# Patient Record
Sex: FEMALE | Race: WHITE | NOT HISPANIC OR LATINO
[De-identification: names, ages, dates, MRNs, and addresses within clinical notes are randomized per-mention and may not be internally consistent; named-entity substitution may affect disease eponyms.]

---

## 2017-07-26 ENCOUNTER — APPOINTMENT (OUTPATIENT)
Dept: CT IMAGING | Facility: HOSPITAL | Age: 62
End: 2017-07-26

## 2017-07-26 ENCOUNTER — OUTPATIENT (OUTPATIENT)
Dept: OUTPATIENT SERVICES | Facility: HOSPITAL | Age: 62
LOS: 1 days | End: 2017-07-26
Payer: COMMERCIAL

## 2017-07-26 PROCEDURE — 74177 CT ABD & PELVIS W/CONTRAST: CPT

## 2017-08-03 ENCOUNTER — APPOINTMENT (OUTPATIENT)
Dept: MRI IMAGING | Facility: HOSPITAL | Age: 62
End: 2017-08-03

## 2017-08-03 ENCOUNTER — OUTPATIENT (OUTPATIENT)
Dept: OUTPATIENT SERVICES | Facility: HOSPITAL | Age: 62
LOS: 1 days | End: 2017-08-03
Payer: COMMERCIAL

## 2017-08-03 ENCOUNTER — APPOINTMENT (OUTPATIENT)
Dept: MRI IMAGING | Facility: HOSPITAL | Age: 62
End: 2017-08-03
Payer: COMMERCIAL

## 2017-08-03 PROCEDURE — 76376 3D RENDER W/INTRP POSTPROCES: CPT

## 2017-08-03 PROCEDURE — 74183 MRI ABD W/O CNTR FLWD CNTR: CPT

## 2017-08-03 PROCEDURE — A9579: CPT

## 2017-08-03 PROCEDURE — 74183 MRI ABD W/O CNTR FLWD CNTR: CPT | Mod: 26

## 2017-08-03 PROCEDURE — 76376 3D RENDER W/INTRP POSTPROCES: CPT | Mod: 26

## 2017-08-16 ENCOUNTER — LABORATORY RESULT (OUTPATIENT)
Age: 62
End: 2017-08-16

## 2017-08-16 ENCOUNTER — APPOINTMENT (OUTPATIENT)
Age: 62
End: 2017-08-16
Payer: COMMERCIAL

## 2017-08-16 VITALS
DIASTOLIC BLOOD PRESSURE: 75 MMHG | WEIGHT: 168 LBS | SYSTOLIC BLOOD PRESSURE: 112 MMHG | OXYGEN SATURATION: 100 % | HEIGHT: 69 IN | HEART RATE: 67 BPM | BODY MASS INDEX: 24.88 KG/M2

## 2017-08-16 DIAGNOSIS — Z87.891 PERSONAL HISTORY OF NICOTINE DEPENDENCE: ICD-10-CM

## 2017-08-16 DIAGNOSIS — Z78.9 OTHER SPECIFIED HEALTH STATUS: ICD-10-CM

## 2017-08-16 PROCEDURE — 99204 OFFICE O/P NEW MOD 45 MIN: CPT

## 2017-08-17 LAB
ALBUMIN SERPL ELPH-MCNC: 2.7 G/DL
ALP BLD-CCNC: 754 U/L
ALT SERPL-CCNC: 436 U/L
ANION GAP SERPL CALC-SCNC: 14 MMOL/L
AST SERPL-CCNC: 1101 U/L
B BURGDOR IGG+IGM SER QL IB: NORMAL
BASOPHILS # BLD AUTO: 0.05 K/UL
BASOPHILS NFR BLD AUTO: 0.9 %
BILIRUB DIRECT SERPL-MCNC: 6.5 MG/DL
BILIRUB SERPL-MCNC: 8.9 MG/DL
BUN SERPL-MCNC: 14 MG/DL
CALCIUM SERPL-MCNC: 8.5 MG/DL
CHLORIDE SERPL-SCNC: 102 MMOL/L
CO2 SERPL-SCNC: 20 MMOL/L
CREAT SERPL-MCNC: 0.58 MG/DL
EOSINOPHIL # BLD AUTO: 0.09 K/UL
EOSINOPHIL NFR BLD AUTO: 1.7 %
FERRITIN SERPL-MCNC: 57 NG/ML
GGT SERPL-CCNC: 466 U/L
GLUCOSE SERPL-MCNC: 155 MG/DL
HCT VFR BLD CALC: 32.4 %
HGB BLD-MCNC: 10.1 G/DL
INR PPP: 0.98 RATIO
IRON SATN MFR SERPL: 11 %
IRON SERPL-MCNC: 43 UG/DL
LYMPHOCYTES # BLD AUTO: 1.44 K/UL
LYMPHOCYTES NFR BLD AUTO: 27 %
MAN DIFF?: NORMAL
MCHC RBC-ENTMCNC: 25.2 PG
MCHC RBC-ENTMCNC: 31.2 GM/DL
MCV RBC AUTO: 80.8 FL
MONOCYTES # BLD AUTO: 0.42 K/UL
MONOCYTES NFR BLD AUTO: 7.8 %
NEUTROPHILS # BLD AUTO: 3.29 K/UL
NEUTROPHILS NFR BLD AUTO: 60.8 %
PLATELET # BLD AUTO: 313 K/UL
POTASSIUM SERPL-SCNC: 4.1 MMOL/L
PROT SERPL-MCNC: 7.1 G/DL
PT BLD: 11.1 SEC
RBC # BLD: 3.93 M/UL
RBC # BLD: 4.01 M/UL
RBC # FLD: 23.4 %
RETICS # AUTO: 2.1 %
RETICS AGGREG/RBC NFR: 82.1 K/UL
SODIUM SERPL-SCNC: 136 MMOL/L
TIBC SERPL-MCNC: 395 UG/DL
UIBC SERPL-MCNC: 352 UG/DL
WBC # FLD AUTO: 5.34 K/UL

## 2017-08-18 LAB
ANA PAT FLD IF-IMP: ABNORMAL
ANA SER IF-ACNC: ABNORMAL
B DIV+MO-1 18S RRNA BLD QL NAA+NON-PROBE: NEGATIVE
B DUNCANI 18S RRNA BLD QL NAA+NON-PROBE: NEGATIVE
B MICROTI 18S RRNA BLD QL NAA+NON-PROBE: NEGATIVE
DEPRECATED KAPPA LC FREE/LAMBDA SER: 1.47 RATIO
IGA SER QL IEP: 228 MG/DL
IGG SER QL IEP: 1800 MG/DL
IGM SER QL IEP: 256 MG/DL
KAPPA LC CSF-MCNC: 2.63 MG/DL
KAPPA LC SERPL-MCNC: 3.86 MG/DL

## 2017-08-21 ENCOUNTER — LABORATORY RESULT (OUTPATIENT)
Age: 62
End: 2017-08-21

## 2017-08-21 LAB — SMOOTH MUSCLE AB SER QL IF: ABNORMAL

## 2017-08-22 LAB
ALBUMIN SERPL ELPH-MCNC: 2.8 G/DL
ALP BLD-CCNC: 757 U/L
ALT SERPL-CCNC: 431 U/L
ANION GAP SERPL CALC-SCNC: 13 MMOL/L
AST SERPL-CCNC: 1098 U/L
BASOPHILS # BLD AUTO: 0.02 K/UL
BASOPHILS NFR BLD AUTO: 0.4 %
BILIRUB SERPL-MCNC: 6.7 MG/DL
BUN SERPL-MCNC: 20 MG/DL
CALCIUM SERPL-MCNC: 8.9 MG/DL
CHLORIDE SERPL-SCNC: 107 MMOL/L
CO2 SERPL-SCNC: 20 MMOL/L
CREAT SERPL-MCNC: 0.72 MG/DL
EOSINOPHIL # BLD AUTO: 0.17 K/UL
EOSINOPHIL NFR BLD AUTO: 3.5 %
GLUCOSE SERPL-MCNC: 77 MG/DL
HCT VFR BLD CALC: 30.8 %
HGB BLD-MCNC: 9.6 G/DL
IMM GRANULOCYTES NFR BLD AUTO: 0.2 %
INR PPP: 0.97 RATIO
LYMPHOCYTES # BLD AUTO: 1.49 K/UL
LYMPHOCYTES NFR BLD AUTO: 30.8 %
MAN DIFF?: NORMAL
MCHC RBC-ENTMCNC: 25.9 PG
MCHC RBC-ENTMCNC: 31.2 GM/DL
MCV RBC AUTO: 83 FL
MONOCYTES # BLD AUTO: 0.81 K/UL
MONOCYTES NFR BLD AUTO: 16.8 %
NEUTROPHILS # BLD AUTO: 2.33 K/UL
NEUTROPHILS NFR BLD AUTO: 48.3 %
PLATELET # BLD AUTO: 274 K/UL
POTASSIUM SERPL-SCNC: 4.9 MMOL/L
PROT SERPL-MCNC: 6.6 G/DL
PT BLD: 10.9 SEC
RBC # BLD: 3.71 M/UL
RBC # FLD: 20.7 %
SODIUM SERPL-SCNC: 140 MMOL/L
WBC # FLD AUTO: 4.83 K/UL

## 2017-08-23 LAB
DEPRECATED KAPPA LC FREE/LAMBDA SER: 1.77 RATIO
IGA SER QL IEP: 235 MG/DL
IGG SER QL IEP: 1820 MG/DL
IGM SER QL IEP: 240 MG/DL
KAPPA LC CSF-MCNC: 2.36 MG/DL
KAPPA LC SERPL-MCNC: 4.18 MG/DL
MITOCHONDRIA AB SER IF-ACNC: NORMAL

## 2017-08-24 LAB
ANA PAT FLD IF-IMP: ABNORMAL
ANA SER IF-ACNC: ABNORMAL
BABESIA ANTIBODIES, IGG: NORMAL TITER
BABESIA ANTIBODIES, IGM: NORMAL TITER
SMOOTH MUSCLE AB SER QL IF: ABNORMAL

## 2017-08-28 ENCOUNTER — OUTPATIENT (OUTPATIENT)
Dept: OUTPATIENT SERVICES | Facility: HOSPITAL | Age: 62
LOS: 1 days | End: 2017-08-28
Payer: COMMERCIAL

## 2017-08-28 ENCOUNTER — APPOINTMENT (OUTPATIENT)
Dept: ULTRASOUND IMAGING | Facility: IMAGING CENTER | Age: 62
End: 2017-08-28
Payer: COMMERCIAL

## 2017-08-28 ENCOUNTER — RESULT REVIEW (OUTPATIENT)
Age: 62
End: 2017-08-28

## 2017-08-28 DIAGNOSIS — B17.9 ACUTE VIRAL HEPATITIS, UNSPECIFIED: ICD-10-CM

## 2017-08-28 PROCEDURE — 47000 NEEDLE BIOPSY OF LIVER PERQ: CPT

## 2017-08-28 PROCEDURE — 76942 ECHO GUIDE FOR BIOPSY: CPT

## 2017-08-28 PROCEDURE — 88307 TISSUE EXAM BY PATHOLOGIST: CPT | Mod: 26

## 2017-08-28 PROCEDURE — 76942 ECHO GUIDE FOR BIOPSY: CPT | Mod: 26

## 2017-08-28 PROCEDURE — 88313 SPECIAL STAINS GROUP 2: CPT | Mod: 26

## 2017-08-28 PROCEDURE — 88307 TISSUE EXAM BY PATHOLOGIST: CPT

## 2017-08-28 PROCEDURE — 88313 SPECIAL STAINS GROUP 2: CPT

## 2017-08-29 LAB
INR PPP: 0.99 RATIO
PT BLD: 11.2 SEC

## 2017-08-30 LAB
ALBUMIN SERPL ELPH-MCNC: 2.9 G/DL
ALP BLD-CCNC: 544 U/L
ALT SERPL-CCNC: 345 U/L
ANION GAP SERPL CALC-SCNC: 15 MMOL/L
AST SERPL-CCNC: 761 U/L
BASOPHILS # BLD AUTO: 0.04 K/UL
BASOPHILS NFR BLD AUTO: 0.6 %
BILIRUB SERPL-MCNC: 4.4 MG/DL
BUN SERPL-MCNC: 20 MG/DL
CALCIUM SERPL-MCNC: 9.3 MG/DL
CHLORIDE SERPL-SCNC: 106 MMOL/L
CO2 SERPL-SCNC: 21 MMOL/L
CREAT SERPL-MCNC: 0.56 MG/DL
EOSINOPHIL # BLD AUTO: 0.28 K/UL
EOSINOPHIL NFR BLD AUTO: 4.5 %
GLUCOSE SERPL-MCNC: 72 MG/DL
HCT VFR BLD CALC: 33.4 %
HGB BLD-MCNC: 10.1 G/DL
IMM GRANULOCYTES NFR BLD AUTO: 0.2 %
LYMPHOCYTES # BLD AUTO: 1.33 K/UL
LYMPHOCYTES NFR BLD AUTO: 21.2 %
MAN DIFF?: NORMAL
MCHC RBC-ENTMCNC: 25.5 PG
MCHC RBC-ENTMCNC: 30.2 GM/DL
MCV RBC AUTO: 84.3 FL
MONOCYTES # BLD AUTO: 0.71 K/UL
MONOCYTES NFR BLD AUTO: 11.3 %
NEUTROPHILS # BLD AUTO: 3.89 K/UL
NEUTROPHILS NFR BLD AUTO: 62.2 %
PLATELET # BLD AUTO: 287 K/UL
POTASSIUM SERPL-SCNC: 4.2 MMOL/L
PROT SERPL-MCNC: 7.1 G/DL
RBC # BLD: 3.96 M/UL
RBC # FLD: 19.2 %
SODIUM SERPL-SCNC: 142 MMOL/L
SURGICAL PATHOLOGY STUDY: SIGNIFICANT CHANGE UP
WBC # FLD AUTO: 6.26 K/UL

## 2017-08-31 ENCOUNTER — APPOINTMENT (OUTPATIENT)
Age: 62
End: 2017-08-31
Payer: COMMERCIAL

## 2017-08-31 VITALS
BODY MASS INDEX: 25.62 KG/M2 | HEART RATE: 77 BPM | TEMPERATURE: 97.8 F | RESPIRATION RATE: 14 BRPM | HEIGHT: 69 IN | WEIGHT: 173 LBS | DIASTOLIC BLOOD PRESSURE: 73 MMHG | SYSTOLIC BLOOD PRESSURE: 111 MMHG

## 2017-08-31 LAB
A PHAGOCYTOPH IGG TITR SER IF: NORMAL
ANAPLASMA PHAGOCYTOPHILIA IGG ANTIBODIES: NORMAL
ANAPLASMA PHAGOCYTOPHILIA IGM ANTIBODIES: NORMAL
EHRLICHIA AB SER QL IA: NORMAL
EHRLICIA CHAFFEENIS IGG ANTIBODIES: NORMAL
EHRLICIA CHAFFEENIS IGM ANTIBODIES: NORMAL

## 2017-08-31 PROCEDURE — 99214 OFFICE O/P EST MOD 30 MIN: CPT

## 2017-09-01 LAB
25(OH)D3 SERPL-MCNC: 21.5 NG/ML
ALBUMIN SERPL ELPH-MCNC: 3.2 G/DL
ALP BLD-CCNC: 533 U/L
ALT SERPL-CCNC: 362 U/L
ANION GAP SERPL CALC-SCNC: 16 MMOL/L
AST SERPL-CCNC: 749 U/L
BASOPHILS # BLD AUTO: 0.03 K/UL
BASOPHILS NFR BLD AUTO: 0.6 %
BILIRUB SERPL-MCNC: 4.7 MG/DL
BUN SERPL-MCNC: 18 MG/DL
CALCIUM SERPL-MCNC: 9.9 MG/DL
CHLORIDE SERPL-SCNC: 102 MMOL/L
CHOLEST SERPL-MCNC: 302 MG/DL
CHOLEST/HDLC SERPL: 9.7 RATIO
CO2 SERPL-SCNC: 20 MMOL/L
CREAT SERPL-MCNC: 0.65 MG/DL
DEPRECATED KAPPA LC FREE/LAMBDA SER: 1.66 RATIO
EOSINOPHIL # BLD AUTO: 0.26 K/UL
EOSINOPHIL NFR BLD AUTO: 5.3 %
GLUCOSE SERPL-MCNC: 89 MG/DL
HAV IGG+IGM SER QL: NONREACTIVE
HBA1C MFR BLD HPLC: 4.4 %
HBV CORE IGG+IGM SER QL: NONREACTIVE
HBV SURFACE AB SER QL: NONREACTIVE
HBV SURFACE AG SER QL: NONREACTIVE
HCT VFR BLD CALC: 36.7 %
HDLC SERPL-MCNC: 31 MG/DL
HGB BLD-MCNC: 10.9 G/DL
IGA SER QL IEP: 269 MG/DL
IGG SER QL IEP: 2130 MG/DL
IGM SER QL IEP: 244 MG/DL
IMM GRANULOCYTES NFR BLD AUTO: 0 %
KAPPA LC CSF-MCNC: 2.3 MG/DL
KAPPA LC SERPL-MCNC: 3.81 MG/DL
LDLC SERPL CALC-MCNC: 255 MG/DL
LYMPHOCYTES # BLD AUTO: 1.39 K/UL
LYMPHOCYTES NFR BLD AUTO: 28.1 %
MAN DIFF?: NORMAL
MCHC RBC-ENTMCNC: 25.5 PG
MCHC RBC-ENTMCNC: 29.7 GM/DL
MCV RBC AUTO: 85.9 FL
MONOCYTES # BLD AUTO: 0.66 K/UL
MONOCYTES NFR BLD AUTO: 13.4 %
NEUTROPHILS # BLD AUTO: 2.6 K/UL
NEUTROPHILS NFR BLD AUTO: 52.6 %
PLATELET # BLD AUTO: 308 K/UL
POTASSIUM SERPL-SCNC: 4.2 MMOL/L
PROT SERPL-MCNC: 7.7 G/DL
RBC # BLD: 4.27 M/UL
RBC # FLD: 19 %
SODIUM SERPL-SCNC: 138 MMOL/L
TRIGL SERPL-MCNC: 82 MG/DL
WBC # FLD AUTO: 4.94 K/UL

## 2017-09-02 ENCOUNTER — APPOINTMENT (OUTPATIENT)
Dept: RADIOLOGY | Facility: HOSPITAL | Age: 62
End: 2017-09-02
Payer: COMMERCIAL

## 2017-09-02 ENCOUNTER — OUTPATIENT (OUTPATIENT)
Dept: OUTPATIENT SERVICES | Facility: HOSPITAL | Age: 62
LOS: 1 days | End: 2017-09-02
Payer: COMMERCIAL

## 2017-09-02 PROCEDURE — 77080 DXA BONE DENSITY AXIAL: CPT

## 2017-09-02 PROCEDURE — 77080 DXA BONE DENSITY AXIAL: CPT | Mod: 26

## 2017-09-07 LAB
ALBUMIN SERPL ELPH-MCNC: 3 G/DL
ALP BLD-CCNC: 360 U/L
ALT SERPL-CCNC: 128 U/L
AMYLASE/CREAT SERPL: 33 U/L
ANION GAP SERPL CALC-SCNC: 16 MMOL/L
AST SERPL-CCNC: 146 U/L
BILIRUB SERPL-MCNC: 3.4 MG/DL
BUN SERPL-MCNC: 13 MG/DL
CALCIUM SERPL-MCNC: 9.5 MG/DL
CHLORIDE SERPL-SCNC: 104 MMOL/L
CO2 SERPL-SCNC: 21 MMOL/L
CREAT SERPL-MCNC: 0.68 MG/DL
GLUCOSE SERPL-MCNC: 133 MG/DL
LPL SERPL-CCNC: 25 U/L
POTASSIUM SERPL-SCNC: 4.5 MMOL/L
PROT SERPL-MCNC: 6.6 G/DL
SODIUM SERPL-SCNC: 141 MMOL/L

## 2017-09-08 LAB
DEPRECATED KAPPA LC FREE/LAMBDA SER: 1.07 RATIO
IGA SER QL IEP: 225 MG/DL
IGG SER QL IEP: 1820 MG/DL
IGM SER QL IEP: 240 MG/DL
KAPPA LC CSF-MCNC: 1.83 MG/DL
KAPPA LC SERPL-MCNC: 1.96 MG/DL

## 2017-09-14 ENCOUNTER — LABORATORY RESULT (OUTPATIENT)
Age: 62
End: 2017-09-14

## 2017-09-29 ENCOUNTER — LABORATORY RESULT (OUTPATIENT)
Age: 62
End: 2017-09-29

## 2017-10-01 LAB
ALBUMIN SERPL ELPH-MCNC: 3.1 G/DL
ALP BLD-CCNC: 158 U/L
ALT SERPL-CCNC: 29 U/L
ANION GAP SERPL CALC-SCNC: 14 MMOL/L
AST SERPL-CCNC: 49 U/L
BASOPHILS # BLD AUTO: 0 K/UL
BASOPHILS NFR BLD AUTO: 0 %
BILIRUB SERPL-MCNC: 1.3 MG/DL
BUN SERPL-MCNC: 17 MG/DL
CALCIUM SERPL-MCNC: 8.5 MG/DL
CHLORIDE SERPL-SCNC: 104 MMOL/L
CO2 SERPL-SCNC: 24 MMOL/L
CREAT SERPL-MCNC: 0.65 MG/DL
DEPRECATED KAPPA LC FREE/LAMBDA SER: 1.34 RATIO
EOSINOPHIL # BLD AUTO: 0 K/UL
EOSINOPHIL NFR BLD AUTO: 0 %
GLUCOSE SERPL-MCNC: 104 MG/DL
HCT VFR BLD CALC: 29.4 %
HGB BLD-MCNC: 9.3 G/DL
IGA SER QL IEP: 188 MG/DL
IGG SER QL IEP: 1300 MG/DL
IGM SER QL IEP: 190 MG/DL
INR PPP: 0.96 RATIO
KAPPA LC CSF-MCNC: 1.91 MG/DL
KAPPA LC SERPL-MCNC: 2.55 MG/DL
LYMPHOCYTES # BLD AUTO: 0.24 K/UL
LYMPHOCYTES NFR BLD AUTO: 4.5 %
MAN DIFF?: NORMAL
MCHC RBC-ENTMCNC: 26.3 PG
MCHC RBC-ENTMCNC: 31.6 GM/DL
MCV RBC AUTO: 83.1 FL
MONOCYTES # BLD AUTO: 0.24 K/UL
MONOCYTES NFR BLD AUTO: 4.5 %
NEUTROPHILS # BLD AUTO: 4.76 K/UL
NEUTROPHILS NFR BLD AUTO: 91 %
PLATELET # BLD AUTO: 242 K/UL
POTASSIUM SERPL-SCNC: 4.5 MMOL/L
PROT SERPL-MCNC: 6.4 G/DL
PT BLD: 10.8 SEC
RBC # BLD: 3.54 M/UL
RBC # FLD: 15.8 %
SODIUM SERPL-SCNC: 142 MMOL/L
WBC # FLD AUTO: 5.23 K/UL

## 2017-10-03 LAB
ANA PAT FLD IF-IMP: ABNORMAL
ANA SER IF-ACNC: ABNORMAL

## 2017-10-18 ENCOUNTER — APPOINTMENT (OUTPATIENT)
Age: 62
End: 2017-10-18
Payer: COMMERCIAL

## 2017-10-18 VITALS
SYSTOLIC BLOOD PRESSURE: 110 MMHG | HEIGHT: 69 IN | DIASTOLIC BLOOD PRESSURE: 70 MMHG | HEART RATE: 86 BPM | OXYGEN SATURATION: 99 %

## 2017-10-18 DIAGNOSIS — B17.9 ACUTE VIRAL HEPATITIS, UNSPECIFIED: ICD-10-CM

## 2017-10-18 PROCEDURE — 99214 OFFICE O/P EST MOD 30 MIN: CPT

## 2017-10-18 RX ORDER — PREDNISONE 10 MG/1
10 TABLET ORAL DAILY
Qty: 28 | Refills: 6 | Status: DISCONTINUED | COMMUNITY
Start: 2017-08-31 | End: 2017-10-18

## 2017-12-14 ENCOUNTER — LABORATORY RESULT (OUTPATIENT)
Age: 62
End: 2017-12-14

## 2018-01-10 ENCOUNTER — APPOINTMENT (OUTPATIENT)
Age: 63
End: 2018-01-10
Payer: COMMERCIAL

## 2018-01-10 VITALS
HEART RATE: 66 BPM | TEMPERATURE: 98 F | SYSTOLIC BLOOD PRESSURE: 90 MMHG | HEIGHT: 69 IN | DIASTOLIC BLOOD PRESSURE: 70 MMHG | BODY MASS INDEX: 25.92 KG/M2 | WEIGHT: 175 LBS | OXYGEN SATURATION: 99 %

## 2018-01-10 PROCEDURE — 99214 OFFICE O/P EST MOD 30 MIN: CPT

## 2018-03-08 ENCOUNTER — RX RENEWAL (OUTPATIENT)
Age: 63
End: 2018-03-08

## 2018-03-30 LAB
ALBUMIN SERPL ELPH-MCNC: 3.9 G/DL
ALP BLD-CCNC: 65 U/L
ALT SERPL-CCNC: 16 U/L
ANION GAP SERPL CALC-SCNC: 15 MMOL/L
AST SERPL-CCNC: 28 U/L
BASOPHILS # BLD AUTO: 0.01 K/UL
BASOPHILS NFR BLD AUTO: 0.2 %
BILIRUB SERPL-MCNC: 0.4 MG/DL
BUN SERPL-MCNC: 16 MG/DL
CALCIUM SERPL-MCNC: 9.9 MG/DL
CHLORIDE SERPL-SCNC: 103 MMOL/L
CO2 SERPL-SCNC: 23 MMOL/L
CREAT SERPL-MCNC: 0.7 MG/DL
EOSINOPHIL # BLD AUTO: 0.02 K/UL
EOSINOPHIL NFR BLD AUTO: 0.4 %
GGT SERPL-CCNC: 17 U/L
GLUCOSE SERPL-MCNC: 95 MG/DL
HCT VFR BLD CALC: 37.5 %
HGB BLD-MCNC: 12.1 G/DL
IMM GRANULOCYTES NFR BLD AUTO: 0 %
INR PPP: 0.91 RATIO
LYMPHOCYTES # BLD AUTO: 0.68 K/UL
LYMPHOCYTES NFR BLD AUTO: 14.6 %
MAN DIFF?: NORMAL
MCHC RBC-ENTMCNC: 27.4 PG
MCHC RBC-ENTMCNC: 32.3 GM/DL
MCV RBC AUTO: 84.8 FL
MONOCYTES # BLD AUTO: 0.3 K/UL
MONOCYTES NFR BLD AUTO: 6.4 %
NEUTROPHILS # BLD AUTO: 3.66 K/UL
NEUTROPHILS NFR BLD AUTO: 78.4 %
PLATELET # BLD AUTO: 200 K/UL
POTASSIUM SERPL-SCNC: 4.5 MMOL/L
PROT SERPL-MCNC: 6.8 G/DL
PT BLD: 10.3 SEC
RBC # BLD: 4.42 M/UL
RBC # FLD: 17 %
SODIUM SERPL-SCNC: 141 MMOL/L
WBC # FLD AUTO: 4.67 K/UL

## 2018-04-02 LAB
ANA PAT FLD IF-IMP: ABNORMAL
ANA SER IF-ACNC: ABNORMAL
DEPRECATED KAPPA LC FREE/LAMBDA SER: 1.06 RATIO
IGA SER QL IEP: 118 MG/DL
IGG SER QL IEP: 863 MG/DL
IGM SER QL IEP: 146 MG/DL
KAPPA LC CSF-MCNC: 1.13 MG/DL
KAPPA LC SERPL-MCNC: 1.2 MG/DL
MITOCHONDRIA AB SER IF-ACNC: NORMAL
SMOOTH MUSCLE AB SER QL IF: ABNORMAL

## 2018-04-27 ENCOUNTER — APPOINTMENT (OUTPATIENT)
Age: 63
End: 2018-04-27
Payer: COMMERCIAL

## 2018-04-27 VITALS
HEART RATE: 71 BPM | DIASTOLIC BLOOD PRESSURE: 73 MMHG | WEIGHT: 172 LBS | TEMPERATURE: 97.9 F | HEIGHT: 69 IN | BODY MASS INDEX: 25.48 KG/M2 | RESPIRATION RATE: 14 BRPM | SYSTOLIC BLOOD PRESSURE: 110 MMHG | OXYGEN SATURATION: 96 %

## 2018-04-27 PROCEDURE — 99214 OFFICE O/P EST MOD 30 MIN: CPT

## 2018-04-27 RX ORDER — AZATHIOPRINE 50 1/1
50 TABLET ORAL DAILY
Qty: 28 | Refills: 0 | Status: DISCONTINUED | COMMUNITY
Start: 2018-01-10 | End: 2018-04-27

## 2018-04-27 RX ORDER — PREDNISONE 10 MG/1
10 TABLET ORAL DAILY
Qty: 28 | Refills: 0 | Status: DISCONTINUED | COMMUNITY
Start: 2018-01-10 | End: 2018-04-27

## 2018-04-27 RX ORDER — DENOSUMAB 60 MG/ML
60 INJECTION SUBCUTANEOUS
Qty: 1 | Refills: 0 | Status: DISCONTINUED | COMMUNITY
Start: 2017-10-11 | End: 2018-04-27

## 2018-04-27 RX ORDER — URSODIOL 500 MG/1
500 TABLET ORAL DAILY
Qty: 90 | Refills: 3 | Status: DISCONTINUED | COMMUNITY
Start: 2018-01-10 | End: 2018-04-27

## 2018-04-27 RX ORDER — URSODIOL 500 MG/1
500 TABLET ORAL TWICE DAILY
Qty: 60 | Refills: 5 | Status: DISCONTINUED | COMMUNITY
Start: 2017-08-21 | End: 2018-04-27

## 2018-05-01 LAB
25(OH)D3 SERPL-MCNC: 24.1 NG/ML
IRON SATN MFR SERPL: 19 %
IRON SERPL-MCNC: 73 UG/DL
THYROGLOB AB SERPL-ACNC: <20 IU/ML
THYROPEROXIDASE AB SERPL IA-ACNC: <10 IU/ML
TIBC SERPL-MCNC: 376 UG/DL
TSH SERPL-ACNC: 2.11 UIU/ML
UIBC SERPL-MCNC: 303 UG/DL

## 2018-08-08 ENCOUNTER — APPOINTMENT (OUTPATIENT)
Dept: HEPATOLOGY | Facility: CLINIC | Age: 63
End: 2018-08-08
Payer: COMMERCIAL

## 2018-08-08 VITALS
DIASTOLIC BLOOD PRESSURE: 57 MMHG | HEART RATE: 65 BPM | RESPIRATION RATE: 14 BRPM | SYSTOLIC BLOOD PRESSURE: 111 MMHG | WEIGHT: 167 LBS | BODY MASS INDEX: 24.73 KG/M2 | HEIGHT: 69 IN | OXYGEN SATURATION: 97 %

## 2018-08-08 PROCEDURE — 99214 OFFICE O/P EST MOD 30 MIN: CPT

## 2018-08-09 LAB
25(OH)D3 SERPL-MCNC: 27.1 NG/ML
ALBUMIN SERPL ELPH-MCNC: 3.9 G/DL
ALP BLD-CCNC: 78 U/L
ALT SERPL-CCNC: 14 U/L
ANION GAP SERPL CALC-SCNC: 13 MMOL/L
AST SERPL-CCNC: 26 U/L
BASOPHILS # BLD AUTO: 0.01 K/UL
BASOPHILS NFR BLD AUTO: 0.2 %
BILIRUB SERPL-MCNC: 0.3 MG/DL
BUN SERPL-MCNC: 17 MG/DL
CALCIUM SERPL-MCNC: 9.2 MG/DL
CHLORIDE SERPL-SCNC: 105 MMOL/L
CO2 SERPL-SCNC: 26 MMOL/L
CREAT SERPL-MCNC: 0.71 MG/DL
EOSINOPHIL # BLD AUTO: 0.18 K/UL
EOSINOPHIL NFR BLD AUTO: 3.5 %
GLUCOSE SERPL-MCNC: 96 MG/DL
HCT VFR BLD CALC: 41 %
HGB BLD-MCNC: 13.5 G/DL
IMM GRANULOCYTES NFR BLD AUTO: 0.2 %
INR PPP: 0.91 RATIO
LYMPHOCYTES # BLD AUTO: 1.46 K/UL
LYMPHOCYTES NFR BLD AUTO: 28.5 %
MAN DIFF?: NORMAL
MCHC RBC-ENTMCNC: 29.4 PG
MCHC RBC-ENTMCNC: 32.9 GM/DL
MCV RBC AUTO: 89.3 FL
MONOCYTES # BLD AUTO: 0.49 K/UL
MONOCYTES NFR BLD AUTO: 9.6 %
NEUTROPHILS # BLD AUTO: 2.97 K/UL
NEUTROPHILS NFR BLD AUTO: 58 %
PLATELET # BLD AUTO: 168 K/UL
POTASSIUM SERPL-SCNC: 4.1 MMOL/L
PROT SERPL-MCNC: 6.5 G/DL
PT BLD: 10.3 SEC
RBC # BLD: 4.59 M/UL
RBC # FLD: 13.6 %
SODIUM SERPL-SCNC: 144 MMOL/L
WBC # FLD AUTO: 5.12 K/UL

## 2018-09-17 ENCOUNTER — LABORATORY RESULT (OUTPATIENT)
Age: 63
End: 2018-09-17

## 2018-09-17 ENCOUNTER — OUTPATIENT (OUTPATIENT)
Dept: OUTPATIENT SERVICES | Facility: HOSPITAL | Age: 63
LOS: 1 days | End: 2018-09-17
Payer: COMMERCIAL

## 2018-09-17 ENCOUNTER — APPOINTMENT (OUTPATIENT)
Dept: ULTRASOUND IMAGING | Facility: HOSPITAL | Age: 63
End: 2018-09-17
Payer: COMMERCIAL

## 2018-09-17 DIAGNOSIS — K75.4 AUTOIMMUNE HEPATITIS: ICD-10-CM

## 2018-09-17 PROCEDURE — 76700 US EXAM ABDOM COMPLETE: CPT | Mod: 26

## 2018-09-17 PROCEDURE — 76700 US EXAM ABDOM COMPLETE: CPT

## 2018-09-20 ENCOUNTER — APPOINTMENT (OUTPATIENT)
Dept: HEPATOLOGY | Facility: CLINIC | Age: 63
End: 2018-09-20
Payer: COMMERCIAL

## 2018-09-20 VITALS
RESPIRATION RATE: 14 BRPM | SYSTOLIC BLOOD PRESSURE: 120 MMHG | HEART RATE: 68 BPM | DIASTOLIC BLOOD PRESSURE: 80 MMHG | TEMPERATURE: 97.8 F | HEIGHT: 69 IN

## 2018-09-20 PROCEDURE — ZZZZZ: CPT

## 2018-09-20 PROCEDURE — 91200 LIVER ELASTOGRAPHY: CPT

## 2018-09-20 PROCEDURE — 99214 OFFICE O/P EST MOD 30 MIN: CPT | Mod: 25

## 2018-09-20 RX ORDER — PREDNISONE 10 MG/1
10 TABLET ORAL DAILY
Qty: 28 | Refills: 11 | Status: DISCONTINUED | COMMUNITY
Start: 2017-10-18 | End: 2018-09-20

## 2018-10-12 ENCOUNTER — APPOINTMENT (OUTPATIENT)
Dept: HEPATOLOGY | Facility: CLINIC | Age: 63
End: 2018-10-12

## 2018-10-22 LAB
ALBUMIN SERPL ELPH-MCNC: 3.9 G/DL
ALP BLD-CCNC: 105 U/L
ALT SERPL-CCNC: 14 U/L
ANION GAP SERPL CALC-SCNC: 10 MMOL/L
AST SERPL-CCNC: 25 U/L
BILIRUB SERPL-MCNC: 0.3 MG/DL
BUN SERPL-MCNC: 15 MG/DL
CALCIUM SERPL-MCNC: 9.2 MG/DL
CHLORIDE SERPL-SCNC: 106 MMOL/L
CO2 SERPL-SCNC: 25 MMOL/L
CREAT SERPL-MCNC: 0.68 MG/DL
DEPRECATED KAPPA LC FREE/LAMBDA SER: 0.86 RATIO
GLUCOSE SERPL-MCNC: 69 MG/DL
IGA SER QL IEP: 109 MG/DL
IGG SER QL IEP: 716 MG/DL
IGM SER QL IEP: 106 MG/DL
KAPPA LC CSF-MCNC: 1.37 MG/DL
KAPPA LC SERPL-MCNC: 1.18 MG/DL
POTASSIUM SERPL-SCNC: 4.4 MMOL/L
PROT SERPL-MCNC: 5.9 G/DL
SODIUM SERPL-SCNC: 141 MMOL/L

## 2018-10-23 ENCOUNTER — OTHER (OUTPATIENT)
Age: 63
End: 2018-10-23

## 2018-11-20 ENCOUNTER — APPOINTMENT (OUTPATIENT)
Dept: HEPATOLOGY | Facility: CLINIC | Age: 63
End: 2018-11-20
Payer: COMMERCIAL

## 2018-11-20 VITALS
DIASTOLIC BLOOD PRESSURE: 66 MMHG | HEART RATE: 65 BPM | TEMPERATURE: 97.8 F | SYSTOLIC BLOOD PRESSURE: 103 MMHG | RESPIRATION RATE: 17 BRPM | BODY MASS INDEX: 28.14 KG/M2 | WEIGHT: 190 LBS | HEIGHT: 69 IN

## 2018-11-20 LAB
ALBUMIN SERPL ELPH-MCNC: 4 G/DL
ALP BLD-CCNC: 117 U/L
ALT SERPL-CCNC: 13 U/L
ANION GAP SERPL CALC-SCNC: 11 MMOL/L
AST SERPL-CCNC: 30 U/L
BASOPHILS # BLD AUTO: 0.01 K/UL
BASOPHILS NFR BLD AUTO: 0.3 %
BILIRUB SERPL-MCNC: 0.4 MG/DL
BUN SERPL-MCNC: 10 MG/DL
CALCIUM SERPL-MCNC: 9.4 MG/DL
CHLORIDE SERPL-SCNC: 106 MMOL/L
CO2 SERPL-SCNC: 25 MMOL/L
CREAT SERPL-MCNC: 0.73 MG/DL
EOSINOPHIL # BLD AUTO: 0.1 K/UL
EOSINOPHIL NFR BLD AUTO: 2.8 %
GLUCOSE SERPL-MCNC: 41 MG/DL
HCT VFR BLD CALC: 40.8 %
HGB BLD-MCNC: 13.4 G/DL
IMM GRANULOCYTES NFR BLD AUTO: 0 %
INR PPP: 0.97 RATIO
IRON SATN MFR SERPL: 30 %
IRON SERPL-MCNC: 105 UG/DL
LYMPHOCYTES # BLD AUTO: 1.07 K/UL
LYMPHOCYTES NFR BLD AUTO: 29.6 %
MAN DIFF?: NORMAL
MCHC RBC-ENTMCNC: 29.2 PG
MCHC RBC-ENTMCNC: 32.8 GM/DL
MCV RBC AUTO: 88.9 FL
MONOCYTES # BLD AUTO: 0.49 K/UL
MONOCYTES NFR BLD AUTO: 13.5 %
NEUTROPHILS # BLD AUTO: 1.95 K/UL
NEUTROPHILS NFR BLD AUTO: 53.8 %
PLATELET # BLD AUTO: 188 K/UL
POTASSIUM SERPL-SCNC: 4.1 MMOL/L
PROT SERPL-MCNC: 6.7 G/DL
PT BLD: 10.8 SEC
RBC # BLD: 4.59 M/UL
RBC # FLD: 13.1 %
SODIUM SERPL-SCNC: 142 MMOL/L
TIBC SERPL-MCNC: 347 UG/DL
UIBC SERPL-MCNC: 242 UG/DL
WBC # FLD AUTO: 3.62 K/UL

## 2018-11-20 PROCEDURE — 99214 OFFICE O/P EST MOD 30 MIN: CPT

## 2018-11-20 RX ORDER — DENOSUMAB 60 MG/ML
60 INJECTION SUBCUTANEOUS
Refills: 0 | Status: DISCONTINUED | COMMUNITY
Start: 2018-04-27 | End: 2018-11-20

## 2018-11-20 RX ORDER — PREDNISONE 5 MG/1
5 TABLET ORAL
Refills: 0 | Status: DISCONTINUED | COMMUNITY
End: 2018-11-20

## 2018-11-20 RX ORDER — SODIUM SULFATE, POTASSIUM SULFATE, MAGNESIUM SULFATE 17.5; 3.13; 1.6 G/ML; G/ML; G/ML
17.5-3.13-1.6 SOLUTION, CONCENTRATE ORAL
Qty: 354 | Refills: 0 | Status: DISCONTINUED | COMMUNITY
Start: 2018-04-30 | End: 2018-11-20

## 2018-11-20 RX ORDER — CLINDAMYCIN PHOSPHATE 10 MG/ML
1 SOLUTION TOPICAL
Qty: 60 | Refills: 0 | Status: DISCONTINUED | COMMUNITY
Start: 2017-09-14 | End: 2018-11-20

## 2018-11-20 RX ORDER — IRON/IRON ASP GLY/FA/MV-MIN 38 125-25-1MG
TABLET ORAL
Refills: 0 | Status: DISCONTINUED | COMMUNITY
End: 2018-11-20

## 2018-12-20 LAB
BASOPHILS # BLD AUTO: 0.01 K/UL
BASOPHILS NFR BLD AUTO: 0.2 %
EOSINOPHIL # BLD AUTO: 0.09 K/UL
EOSINOPHIL NFR BLD AUTO: 1.9 %
HCT VFR BLD CALC: 40 %
HGB BLD-MCNC: 13 G/DL
IMM GRANULOCYTES NFR BLD AUTO: 0.2 %
LYMPHOCYTES # BLD AUTO: 1.12 K/UL
LYMPHOCYTES NFR BLD AUTO: 23.8 %
MAN DIFF?: NORMAL
MCHC RBC-ENTMCNC: 28.3 PG
MCHC RBC-ENTMCNC: 32.5 GM/DL
MCV RBC AUTO: 87.1 FL
MONOCYTES # BLD AUTO: 0.52 K/UL
MONOCYTES NFR BLD AUTO: 11 %
NEUTROPHILS # BLD AUTO: 2.96 K/UL
NEUTROPHILS NFR BLD AUTO: 62.9 %
PLATELET # BLD AUTO: 152 K/UL
RBC # BLD: 4.59 M/UL
RBC # FLD: 13.3 %
WBC # FLD AUTO: 4.71 K/UL

## 2018-12-21 LAB
ALBUMIN SERPL ELPH-MCNC: 4.2 G/DL
ALP BLD-CCNC: 131 U/L
ALT SERPL-CCNC: 16 U/L
ANION GAP SERPL CALC-SCNC: 12 MMOL/L
AST SERPL-CCNC: 22 U/L
BILIRUB SERPL-MCNC: 0.3 MG/DL
BUN SERPL-MCNC: 16 MG/DL
CALCIUM SERPL-MCNC: 9.6 MG/DL
CHLORIDE SERPL-SCNC: 105 MMOL/L
CO2 SERPL-SCNC: 26 MMOL/L
CREAT SERPL-MCNC: 0.59 MG/DL
GLUCOSE SERPL-MCNC: 91 MG/DL
POTASSIUM SERPL-SCNC: 4.5 MMOL/L
PROT SERPL-MCNC: 6.3 G/DL
SODIUM SERPL-SCNC: 142 MMOL/L

## 2019-01-22 LAB
ALBUMIN SERPL ELPH-MCNC: 3.6 G/DL
ALP BLD-CCNC: 111 U/L
ALT SERPL-CCNC: 16 U/L
AST SERPL-CCNC: 12 U/L
BASOPHILS # BLD AUTO: 0.01 K/UL
BASOPHILS NFR BLD AUTO: 0.2 %
BILIRUB DIRECT SERPL-MCNC: 0.1 MG/DL
BILIRUB INDIRECT SERPL-MCNC: 0.2 MG/DL
BILIRUB SERPL-MCNC: 0.3 MG/DL
EOSINOPHIL # BLD AUTO: 0.12 K/UL
EOSINOPHIL NFR BLD AUTO: 2.9 %
HCT VFR BLD CALC: 40 %
HGB BLD-MCNC: 12.6 G/DL
IGG SER QL IEP: 808 MG/DL
IMM GRANULOCYTES NFR BLD AUTO: 0.2 %
LYMPHOCYTES # BLD AUTO: 1.17 K/UL
LYMPHOCYTES NFR BLD AUTO: 28.7 %
MAN DIFF?: NORMAL
MCHC RBC-ENTMCNC: 27.9 PG
MCHC RBC-ENTMCNC: 31.5 GM/DL
MCV RBC AUTO: 88.5 FL
MONOCYTES # BLD AUTO: 0.44 K/UL
MONOCYTES NFR BLD AUTO: 10.8 %
NEUTROPHILS # BLD AUTO: 2.33 K/UL
NEUTROPHILS NFR BLD AUTO: 57.2 %
PLATELET # BLD AUTO: 160 K/UL
PROT SERPL-MCNC: 6.5 G/DL
RBC # BLD: 4.52 M/UL
RBC # FLD: 13.1 %
WBC # FLD AUTO: 4.08 K/UL

## 2019-02-27 RX ORDER — AZATHIOPRINE 50 1/1
50 TABLET ORAL DAILY
Qty: 28 | Refills: 6 | Status: DISCONTINUED | COMMUNITY
Start: 2017-08-31 | End: 2019-02-27

## 2019-04-09 ENCOUNTER — APPOINTMENT (OUTPATIENT)
Dept: HEPATOLOGY | Facility: CLINIC | Age: 64
End: 2019-04-09
Payer: COMMERCIAL

## 2019-04-09 VITALS
SYSTOLIC BLOOD PRESSURE: 118 MMHG | TEMPERATURE: 97.9 F | HEART RATE: 61 BPM | RESPIRATION RATE: 16 BRPM | DIASTOLIC BLOOD PRESSURE: 78 MMHG

## 2019-04-09 LAB
ALBUMIN SERPL ELPH-MCNC: 4.1 G/DL
ALP BLD-CCNC: 131 U/L
ALT SERPL-CCNC: 19 U/L
ANION GAP SERPL CALC-SCNC: 11 MMOL/L
AST SERPL-CCNC: 27 U/L
BASOPHILS # BLD AUTO: 0.02 K/UL
BASOPHILS NFR BLD AUTO: 0.6 %
BILIRUB SERPL-MCNC: 0.3 MG/DL
BUN SERPL-MCNC: 13 MG/DL
CALCIUM SERPL-MCNC: 9.5 MG/DL
CHLORIDE SERPL-SCNC: 105 MMOL/L
CHOLEST SERPL-MCNC: 189 MG/DL
CHOLEST/HDLC SERPL: 2.1 RATIO
CO2 SERPL-SCNC: 25 MMOL/L
CREAT SERPL-MCNC: 0.56 MG/DL
DEPRECATED KAPPA LC FREE/LAMBDA SER: 0.98 RATIO
EOSINOPHIL # BLD AUTO: 0.08 K/UL
EOSINOPHIL NFR BLD AUTO: 2.4 %
ESTIMATED AVERAGE GLUCOSE: 108 MG/DL
GLUCOSE SERPL-MCNC: 96 MG/DL
HBA1C MFR BLD HPLC: 5.4 %
HCT VFR BLD CALC: 39.4 %
HDLC SERPL-MCNC: 92 MG/DL
HGB BLD-MCNC: 12.6 G/DL
IGA SER QL IEP: 101 MG/DL
IGG SER QL IEP: 707 MG/DL
IGM SER QL IEP: 100 MG/DL
IMM GRANULOCYTES NFR BLD AUTO: 0.3 %
KAPPA LC CSF-MCNC: 1.29 MG/DL
KAPPA LC SERPL-MCNC: 1.27 MG/DL
LDLC SERPL CALC-MCNC: 77 MG/DL
LYMPHOCYTES # BLD AUTO: 1.17 K/UL
LYMPHOCYTES NFR BLD AUTO: 34.9 %
MAN DIFF?: NORMAL
MCHC RBC-ENTMCNC: 27.9 PG
MCHC RBC-ENTMCNC: 32 GM/DL
MCV RBC AUTO: 87.4 FL
MONOCYTES # BLD AUTO: 0.42 K/UL
MONOCYTES NFR BLD AUTO: 12.5 %
NEUTROPHILS # BLD AUTO: 1.65 K/UL
NEUTROPHILS NFR BLD AUTO: 49.3 %
PLATELET # BLD AUTO: 161 K/UL
POTASSIUM SERPL-SCNC: 4.3 MMOL/L
PROT SERPL-MCNC: 6.1 G/DL
RBC # BLD: 4.51 M/UL
RBC # FLD: 13.1 %
SMOOTH MUSCLE AB SER QL IF: NORMAL
SODIUM SERPL-SCNC: 141 MMOL/L
TRIGL SERPL-MCNC: 102 MG/DL
WBC # FLD AUTO: 3.35 K/UL

## 2019-04-09 PROCEDURE — 99214 OFFICE O/P EST MOD 30 MIN: CPT

## 2019-04-09 NOTE — CONSULT LETTER
[Dear  ___] : Dear  [unfilled], [Courtesy Letter:] : I had the pleasure of seeing your patient, [unfilled], in my office today. [Please see my note below.] : Please see my note below. [Sincerely,] : Sincerely, [FreeTextEntry3] : Bill Lion Jr., M.D.\par [facility]\par [address]\[place] [address]\par [phone]\par

## 2019-04-09 NOTE — PHYSICAL EXAM
[Scleral Icterus] : No Scleral Icterus [Hepatojugular Reflux] : patient did not have a sustained hepatojugular reflux [Abdominal  Ascites] : no ascites [Spider Angioma] : No spider angioma(s) were observed [Ascites Fluid Wave] : no ascites fluid wave [Abdominal Bruit] : no abdominal bruit [Ascites Tense] : ascites is not tense [Ascites Shifting Dullness] : no shifting dullness of ascites [Liver Size (___ Cm)] : Liver size [unfilled] cm [Splenomegaly] : no splenomegaly [Caput Medusae] : no caput medusae observed [Non-Tender] : non-tender [Liver Palpable ___ Finger Breadths Below Costal Margin] : Liver edge was palpable [unfilled] finger breadths below costal margin [Asterixis] : no asterixis observed [Smooth] : smooth [Jaundice] : No jaundice [Palmar Erythema] : no Palmar Erythema [General Appearance - Alert] : alert [General Appearance - Well Nourished] : well nourished [General Appearance - In No Acute Distress] : in no acute distress [General Appearance - Well Developed] : well developed [Sclera] : the sclera and conjunctiva were normal [Outer Ear] : the ears and nose were normal in appearance [General Appearance - Well-Appearing] : healthy appearing [Examination Of The Oral Cavity] : the lips and gums were normal [Neck Appearance] : the appearance of the neck was normal [Jugular Venous Distention Increased] : there was no jugular-venous distention [Neck Cervical Mass (___cm)] : no neck mass was observed [Thyroid Diffuse Enlargement] : the thyroid was not enlarged [Exaggerated Use Of Accessory Muscles For Inspiration] : no accessory muscle use [Respiration, Rhythm And Depth] : normal respiratory rhythm and effort [Auscultation Breath Sounds / Voice Sounds] : lungs were clear to auscultation bilaterally [Heart Rate And Rhythm] : heart rate was normal and rhythm regular [Heart Sounds] : normal S1 and S2 [Bowel Sounds] : normal bowel sounds [Edema] : there was no peripheral edema [Murmurs] : no murmurs [Abdomen Soft] : soft [Abdomen Tenderness] : non-tender [Supraclavicular Lymph Nodes Enlarged Bilaterally] : supraclavicular [Abdomen Mass (___ Cm)] : no abdominal mass palpated [No Spinal Tenderness] : no spinal tenderness [No CVA Tenderness] : no ~M costovertebral angle tenderness [Nail Clubbing] : no clubbing  or cyanosis of the fingernails [Skin Color & Pigmentation] : normal skin color and pigmentation [Involuntary Movements] : no involuntary movements were seen [Skin Turgor] : normal skin turgor [] : no rash [Skin Lesions] : no skin lesions [FreeTextEntry1] : prior surgery left arm with no evidence of melanoma; tan [No Focal Deficits] : no focal deficits [Oriented To Time, Place, And Person] : oriented to person, place, and time

## 2019-04-09 NOTE — HISTORY OF PRESENT ILLNESS
[Infected Sexual Partner] : no infected sexual partner [Needlestick Exposure] : no needlestick exposure [Tattoo] : no tattoos [IV Drug Use] : no IV drug use [Body Piercing] : no body piercing [Hemodialysis] : no hemodialysis [Transfusion before 1992] : no transfusion before 1992 [Transplant before 1992] : no transplant before 1992 [Incarceration] : no incarceration [Autoimmune Disorder] : autoimmune disorder [Alcohol Abuse] : no alcohol abuse [Household Contact to HBV] : no household contact to HBV [Travel to Endemic Area] : no travel to an endemic area [Occupational Exposure] : no occupational exposure [Fatigue] : denies fatigue [Moderate] : moderate in severity [Cocaine Use] : no cocaine use [Malaise] : denies malaise [Fever] : denies fever [Diffuse Joint Pain (Arthralgias)] : denies arthralgias [Muscle Aches, Generalized (Myalgias)] : denies myalgias [Yellow Skin Or Eyes (Jaundice)] : denies jaundice [Abdominal Pain] : denies abdominal pain [Urine Tests Nonspecific Abnormal Findings Biliuria] : denies dark urine [Light Colored Bowel Movement (Acholic Stools)] : denies pale stools [Insomnia] : denies insomnia [Itching (Pruritus)] : denies pruritus [Skin: Rash] : denies rash [Shortness Of Breath] : denies shortness of breath [Wt Loss ___ Lbs] : no recent weight loss [Wt Gain ___ Lbs] : no recent weight gain [de-identified] : This patient has history of liver biopsy in September of 2017 showing stage III-4 fibrosis and autoimmune hepatitis.  The patient was successfully treated with prednisone and Imuran with resolution of jaundice.  The patient is now off prednisone as of October 2018 and in November of 2018 discontinued azathioprine.  Oral aminotransferase values have remained normal.  The patient has had minimal elevation of alkaline phosphatase, but immunoglobulin levels have remained normal.  The patient has had evidence of osteopenia and continues to jog with good energy level.  She has had melanoma and goes for routine skin surveillance.  The patient has been abstinent from alcohol and may begin minimal alcohol intake as desired.\par \par The patient has been using multiple vitamins, vitamin D, vitamin C, and fish oil.  Blood lipids are normal.

## 2019-04-09 NOTE — ASSESSMENT
[FreeTextEntry1] : This patient with autoimmune hepatitis is now post treatment in remission.  The patient, however, did have fibrosis.  On initial liver biopsy.  I will follow the patient with the assessment of liver fibrosis in September of 2019 the patient will continue to be followed to assure absence of relapse of her autoimmune hepatitis.  I will also assess the degree and consequences of her hepatic fibrosis over time.

## 2019-04-11 LAB — ANA SER IF-ACNC: NEGATIVE

## 2019-09-23 ENCOUNTER — APPOINTMENT (OUTPATIENT)
Dept: HEPATOLOGY | Facility: CLINIC | Age: 64
End: 2019-09-23
Payer: COMMERCIAL

## 2019-09-23 PROCEDURE — 91200 LIVER ELASTOGRAPHY: CPT

## 2019-10-02 LAB
ALBUMIN SERPL ELPH-MCNC: 4.3 G/DL
ALP BLD-CCNC: 114 U/L
ALT SERPL-CCNC: 13 U/L
ANION GAP SERPL CALC-SCNC: 11 MMOL/L
AST SERPL-CCNC: 23 U/L
BASOPHILS # BLD AUTO: 0.02 K/UL
BASOPHILS NFR BLD AUTO: 0.4 %
BILIRUB SERPL-MCNC: 0.2 MG/DL
BUN SERPL-MCNC: 17 MG/DL
CALCIUM SERPL-MCNC: 9.3 MG/DL
CHLORIDE SERPL-SCNC: 103 MMOL/L
CO2 SERPL-SCNC: 25 MMOL/L
CREAT SERPL-MCNC: 0.6 MG/DL
DEPRECATED KAPPA LC FREE/LAMBDA SER: 0.9 RATIO
EOSINOPHIL # BLD AUTO: 0.2 K/UL
EOSINOPHIL NFR BLD AUTO: 4.1 %
GGT SERPL-CCNC: 15 U/L
GLUCOSE SERPL-MCNC: 114 MG/DL
HCT VFR BLD CALC: 40.5 %
HGB BLD-MCNC: 12.9 G/DL
IGA SER QL IEP: 118 MG/DL
IGG SER QL IEP: 752 MG/DL
IGM SER QL IEP: 127 MG/DL
IMM GRANULOCYTES NFR BLD AUTO: 0.2 %
INR PPP: 0.91 RATIO
KAPPA LC CSF-MCNC: 1.36 MG/DL
KAPPA LC SERPL-MCNC: 1.22 MG/DL
LYMPHOCYTES # BLD AUTO: 0.96 K/UL
LYMPHOCYTES NFR BLD AUTO: 19.6 %
MAN DIFF?: NORMAL
MCHC RBC-ENTMCNC: 28 PG
MCHC RBC-ENTMCNC: 31.9 GM/DL
MCV RBC AUTO: 88 FL
MONOCYTES # BLD AUTO: 0.49 K/UL
MONOCYTES NFR BLD AUTO: 10 %
NEUTROPHILS # BLD AUTO: 3.22 K/UL
NEUTROPHILS NFR BLD AUTO: 65.7 %
PLATELET # BLD AUTO: 161 K/UL
POTASSIUM SERPL-SCNC: 4.8 MMOL/L
PROT SERPL-MCNC: 6.3 G/DL
PT BLD: 10.4 SEC
RBC # BLD: 4.6 M/UL
RBC # FLD: 13 %
SODIUM SERPL-SCNC: 139 MMOL/L
WBC # FLD AUTO: 4.9 K/UL

## 2019-10-04 ENCOUNTER — LABORATORY RESULT (OUTPATIENT)
Age: 64
End: 2019-10-04

## 2019-10-04 ENCOUNTER — APPOINTMENT (OUTPATIENT)
Dept: HEPATOLOGY | Facility: CLINIC | Age: 64
End: 2019-10-04
Payer: COMMERCIAL

## 2019-10-04 VITALS
OXYGEN SATURATION: 99 % | HEIGHT: 69 IN | SYSTOLIC BLOOD PRESSURE: 112 MMHG | WEIGHT: 175 LBS | HEART RATE: 60 BPM | BODY MASS INDEX: 25.92 KG/M2 | DIASTOLIC BLOOD PRESSURE: 72 MMHG

## 2019-10-04 LAB — ANA SER IF-ACNC: NEGATIVE

## 2019-10-04 PROCEDURE — 99214 OFFICE O/P EST MOD 30 MIN: CPT

## 2019-10-04 RX ORDER — UBIDECARENONE/VIT E ACET 100MG-5
CAPSULE ORAL
Refills: 0 | Status: DISCONTINUED | COMMUNITY
End: 2019-10-04

## 2019-10-04 NOTE — REVIEW OF SYSTEMS
[Fever] : no fever [Chills] : no chills [Feeling Tired] : feeling tired [Feeling Poorly] : not feeling poorly [Recent Weight Gain (___ Lbs)] : no recent weight gain [Recent Weight Loss (___ Lbs)] : no recent weight loss [Negative] : Heme/Lymph

## 2019-10-04 NOTE — HISTORY OF PRESENT ILLNESS
[Needlestick Exposure] : no needlestick exposure [Infected Sexual Partner] : no infected sexual partner [IV Drug Use] : no IV drug use [Tattoo] : no tattoos [Body Piercing] : no body piercing [Hemodialysis] : no hemodialysis [Transfusion before 1992] : no transfusion before 1992 [Transplant before 1992] : no transplant before 1992 [Incarceration] : no incarceration [Alcohol Abuse] : no alcohol abuse [Autoimmune Disorder] : autoimmune disorder [Household Contact to HBV] : no household contact to HBV [Travel to Endemic Area] : no travel to an endemic area [Occupational Exposure] : no occupational exposure [Cocaine Use] : no cocaine use [Moderate] : moderate in severity [Improving] : improving [Fatigue] : fatigue worsened [Malaise] : denies malaise [Fever] : denies fever [Diffuse Joint Pain (Arthralgias)] : denies arthralgias [Muscle Aches, Generalized (Myalgias)] : denies myalgias [Yellow Skin Or Eyes (Jaundice)] : denies jaundice [Abdominal Pain] : denies abdominal pain [Urine Tests Nonspecific Abnormal Findings Biliuria] : denies dark urine [Light Colored Bowel Movement (Acholic Stools)] : denies pale stools [Insomnia] : denies insomnia [Skin: Rash] : denies rash [Itching (Pruritus)] : denies pruritus [Shortness Of Breath] : denies shortness of breath [Wt Gain ___ Lbs] : no recent weight gain [Wt Loss ___ Lbs] : no recent weight loss [de-identified] : This patient underwent liver biopsy in September of 2017 her markedly elevated aminotransferase values and had confirmed autoimmune hepatitis.  The patient responded to prednisone and azathioprine with resolution of jaundice and improvement in aminotransferase values.  Liver biopsy at that time showed stage III-4 fibrosis.  The patient has had an upper endoscopy, which did not reveal esophageal varices.  In aminotransferase values normalized and the patient discontinued prednisone in October of 2018 and discontinued azathioprine in November of 2018.  The patient has been diagnosed with osteoporosis and had low vitamin D levels now in the normal level supplemented with 4000 units per day.  This patient does not have bowel symptoms.  Son has celiac disease.\par \par Laboratory tests in October of 2019 show normal aminotransferase values platelet count of 161,000 and normal.  Prothrombin time.  Fibroscan shows improvement in liver, stiffness, with minimal fibrosis.  The patient does have complaints of fatigue, and a family history of thyroid disease, although she herself has not been found.  Thyroid deficient.

## 2019-10-04 NOTE — CONSULT LETTER
[Dear  ___] : Dear  [unfilled], [Courtesy Letter:] : I had the pleasure of seeing your patient, [unfilled], in my office today. [Please see my note below.] : Please see my note below. [Sincerely,] : Sincerely, [FreeTextEntry3] : .sig

## 2019-10-04 NOTE — ASSESSMENT
[FreeTextEntry1] : This patient has history of autoimmune hepatitis, now in remission off immunosuppression.  The patient does have low bone density.  I will check for celiac disease and recheck vitamin D levels.  The patient has been on vitamin A supplements and vitamin A levels will be checked.  I have her to discontinue vitamin K supplementation until results are available.  The patient, her endoscopy report to me for my records.  I will see the patient in followup in 6 months.

## 2019-10-04 NOTE — PHYSICAL EXAM
[Scleral Icterus] : No Scleral Icterus [Hepatojugular Reflux] : patient did not have a sustained hepatojugular reflux [Spider Angioma] : No spider angioma(s) were observed [Abdominal Bruit] : no abdominal bruit [Ascites Fluid Wave] : no ascites fluid wave [Abdominal  Ascites] : no ascites [Ascites Tense] : ascites is not tense [Ascites Shifting Dullness] : no shifting dullness of ascites [Splenomegaly] : no splenomegaly [Caput Medusae] : no caput medusae observed [Liver Size (___ Cm)] : Liver size [unfilled] cm [Liver Palpable ___ Finger Breadths Below Costal Margin] : Liver edge was palpable [unfilled] finger breadths below costal margin [Non-Tender] : non-tender [Smooth] : smooth [Jaundice] : No jaundice [Asterixis] : no asterixis observed [Palmar Erythema] : no Palmar Erythema [General Appearance - Alert] : alert [General Appearance - In No Acute Distress] : in no acute distress [General Appearance - Well-Appearing] : healthy appearing [General Appearance - Well Developed] : well developed [General Appearance - Well Nourished] : well nourished [Sclera] : the sclera and conjunctiva were normal [Outer Ear] : the ears and nose were normal in appearance [Examination Of The Oral Cavity] : the lips and gums were normal [Neck Appearance] : the appearance of the neck was normal [Neck Cervical Mass (___cm)] : no neck mass was observed [Jugular Venous Distention Increased] : there was no jugular-venous distention [Thyroid Diffuse Enlargement] : the thyroid was not enlarged [Respiration, Rhythm And Depth] : normal respiratory rhythm and effort [Exaggerated Use Of Accessory Muscles For Inspiration] : no accessory muscle use [Auscultation Breath Sounds / Voice Sounds] : lungs were clear to auscultation bilaterally [Heart Rate And Rhythm] : heart rate was normal and rhythm regular [Heart Sounds] : normal S1 and S2 [Murmurs] : no murmurs [Edema] : there was no peripheral edema [Bowel Sounds] : normal bowel sounds [Abdomen Soft] : soft [Abdomen Tenderness] : non-tender [Abdomen Mass (___ Cm)] : no abdominal mass palpated [Supraclavicular Lymph Nodes Enlarged Bilaterally] : supraclavicular [No CVA Tenderness] : no ~M costovertebral angle tenderness [No Spinal Tenderness] : no spinal tenderness [Nail Clubbing] : no clubbing  or cyanosis of the fingernails [Involuntary Movements] : no involuntary movements were seen [Skin Color & Pigmentation] : normal skin color and pigmentation [Skin Turgor] : normal skin turgor [] : no rash [Skin Lesions] : no skin lesions [FreeTextEntry1] : prior surgery left arm with no evidence of melanoma; tan [No Focal Deficits] : no focal deficits [Oriented To Time, Place, And Person] : oriented to person, place, and time

## 2019-10-07 LAB
25(OH)D3 SERPL-MCNC: 36.8 NG/ML
ALBUMIN SERPL ELPH-MCNC: 4.2 G/DL
ALP BLD-CCNC: 116 U/L
ALT SERPL-CCNC: 18 U/L
ANION GAP SERPL CALC-SCNC: 12 MMOL/L
AST SERPL-CCNC: 27 U/L
BASOPHILS # BLD AUTO: 0.02 K/UL
BASOPHILS NFR BLD AUTO: 0.4 %
BILIRUB SERPL-MCNC: 0.3 MG/DL
BUN SERPL-MCNC: 13 MG/DL
CALCIUM SERPL-MCNC: 9.5 MG/DL
CHLORIDE SERPL-SCNC: 105 MMOL/L
CO2 SERPL-SCNC: 24 MMOL/L
CREAT SERPL-MCNC: 0.48 MG/DL
DEPRECATED KAPPA LC FREE/LAMBDA SER: 0.89 RATIO
EOSINOPHIL # BLD AUTO: 0.14 K/UL
EOSINOPHIL NFR BLD AUTO: 3.1 %
GLUCOSE SERPL-MCNC: 97 MG/DL
HCT VFR BLD CALC: 41.1 %
HGB BLD-MCNC: 13.4 G/DL
IGA SER QL IEP: 118 MG/DL
IGG SER QL IEP: 774 MG/DL
IGM SER QL IEP: 126 MG/DL
IMM GRANULOCYTES NFR BLD AUTO: 0.2 %
IRON SATN MFR SERPL: 34 %
IRON SERPL-MCNC: 120 UG/DL
KAPPA LC CSF-MCNC: 1.3 MG/DL
KAPPA LC SERPL-MCNC: 1.16 MG/DL
LYMPHOCYTES # BLD AUTO: 1.06 K/UL
LYMPHOCYTES NFR BLD AUTO: 23.7 %
MAN DIFF?: NORMAL
MCHC RBC-ENTMCNC: 28.6 PG
MCHC RBC-ENTMCNC: 32.6 GM/DL
MCV RBC AUTO: 87.8 FL
MITOCHONDRIA AB SER IF-ACNC: NORMAL
MONOCYTES # BLD AUTO: 0.5 K/UL
MONOCYTES NFR BLD AUTO: 11.2 %
NEUTROPHILS # BLD AUTO: 2.75 K/UL
NEUTROPHILS NFR BLD AUTO: 61.4 %
PLATELET # BLD AUTO: 167 K/UL
POTASSIUM SERPL-SCNC: 4.9 MMOL/L
PROT SERPL-MCNC: 6.7 G/DL
RBC # BLD: 4.68 M/UL
RBC # FLD: 13.2 %
SMOOTH MUSCLE AB SER QL IF: ABNORMAL
SODIUM SERPL-SCNC: 141 MMOL/L
THYROGLOB AB SERPL-ACNC: <20 IU/ML
THYROPEROXIDASE AB SERPL IA-ACNC: 11.1 IU/ML
TIBC SERPL-MCNC: 358 UG/DL
TSH SERPL-ACNC: 2.07 UIU/ML
UIBC SERPL-MCNC: 238 UG/DL
WBC # FLD AUTO: 4.48 K/UL

## 2019-10-08 LAB — VIT A SERPL-MCNC: 34.4 UG/DL

## 2019-10-11 ENCOUNTER — APPOINTMENT (OUTPATIENT)
Dept: HEPATOLOGY | Facility: CLINIC | Age: 64
End: 2019-10-11

## 2019-10-11 LAB
CELIAC DISEASE INTERPRETATION: NORMAL
CELIAC GENE PAIRS PRESENT: NO
DQ ALPHA 1: NORMAL
DQ BETA 1: NORMAL
IMMUNOGLOBULIN A (IGA): 118 MG/DL

## 2020-08-21 LAB
AFP-TM SERPL-MCNC: 6.3 NG/ML
BASOPHILS # BLD AUTO: 0.03 K/UL
BASOPHILS NFR BLD AUTO: 0.7 %
EOSINOPHIL # BLD AUTO: 0.11 K/UL
EOSINOPHIL NFR BLD AUTO: 2.6 %
HCT VFR BLD CALC: 40.9 %
HGB BLD-MCNC: 13.4 G/DL
IMM GRANULOCYTES NFR BLD AUTO: 0 %
LYMPHOCYTES # BLD AUTO: 1.43 K/UL
LYMPHOCYTES NFR BLD AUTO: 33.2 %
MAN DIFF?: NORMAL
MCHC RBC-ENTMCNC: 28.8 PG
MCHC RBC-ENTMCNC: 32.8 GM/DL
MCV RBC AUTO: 87.8 FL
MONOCYTES # BLD AUTO: 0.49 K/UL
MONOCYTES NFR BLD AUTO: 11.4 %
NEUTROPHILS # BLD AUTO: 2.25 K/UL
NEUTROPHILS NFR BLD AUTO: 52.1 %
PLATELET # BLD AUTO: 181 K/UL
RBC # BLD: 4.66 M/UL
RBC # FLD: 15.7 %
WBC # FLD AUTO: 4.31 K/UL

## 2020-08-24 LAB
ALBUMIN SERPL ELPH-MCNC: 3.9 G/DL
ALP BLD-CCNC: 424 U/L
ALT SERPL-CCNC: 999 U/L
ANION GAP SERPL CALC-SCNC: 11 MMOL/L
AST SERPL-CCNC: 1537 U/L
BILIRUB SERPL-MCNC: 3.8 MG/DL
BUN SERPL-MCNC: 14 MG/DL
CALCIUM SERPL-MCNC: 9.2 MG/DL
CHLORIDE SERPL-SCNC: 105 MMOL/L
CO2 SERPL-SCNC: 23 MMOL/L
CREAT SERPL-MCNC: 0.52 MG/DL
DEPRECATED KAPPA LC FREE/LAMBDA SER: 1.84 RATIO
GLUCOSE SERPL-MCNC: 96 MG/DL
IGA SER QL IEP: 188 MG/DL
IGG SER QL IEP: 2108 MG/DL
IGM SER QL IEP: 202 MG/DL
KAPPA LC CSF-MCNC: 2.08 MG/DL
KAPPA LC SERPL-MCNC: 3.83 MG/DL
POTASSIUM SERPL-SCNC: 4.1 MMOL/L
PROT SERPL-MCNC: 7.3 G/DL
SARS-COV-2 IGG SERPL IA-ACNC: <0.1 INDEX
SARS-COV-2 IGG SERPL QL IA: NEGATIVE
SODIUM SERPL-SCNC: 139 MMOL/L

## 2020-08-25 LAB
25(OH)D3 SERPL-MCNC: 45.7 NG/ML
INR PPP: 1.02 RATIO
PT BLD: 12 SEC

## 2020-08-26 ENCOUNTER — APPOINTMENT (OUTPATIENT)
Dept: HEPATOLOGY | Facility: CLINIC | Age: 65
End: 2020-08-26
Payer: MEDICARE

## 2020-08-26 VITALS
HEART RATE: 73 BPM | DIASTOLIC BLOOD PRESSURE: 82 MMHG | OXYGEN SATURATION: 99 % | HEIGHT: 69 IN | SYSTOLIC BLOOD PRESSURE: 129 MMHG

## 2020-08-26 LAB
ALBUMIN SERPL ELPH-MCNC: 3.7 G/DL
ALP BLD-CCNC: 458 U/L
ALT SERPL-CCNC: 1024 U/L
ANA PAT FLD IF-IMP: ABNORMAL
ANA SER IF-ACNC: ABNORMAL
ANION GAP SERPL CALC-SCNC: 14 MMOL/L
AST SERPL-CCNC: 1697 U/L
BILIRUB DIRECT SERPL-MCNC: 3.2 MG/DL
BILIRUB SERPL-MCNC: 4.3 MG/DL
BUN SERPL-MCNC: 13 MG/DL
CALCIUM SERPL-MCNC: 9.4 MG/DL
CHLORIDE SERPL-SCNC: 104 MMOL/L
CK SERPL-CCNC: 67 U/L
CMV IGM SERPL QL: 13.9 AU/ML
CMV IGM SERPL QL: NEGATIVE
CO2 SERPL-SCNC: 20 MMOL/L
CREAT SERPL-MCNC: 0.51 MG/DL
EBV EA AB SER IA-ACNC: 53.3 U/ML
EBV EA AB TITR SER IF: POSITIVE
EBV EA IGG SER QL IA: >600 U/ML
EBV EA IGG SER-ACNC: POSITIVE
EBV EA IGM SER IA-ACNC: NEGATIVE
EBV PATRN SPEC IB-IMP: NORMAL
EBV VCA IGG SER IA-ACNC: 630 U/ML
EBV VCA IGM SER QL IA: <10 U/ML
EPSTEIN-BARR VIRUS CAPSID ANTIGEN IGG: POSITIVE
GLUCOSE SERPL-MCNC: 101 MG/DL
HAV IGM SER QL: NONREACTIVE
HBV SURFACE AB SER QL: NONREACTIVE
HBV SURFACE AG SER QL: NONREACTIVE
HCV AB SER QL: NONREACTIVE
HCV S/CO RATIO: 0.14 S/CO
HEPATITIS A IGG ANTIBODY: NONREACTIVE
LKM AB SER QL IF: <20.1 UNITS
POTASSIUM SERPL-SCNC: 4 MMOL/L
PROT SERPL-MCNC: 7.4 G/DL
SMOOTH MUSCLE AB SER QL IF: ABNORMAL
SODIUM SERPL-SCNC: 138 MMOL/L

## 2020-08-26 PROCEDURE — 99214 OFFICE O/P EST MOD 30 MIN: CPT

## 2020-08-26 NOTE — HISTORY OF PRESENT ILLNESS
[Infected Sexual Partner] : no infected sexual partner [Needlestick Exposure] : no needlestick exposure [Tattoo] : no tattoos [IV Drug Use] : no IV drug use [Body Piercing] : no body piercing [Transfusion before 1992] : no transfusion before 1992 [Transplant before 1992] : no transplant before 1992 [Hemodialysis] : no hemodialysis [Autoimmune Disorder] : autoimmune disorder [Alcohol Abuse] : no alcohol abuse [Incarceration] : no incarceration [Occupational Exposure] : no occupational exposure [Travel to Endemic Area] : no travel to an endemic area [Household Contact to HBV] : no household contact to HBV [Worsening] : worsening [Severe] : severe [Cocaine Use] : no cocaine use [Fatigue] : fatigue worsened [Malaise] : denies malaise [Muscle Aches, Generalized (Myalgias)] : denies myalgias [Fever] : denies fever [Diffuse Joint Pain (Arthralgias)] : denies arthralgias [Abdominal Pain] : denies abdominal pain [Yellow Skin Or Eyes (Jaundice)] : jaundice worsened [Insomnia] : denies insomnia [Urine Tests Nonspecific Abnormal Findings Biliuria] : dark urine worsened [Light Colored Bowel Movement (Acholic Stools)] : pale stools worsened [Itching (Pruritus)] : denies pruritus [Skin: Rash] : denies rash [Shortness Of Breath] : denies shortness of breath [Wt Gain ___ Lbs] : no recent weight gain [de-identified] : This patient was evaluated for elevated liver tests in September of 2017 with a liver biopsy confirming autoimmune hepatitis.  The patient was treated with prednisone and azathioprine with response and resolution of jaundice Liver biopsy at that time showed stage III-4 fibrosis however prior liver imaging with MRI revealed a normal appearing liver without evidence of portal hypertension or cirrhosis. The patient had an upper endoscopy which did not reveal any evidence of esophageal varices.  Amino transferases values normalized and the patient discontinued prednisone in October of 2018 and discontinued azathioprine in November of 2018.  The patient has had low bone density and low vitamin D levels which have now been supplemented and vitamin D levels are normal.  The patient was diagnosed with melanoma of her left arm as a consequence of skin cancer screening at the time of initiation of immunosuppressive medication.  The patient has had fiber scansshowing minimal liver fibrosis.  She has felt well exercises regularly.  The patient does have a prior history of gastric bypass surgery about 15 years ago.\par \par On routine followup laboratory tests showed aminotransferase values had suddenly jumped from 20 to about 1000.  The patient did not have any new medication travel or other known precipitant for this liver test elevation.  Hepatitis testing for viral hepatitis was negative.  Immunoglobulin levels were markedly elevated smooth muscle antibody was also found to be positive.  Confirmatory testing revealed consistent elevation of aminotransferase values and bilirubin.  On questioning the patient reports being tired.  She has no change in mental status or fluid retention. [Wt Loss ___ Lbs] : no recent weight loss

## 2020-08-26 NOTE — ASSESSMENT
[FreeTextEntry1] : This patient has evidence of reactivation of her autoimmune hepatitis.  With her low bone density I will attempt to control her autoimmune disease with budesonide 3 mg 3 times daily.  I will not initially used azathioprine the patient will have skin cancer screening and azathioprine may be added in the future.  The patient will have a bone density assessment I exam skin examand also had MRI with MRCP.  The patient will have repeat laboratory testing in one week to assess efficacy of her current regimen.

## 2020-08-26 NOTE — PHYSICAL EXAM
[Scleral Icterus] : No Scleral Icterus [Hepatojugular Reflux] : patient did not have a sustained hepatojugular reflux [Spider Angioma] : No spider angioma(s) were observed [Abdominal  Ascites] : no ascites [Abdominal Bruit] : no abdominal bruit [Ascites Fluid Wave] : no ascites fluid wave [Ascites Tense] : ascites is not tense [Ascites Shifting Dullness] : no shifting dullness of ascites [Splenomegaly] : no splenomegaly [Caput Medusae] : no caput medusae observed [Liver Palpable ___ Finger Breadths Below Costal Margin] : Liver edge was palpable [unfilled] finger breadths below costal margin [Liver Size (___ Cm)] : Liver size [unfilled] cm [Non-Tender] : non-tender [Asterixis] : no asterixis observed [Smooth] : smooth [Palmar Erythema] : no Palmar Erythema [Jaundice] : Jaundice [General Appearance - Alert] : alert [General Appearance - In No Acute Distress] : in no acute distress [Examination Of The Oral Cavity] : the lips and gums were normal [Outer Ear] : the ears and nose were normal in appearance [Neck Appearance] : the appearance of the neck was normal [Neck Cervical Mass (___cm)] : no neck mass was observed [] : no respiratory distress [Jugular Venous Distention Increased] : there was no jugular-venous distention [Exaggerated Use Of Accessory Muscles For Inspiration] : no accessory muscle use [Respiration, Rhythm And Depth] : normal respiratory rhythm and effort [Edema] : there was no peripheral edema [Heart Rate And Rhythm] : heart rate was normal and rhythm regular [Abdomen Soft] : soft [Bowel Sounds] : normal bowel sounds [Abdomen Tenderness] : non-tender [Abdomen Mass (___ Cm)] : no abdominal mass palpated [Supraclavicular Lymph Nodes Enlarged Bilaterally] : supraclavicular [Nail Clubbing] : no clubbing  or cyanosis of the fingernails [Involuntary Movements] : no involuntary movements were seen [No Focal Deficits] : no focal deficits [FreeTextEntry1] : jaundice [Oriented To Time, Place, And Person] : oriented to person, place, and time

## 2020-08-26 NOTE — CONSULT LETTER
[Dear  ___] : Dear  [unfilled], [Please see my note below.] : Please see my note below. [Courtesy Letter:] : I had the pleasure of seeing your patient, [unfilled], in my office today. [Sincerely,] : Sincerely, [FreeTextEntry3] : Bill Lion Jr., M.D.\par [facility]\par [address]\[place] [address]\par [phone]\par

## 2020-08-27 LAB
IGG4 SER-MCNC: 9.1 MG/DL
MITOCHONDRIA AB SER IF-ACNC: NORMAL
SMOOTH MUSCLE AB SER QL IF: ABNORMAL

## 2020-08-31 LAB
ALBUMIN SERPL ELPH-MCNC: 3.5 G/DL
ALP BLD-CCNC: 420 U/L
ALT SERPL-CCNC: 692 U/L
ANION GAP SERPL CALC-SCNC: 12 MMOL/L
AST SERPL-CCNC: 720 U/L
BASOPHILS # BLD AUTO: 0.02 K/UL
BASOPHILS NFR BLD AUTO: 0.4 %
BILIRUB SERPL-MCNC: 1.8 MG/DL
BUN SERPL-MCNC: 13 MG/DL
CALCIUM SERPL-MCNC: 9.1 MG/DL
CHLORIDE SERPL-SCNC: 104 MMOL/L
CO2 SERPL-SCNC: 22 MMOL/L
CREAT SERPL-MCNC: 0.48 MG/DL
EOSINOPHIL # BLD AUTO: 0.11 K/UL
EOSINOPHIL NFR BLD AUTO: 2 %
GLUCOSE SERPL-MCNC: 101 MG/DL
HCT VFR BLD CALC: 39.5 %
HGB BLD-MCNC: 12.9 G/DL
IMM GRANULOCYTES NFR BLD AUTO: 0.2 %
INR PPP: 1 RATIO
LYMPHOCYTES # BLD AUTO: 1.57 K/UL
LYMPHOCYTES NFR BLD AUTO: 28.4 %
MAN DIFF?: NORMAL
MCHC RBC-ENTMCNC: 28.7 PG
MCHC RBC-ENTMCNC: 32.7 GM/DL
MCV RBC AUTO: 88 FL
MONOCYTES # BLD AUTO: 0.6 K/UL
MONOCYTES NFR BLD AUTO: 10.9 %
NEUTROPHILS # BLD AUTO: 3.21 K/UL
NEUTROPHILS NFR BLD AUTO: 58.1 %
PLATELET # BLD AUTO: 208 K/UL
POTASSIUM SERPL-SCNC: 4.3 MMOL/L
PROT SERPL-MCNC: 7.1 G/DL
PT BLD: 11.8 SEC
RBC # BLD: 4.49 M/UL
RBC # FLD: 16.8 %
SODIUM SERPL-SCNC: 138 MMOL/L
WBC # FLD AUTO: 5.52 K/UL

## 2020-09-11 ENCOUNTER — APPOINTMENT (OUTPATIENT)
Dept: MRI IMAGING | Facility: CLINIC | Age: 65
End: 2020-09-11
Payer: MEDICARE

## 2020-09-11 ENCOUNTER — OUTPATIENT (OUTPATIENT)
Dept: OUTPATIENT SERVICES | Facility: HOSPITAL | Age: 65
LOS: 1 days | End: 2020-09-11
Payer: MEDICARE

## 2020-09-11 DIAGNOSIS — K75.4 AUTOIMMUNE HEPATITIS: ICD-10-CM

## 2020-09-11 PROCEDURE — 76391 MR ELASTOGRAPHY: CPT | Mod: 26

## 2020-09-11 PROCEDURE — A9585: CPT

## 2020-09-11 PROCEDURE — 76391 MR ELASTOGRAPHY: CPT

## 2020-09-11 PROCEDURE — 74183 MRI ABD W/O CNTR FLWD CNTR: CPT | Mod: 26

## 2020-09-11 PROCEDURE — 74183 MRI ABD W/O CNTR FLWD CNTR: CPT

## 2020-09-23 ENCOUNTER — APPOINTMENT (OUTPATIENT)
Dept: RADIOLOGY | Facility: HOSPITAL | Age: 65
End: 2020-09-23

## 2020-09-28 LAB
ALBUMIN SERPL ELPH-MCNC: 3.8 G/DL
ALP BLD-CCNC: 233 U/L
ALT SERPL-CCNC: 171 U/L
ANION GAP SERPL CALC-SCNC: 13 MMOL/L
AST SERPL-CCNC: 152 U/L
BASOPHILS # BLD AUTO: 0.02 K/UL
BASOPHILS NFR BLD AUTO: 0.4 %
BILIRUB SERPL-MCNC: 0.6 MG/DL
BUN SERPL-MCNC: 15 MG/DL
CALCIUM SERPL-MCNC: 9.1 MG/DL
CHLORIDE SERPL-SCNC: 105 MMOL/L
CO2 SERPL-SCNC: 22 MMOL/L
CREAT SERPL-MCNC: 0.5 MG/DL
EOSINOPHIL # BLD AUTO: 0.14 K/UL
EOSINOPHIL NFR BLD AUTO: 2.7 %
GLUCOSE SERPL-MCNC: 86 MG/DL
HCT VFR BLD CALC: 39.6 %
HGB BLD-MCNC: 13 G/DL
IMM GRANULOCYTES NFR BLD AUTO: 0.2 %
INR PPP: 0.95 RATIO
LYMPHOCYTES # BLD AUTO: 1.99 K/UL
LYMPHOCYTES NFR BLD AUTO: 38 %
MAN DIFF?: NORMAL
MCHC RBC-ENTMCNC: 29 PG
MCHC RBC-ENTMCNC: 32.8 GM/DL
MCV RBC AUTO: 88.4 FL
MONOCYTES # BLD AUTO: 0.5 K/UL
MONOCYTES NFR BLD AUTO: 9.6 %
NEUTROPHILS # BLD AUTO: 2.57 K/UL
NEUTROPHILS NFR BLD AUTO: 49.1 %
PLATELET # BLD AUTO: 197 K/UL
POTASSIUM SERPL-SCNC: 3.9 MMOL/L
PROT SERPL-MCNC: 6.6 G/DL
PT BLD: 11.3 SEC
RBC # BLD: 4.48 M/UL
RBC # FLD: 15.1 %
SODIUM SERPL-SCNC: 140 MMOL/L
WBC # FLD AUTO: 5.23 K/UL

## 2020-09-30 ENCOUNTER — APPOINTMENT (OUTPATIENT)
Dept: HEPATOLOGY | Facility: CLINIC | Age: 65
End: 2020-09-30
Payer: MEDICARE

## 2020-09-30 VITALS — HEART RATE: 73 BPM | SYSTOLIC BLOOD PRESSURE: 123 MMHG | DIASTOLIC BLOOD PRESSURE: 73 MMHG | OXYGEN SATURATION: 97 %

## 2020-09-30 DIAGNOSIS — M85.80 OTHER SPECIFIED DISORDERS OF BONE DENSITY AND STRUCTURE, UNSPECIFIED SITE: ICD-10-CM

## 2020-09-30 PROCEDURE — 99214 OFFICE O/P EST MOD 30 MIN: CPT

## 2020-09-30 NOTE — PHYSICAL EXAM
[Scleral Icterus] : No Scleral Icterus [Hepatojugular Reflux] : patient did not have a sustained hepatojugular reflux [Spider Angioma] : No spider angioma(s) were observed [Abdominal Bruit] : no abdominal bruit [Abdominal  Ascites] : no ascites [Ascites Fluid Wave] : no ascites fluid wave [Ascites Tense] : ascites is not tense [Ascites Shifting Dullness] : no shifting dullness of ascites [Splenomegaly] : no splenomegaly [Caput Medusae] : no caput medusae observed [Liver Size (___ Cm)] : Liver size [unfilled] cm [Liver Palpable ___ Finger Breadths Below Costal Margin] : Liver edge was palpable [unfilled] finger breadths below costal margin [Non-Tender] : non-tender [Asterixis] : no asterixis observed [Jaundice] : No jaundice [Palmar Erythema] : no Palmar Erythema [General Appearance - Alert] : alert [General Appearance - In No Acute Distress] : in no acute distress [General Appearance - Well Nourished] : well nourished [General Appearance - Well Developed] : well developed [General Appearance - Well-Appearing] : healthy appearing [Sclera] : the sclera and conjunctiva were normal [Outer Ear] : the ears and nose were normal in appearance [Examination Of The Oral Cavity] : the lips and gums were normal [Neck Appearance] : the appearance of the neck was normal [Neck Cervical Mass (___cm)] : no neck mass was observed [Jugular Venous Distention Increased] : there was no jugular-venous distention [Respiration, Rhythm And Depth] : normal respiratory rhythm and effort [Exaggerated Use Of Accessory Muscles For Inspiration] : no accessory muscle use [Heart Rate And Rhythm] : heart rate was normal and rhythm regular [Edema] : there was no peripheral edema [Bowel Sounds] : normal bowel sounds [Abdomen Tenderness] : non-tender [] : no hepato-splenomegaly [Abdomen Mass (___ Cm)] : no abdominal mass palpated [Supraclavicular Lymph Nodes Enlarged Bilaterally] : supraclavicular [Nail Clubbing] : no clubbing  or cyanosis of the fingernails [Involuntary Movements] : no involuntary movements were seen [Skin Color & Pigmentation] : normal skin color and pigmentation [Skin Turgor] : normal skin turgor [No Focal Deficits] : no focal deficits [Oriented To Time, Place, And Person] : oriented to person, place, and time

## 2020-09-30 NOTE — CONSULT LETTER
[Dear  ___] : Dear  [unfilled], [Courtesy Letter:] : I had the pleasure of seeing your patient, [unfilled], in my office today. [Please see my note below.] : Please see my note below. [Sincerely,] : Sincerely, [FreeTextEntry3] : Bill Lion Jr., M.D.\par [facility]\par [address]\[place] [address]\par [phone]\par \par

## 2020-09-30 NOTE — HISTORY OF PRESENT ILLNESS
[Severe] : severe [Improving] : improving [Fatigue] : denies fatigue [Malaise] : denies malaise [Fever] : denies fever [Diffuse Joint Pain (Arthralgias)] : improved arthralgias [Muscle Aches, Generalized (Myalgias)] : denies myalgias [Yellow Skin Or Eyes (Jaundice)] : denies jaundice [Abdominal Pain] : denies abdominal pain [Urine Tests Nonspecific Abnormal Findings Biliuria] : denies dark urine [Light Colored Bowel Movement (Acholic Stools)] : denies pale stools [Insomnia] : denies insomnia [Skin: Rash] : denies rash [Itching (Pruritus)] : denies pruritus [Shortness Of Breath] : denies shortness of breath [Wt Gain ___ Lbs] : no recent weight gain [Wt Loss ___ Lbs] : no recent weight loss [de-identified] : This patient has history of biopsy confirmed autoimmune hepatitis, presenting with liver test elevated in September of 2017.  The patient responded to prednisone and azathioprine treatment and liver biopsy at that time showed stage 3-4 fibrosis.  However, MRI imaging revealed a normal-appearing liver and fiber scan showed minimal fibrosis.  The patient had upper endoscopy, which did not reveal esophageal varices.  The patient does have a low bone density and prednisone was discontinued in October of 2018 and azathioprine discontinued in November of 2018.  The patient was diagnosed with melanoma, which was successfully resected.\par \par The patient had been stable with normal liver tests, however, followup revealed rise in aminotransferase values to values near 1000 in August of 2020.  The patient was treated with budesonide 3 mg 3 times daily with prompt improvement in aminotransferase values.  Currently, test results show aminotransferase values between 1-200.  Hepatic synthetic function.  Remains normal.  The patient did have MRI with Elastography, which suggested the presence of cirrhosis.\par \par The patient walks and runs for exercise and has noted pain in her right hip.

## 2020-09-30 NOTE — ASSESSMENT
[FreeTextEntry1] : This patient with autoimmune hepatitis.  Has had a flare of activity and is now responding to budesonide retreatment.  I will I will continue budesonide alone and follow her response.  The patient has complained of right hip pain, and we'll see her primary care provider to further evaluate.  The patient is on budesonide, which should have less systemic side effects, however, if pain persists, consideration and evaluation for femoral head, necrosis could be considered.  I will monitor her iron levels, vitamin D, and the patient will have repeat bone density.  Assessment to assess her osteoporosis documented in 2017.  I will see the patient back in the office and 2 months with repeat laboratory testing during the interval

## 2020-10-05 LAB
25(OH)D3 SERPL-MCNC: 39.7 NG/ML
ALBUMIN SERPL ELPH-MCNC: 3.7 G/DL
ALP BLD-CCNC: 234 U/L
ALT SERPL-CCNC: 146 U/L
ANION GAP SERPL CALC-SCNC: 11 MMOL/L
AST SERPL-CCNC: 133 U/L
BASOPHILS # BLD AUTO: 0.03 K/UL
BASOPHILS NFR BLD AUTO: 0.5 %
BILIRUB SERPL-MCNC: 0.6 MG/DL
BUN SERPL-MCNC: 15 MG/DL
CALCIUM SERPL-MCNC: 9.4 MG/DL
CHLORIDE SERPL-SCNC: 104 MMOL/L
CHOLEST SERPL-MCNC: 202 MG/DL
CHOLEST/HDLC SERPL: 2 RATIO
CO2 SERPL-SCNC: 24 MMOL/L
CREAT SERPL-MCNC: 0.52 MG/DL
EOSINOPHIL # BLD AUTO: 0.13 K/UL
EOSINOPHIL NFR BLD AUTO: 2.2 %
ESTIMATED AVERAGE GLUCOSE: 105 MG/DL
GLUCOSE SERPL-MCNC: 93 MG/DL
HBA1C MFR BLD HPLC: 5.3 %
HCT VFR BLD CALC: 39.6 %
HDLC SERPL-MCNC: 99 MG/DL
HGB BLD-MCNC: 12.8 G/DL
IMM GRANULOCYTES NFR BLD AUTO: 0.2 %
INR PPP: 0.96 RATIO
LDLC SERPL CALC-MCNC: 83 MG/DL
LYMPHOCYTES # BLD AUTO: 2.03 K/UL
LYMPHOCYTES NFR BLD AUTO: 33.9 %
MAN DIFF?: NORMAL
MCHC RBC-ENTMCNC: 28.4 PG
MCHC RBC-ENTMCNC: 32.3 GM/DL
MCV RBC AUTO: 87.8 FL
MONOCYTES # BLD AUTO: 0.61 K/UL
MONOCYTES NFR BLD AUTO: 10.2 %
NEUTROPHILS # BLD AUTO: 3.18 K/UL
NEUTROPHILS NFR BLD AUTO: 53 %
PLATELET # BLD AUTO: 225 K/UL
POTASSIUM SERPL-SCNC: 3.7 MMOL/L
PROT SERPL-MCNC: 6.3 G/DL
PT BLD: 11.4 SEC
RBC # BLD: 4.51 M/UL
RBC # FLD: 14.6 %
SODIUM SERPL-SCNC: 139 MMOL/L
TRIGL SERPL-MCNC: 96 MG/DL
WBC # FLD AUTO: 5.99 K/UL

## 2020-10-06 ENCOUNTER — APPOINTMENT (OUTPATIENT)
Dept: HEPATOLOGY | Facility: CLINIC | Age: 65
End: 2020-10-06

## 2020-10-06 LAB
DEPRECATED KAPPA LC FREE/LAMBDA SER: 1.4 RATIO
IGA SER QL IEP: 182 MG/DL
IGG SER QL IEP: 1265 MG/DL
IGM SER QL IEP: 197 MG/DL
KAPPA LC CSF-MCNC: 1.28 MG/DL
KAPPA LC SERPL-MCNC: 1.79 MG/DL

## 2020-10-28 ENCOUNTER — APPOINTMENT (OUTPATIENT)
Dept: HEPATOLOGY | Facility: CLINIC | Age: 65
End: 2020-10-28

## 2020-11-16 ENCOUNTER — NON-APPOINTMENT (OUTPATIENT)
Age: 65
End: 2020-11-16

## 2020-11-30 ENCOUNTER — NON-APPOINTMENT (OUTPATIENT)
Age: 65
End: 2020-11-30

## 2020-12-16 ENCOUNTER — APPOINTMENT (OUTPATIENT)
Dept: HEPATOLOGY | Facility: CLINIC | Age: 65
End: 2020-12-16

## 2020-12-22 ENCOUNTER — RX RENEWAL (OUTPATIENT)
Age: 65
End: 2020-12-22

## 2021-01-06 ENCOUNTER — APPOINTMENT (OUTPATIENT)
Dept: HEPATOLOGY | Facility: CLINIC | Age: 66
End: 2021-01-06

## 2021-01-12 LAB
ABO + RH PNL BLD: NORMAL
ALBUMIN SERPL ELPH-MCNC: 4.2 G/DL
ALP BLD-CCNC: 137 U/L
ALT SERPL-CCNC: 36 U/L
ANION GAP SERPL CALC-SCNC: 14 MMOL/L
AST SERPL-CCNC: 42 U/L
BASOPHILS # BLD AUTO: 0.04 K/UL
BASOPHILS NFR BLD AUTO: 0.6 %
BILIRUB SERPL-MCNC: 0.4 MG/DL
BUN SERPL-MCNC: 24 MG/DL
CALCIUM SERPL-MCNC: 9.5 MG/DL
CHLORIDE SERPL-SCNC: 102 MMOL/L
CO2 SERPL-SCNC: 23 MMOL/L
CREAT SERPL-MCNC: 0.83 MG/DL
EOSINOPHIL # BLD AUTO: 0.08 K/UL
EOSINOPHIL NFR BLD AUTO: 1.2 %
HCT VFR BLD CALC: 41.8 %
HGB BLD-MCNC: 13.3 G/DL
IMM GRANULOCYTES NFR BLD AUTO: 0.2 %
LYMPHOCYTES # BLD AUTO: 1.58 K/UL
LYMPHOCYTES NFR BLD AUTO: 24.5 %
MAN DIFF?: NORMAL
MCHC RBC-ENTMCNC: 28.2 PG
MCHC RBC-ENTMCNC: 31.8 GM/DL
MCV RBC AUTO: 88.7 FL
MONOCYTES # BLD AUTO: 0.57 K/UL
MONOCYTES NFR BLD AUTO: 8.9 %
NEUTROPHILS # BLD AUTO: 4.16 K/UL
NEUTROPHILS NFR BLD AUTO: 64.6 %
PLATELET # BLD AUTO: 199 K/UL
POTASSIUM SERPL-SCNC: 4.4 MMOL/L
PROT SERPL-MCNC: 7 G/DL
RBC # BLD: 4.71 M/UL
RBC # FLD: 13.4 %
SODIUM SERPL-SCNC: 139 MMOL/L
WBC # FLD AUTO: 6.44 K/UL

## 2021-02-04 ENCOUNTER — NON-APPOINTMENT (OUTPATIENT)
Age: 66
End: 2021-02-04

## 2021-03-31 ENCOUNTER — NON-APPOINTMENT (OUTPATIENT)
Age: 66
End: 2021-03-31

## 2021-04-21 ENCOUNTER — NON-APPOINTMENT (OUTPATIENT)
Age: 66
End: 2021-04-21

## 2021-05-06 LAB
ALBUMIN SERPL ELPH-MCNC: 3.8 G/DL
ALP BLD-CCNC: 387 U/L
ALT SERPL-CCNC: 760 U/L
ANION GAP SERPL CALC-SCNC: 12 MMOL/L
AST SERPL-CCNC: 823 U/L
BILIRUB SERPL-MCNC: 1.9 MG/DL
BUN SERPL-MCNC: 13 MG/DL
CALCIUM SERPL-MCNC: 9.4 MG/DL
CHLORIDE SERPL-SCNC: 109 MMOL/L
CO2 SERPL-SCNC: 20 MMOL/L
CREAT SERPL-MCNC: 0.49 MG/DL
POTASSIUM SERPL-SCNC: 4.1 MMOL/L
PROT SERPL-MCNC: 7.4 G/DL
SODIUM SERPL-SCNC: 140 MMOL/L

## 2021-05-07 ENCOUNTER — APPOINTMENT (OUTPATIENT)
Dept: HEPATOLOGY | Facility: CLINIC | Age: 66
End: 2021-05-07
Payer: MEDICARE

## 2021-05-07 VITALS
HEART RATE: 83 BPM | SYSTOLIC BLOOD PRESSURE: 135 MMHG | OXYGEN SATURATION: 99 % | DIASTOLIC BLOOD PRESSURE: 82 MMHG | HEIGHT: 69 IN

## 2021-05-07 DIAGNOSIS — K75.4 AUTOIMMUNE HEPATITIS: ICD-10-CM

## 2021-05-07 PROCEDURE — 36415 COLL VENOUS BLD VENIPUNCTURE: CPT

## 2021-05-07 PROCEDURE — 99214 OFFICE O/P EST MOD 30 MIN: CPT | Mod: 25

## 2021-05-07 NOTE — HISTORY OF PRESENT ILLNESS
[Severe] : severe [Worsening] : worsening [Fatigue] : fatigue stable [Malaise] : denies malaise [Fever] : denies fever [Diffuse Joint Pain (Arthralgias)] : denies arthralgias [Muscle Aches, Generalized (Myalgias)] : denies myalgias [Yellow Skin Or Eyes (Jaundice)] : denies jaundice [Abdominal Pain] : denies abdominal pain [Urine Tests Nonspecific Abnormal Findings Biliuria] : denies dark urine [Light Colored Bowel Movement (Acholic Stools)] : denies pale stools [Insomnia] : denies insomnia [Skin: Rash] : denies rash [Itching (Pruritus)] : denies pruritus [Shortness Of Breath] : denies shortness of breath [de-identified] : This patient has history of autoimmune hepatitis with liver biopsy in August of 2017 compatible with autoimmune hepatitis.  The patient has had flares of activity.  Initially controlling activity of her disease with a new exacerbation noted with test results rising in 2021.  A flare of activity in August of 2020 was controlled with budesonide however, values did not return to normal and subsequently baltazar with ALT value of 700, AST value of 800.  The patient has had MR Elastography in August of 2020 showing stage IV fibrosis.  The patient was attempted to be controlled with budesonide however, this therapy has been ineffective.\par \par The patient has mild fatigue, but no other symptoms.  Specifically, no arthritis or rash.  The patient has had prior melanoma of her left arm, with no evidence of recurrence and she is followed by dermatology.

## 2021-05-07 NOTE — ASSESSMENT
[FreeTextEntry1] : This patient has evidence of exacerbation of her autoimmune hepatitis, not controlled with budesonide.  The patient will be changed to prednisone 20 mg and azathioprine 50 mg daily.  I will have followup laboratory test in 2 weeks and again in 6 weeks with a visit in 2 months.  The patient will see dermatology in followup.  I will assess her vitamin D status and followup liver imaging will be pursued.  In the future with control of her inflammatory liver disease.

## 2021-05-07 NOTE — PHYSICAL EXAM
[Scleral Icterus] : No Scleral Icterus [Hepatojugular Reflux] : patient did not have a sustained hepatojugular reflux [Spider Angioma] : No spider angioma(s) were observed [Abdominal Bruit] : no abdominal bruit [Abdominal  Ascites] : no ascites [Ascites Fluid Wave] : no ascites fluid wave [Ascites Tense] : ascites is not tense [Ascites Shifting Dullness] : no shifting dullness of ascites [Splenomegaly] : no splenomegaly [Liver Size (___ Cm)] : Liver size [unfilled] cm [Caput Medusae] : no caput medusae observed [Liver Palpable ___ Finger Breadths Below Costal Margin] : Liver edge was palpable [unfilled] finger breadths below costal margin [Non-Tender] : non-tender [Irregular] : irregular [Asterixis] : no asterixis observed [Jaundice] : No jaundice [Palmar Erythema] : no Palmar Erythema [General Appearance - Alert] : alert [General Appearance - In No Acute Distress] : in no acute distress [General Appearance - Well Developed] : well developed [General Appearance - Well Nourished] : well nourished [General Appearance - Well-Appearing] : healthy appearing [Sclera] : the sclera and conjunctiva were normal [Outer Ear] : the ears and nose were normal in appearance [Examination Of The Oral Cavity] : the lips and gums were normal [Neck Appearance] : the appearance of the neck was normal [Neck Cervical Mass (___cm)] : no neck mass was observed [] : no respiratory distress [Respiration, Rhythm And Depth] : normal respiratory rhythm and effort [Exaggerated Use Of Accessory Muscles For Inspiration] : no accessory muscle use [Heart Rate And Rhythm] : heart rate was normal and rhythm regular [Edema] : there was no peripheral edema [Bowel Sounds] : normal bowel sounds [Abdomen Tenderness] : non-tender [Supraclavicular Lymph Nodes Enlarged Bilaterally] : supraclavicular [Nail Clubbing] : no clubbing  or cyanosis of the fingernails [Involuntary Movements] : no involuntary movements were seen [Skin Color & Pigmentation] : normal skin color and pigmentation [Skin Turgor] : normal skin turgor [FreeTextEntry1] : sun damage [No Focal Deficits] : no focal deficits [Oriented To Time, Place, And Person] : oriented to person, place, and time

## 2021-05-08 LAB
25(OH)D3 SERPL-MCNC: 36.4 NG/ML
CHOLEST SERPL-MCNC: 172 MG/DL
DEPRECATED KAPPA LC FREE/LAMBDA SER: 1.36 RATIO
ESTIMATED AVERAGE GLUCOSE: 108 MG/DL
HBA1C MFR BLD HPLC: 5.4 %
HDLC SERPL-MCNC: 71 MG/DL
IGA SER QL IEP: 186 MG/DL
IGG SER QL IEP: 1694 MG/DL
IGM SER QL IEP: 214 MG/DL
KAPPA LC CSF-MCNC: 1.72 MG/DL
KAPPA LC SERPL-MCNC: 2.34 MG/DL
LDLC SERPL CALC-MCNC: 82 MG/DL
NONHDLC SERPL-MCNC: 101 MG/DL
TRIGL SERPL-MCNC: 94 MG/DL

## 2021-05-10 LAB — LKM AB SER QL IF: <20.1 UNITS

## 2021-05-11 LAB — IGG4 SER-MCNC: 6 MG/DL

## 2021-05-12 LAB
SMOOTH MUSCLE AB SER QL IF: ABNORMAL
SOLUBLE LIVER IGG SER IA-ACNC: 0.8

## 2021-05-25 LAB
ALBUMIN SERPL ELPH-MCNC: 3.8 G/DL
ALP BLD-CCNC: 218 U/L
ALT SERPL-CCNC: 229 U/L
ANION GAP SERPL CALC-SCNC: 11 MMOL/L
AST SERPL-CCNC: 192 U/L
BILIRUB SERPL-MCNC: 0.9 MG/DL
BUN SERPL-MCNC: 15 MG/DL
CALCIUM SERPL-MCNC: 9.5 MG/DL
CHLORIDE SERPL-SCNC: 105 MMOL/L
CO2 SERPL-SCNC: 23 MMOL/L
CREAT SERPL-MCNC: 0.5 MG/DL
GLUCOSE SERPL-MCNC: 85 MG/DL
POTASSIUM SERPL-SCNC: 4.1 MMOL/L
PROT SERPL-MCNC: 6.8 G/DL
SODIUM SERPL-SCNC: 139 MMOL/L

## 2021-05-25 RX ORDER — BUDESONIDE 3 MG/1
3 CAPSULE, COATED PELLETS ORAL
Qty: 270 | Refills: 3 | Status: DISCONTINUED | COMMUNITY
Start: 2020-08-25 | End: 2021-05-25

## 2021-06-21 LAB
ALBUMIN SERPL ELPH-MCNC: 3.8 G/DL
ALP BLD-CCNC: 149 U/L
ALT SERPL-CCNC: 68 U/L
ANION GAP SERPL CALC-SCNC: 9 MMOL/L
AST SERPL-CCNC: 68 U/L
BASOPHILS # BLD AUTO: 0.04 K/UL
BASOPHILS NFR BLD AUTO: 0.6 %
BILIRUB SERPL-MCNC: 0.5 MG/DL
BUN SERPL-MCNC: 16 MG/DL
CALCIUM SERPL-MCNC: 9.6 MG/DL
CHLORIDE SERPL-SCNC: 105 MMOL/L
CO2 SERPL-SCNC: 26 MMOL/L
CREAT SERPL-MCNC: 0.6 MG/DL
EOSINOPHIL # BLD AUTO: 0.11 K/UL
EOSINOPHIL NFR BLD AUTO: 1.6 %
GLUCOSE SERPL-MCNC: 83 MG/DL
HCT VFR BLD CALC: 39.7 %
HGB BLD-MCNC: 12.8 G/DL
IMM GRANULOCYTES NFR BLD AUTO: 0.3 %
LYMPHOCYTES # BLD AUTO: 2.65 K/UL
LYMPHOCYTES NFR BLD AUTO: 39.6 %
MAN DIFF?: NORMAL
MCHC RBC-ENTMCNC: 28.5 PG
MCHC RBC-ENTMCNC: 32.2 GM/DL
MCV RBC AUTO: 88.4 FL
MONOCYTES # BLD AUTO: 0.6 K/UL
MONOCYTES NFR BLD AUTO: 9 %
NEUTROPHILS # BLD AUTO: 3.28 K/UL
NEUTROPHILS NFR BLD AUTO: 48.9 %
PLATELET # BLD AUTO: 222 K/UL
POTASSIUM SERPL-SCNC: 4 MMOL/L
PROT SERPL-MCNC: 6.5 G/DL
RBC # BLD: 4.49 M/UL
RBC # FLD: 15.3 %
SODIUM SERPL-SCNC: 140 MMOL/L
WBC # FLD AUTO: 6.7 K/UL

## 2021-06-22 ENCOUNTER — NON-APPOINTMENT (OUTPATIENT)
Age: 66
End: 2021-06-22

## 2021-06-22 LAB
DEPRECATED KAPPA LC FREE/LAMBDA SER: 1.2 RATIO
IGA SER QL IEP: 156 MG/DL
IGG SER QL IEP: 1087 MG/DL
IGM SER QL IEP: 222 MG/DL
KAPPA LC CSF-MCNC: 1.5 MG/DL
KAPPA LC SERPL-MCNC: 1.8 MG/DL

## 2021-07-23 LAB
ALBUMIN SERPL ELPH-MCNC: 4.3 G/DL
ALP BLD-CCNC: 116 U/L
ALT SERPL-CCNC: 37 U/L
ANION GAP SERPL CALC-SCNC: 11 MMOL/L
AST SERPL-CCNC: 45 U/L
BASOPHILS # BLD AUTO: 0.04 K/UL
BASOPHILS NFR BLD AUTO: 0.6 %
BILIRUB SERPL-MCNC: 0.3 MG/DL
BUN SERPL-MCNC: 12 MG/DL
CALCIUM SERPL-MCNC: 9.6 MG/DL
CHLORIDE SERPL-SCNC: 104 MMOL/L
CO2 SERPL-SCNC: 26 MMOL/L
CREAT SERPL-MCNC: 0.68 MG/DL
EOSINOPHIL # BLD AUTO: 0.16 K/UL
EOSINOPHIL NFR BLD AUTO: 2.6 %
GLUCOSE SERPL-MCNC: 83 MG/DL
HCT VFR BLD CALC: 40.6 %
HGB BLD-MCNC: 13.3 G/DL
IMM GRANULOCYTES NFR BLD AUTO: 0.3 %
LYMPHOCYTES # BLD AUTO: 2.4 K/UL
LYMPHOCYTES NFR BLD AUTO: 39 %
MAN DIFF?: NORMAL
MCHC RBC-ENTMCNC: 29.2 PG
MCHC RBC-ENTMCNC: 32.8 GM/DL
MCV RBC AUTO: 89.2 FL
MONOCYTES # BLD AUTO: 0.64 K/UL
MONOCYTES NFR BLD AUTO: 10.4 %
NEUTROPHILS # BLD AUTO: 2.9 K/UL
NEUTROPHILS NFR BLD AUTO: 47.1 %
PLATELET # BLD AUTO: 201 K/UL
POTASSIUM SERPL-SCNC: 4.2 MMOL/L
PROT SERPL-MCNC: 6.6 G/DL
RBC # BLD: 4.55 M/UL
RBC # FLD: 14.5 %
SODIUM SERPL-SCNC: 141 MMOL/L
WBC # FLD AUTO: 6.16 K/UL

## 2021-08-12 ENCOUNTER — APPOINTMENT (OUTPATIENT)
Dept: HEPATOLOGY | Facility: CLINIC | Age: 66
End: 2021-08-12
Payer: MEDICARE

## 2021-08-12 VITALS
DIASTOLIC BLOOD PRESSURE: 77 MMHG | BODY MASS INDEX: 25.92 KG/M2 | HEART RATE: 81 BPM | HEIGHT: 69 IN | SYSTOLIC BLOOD PRESSURE: 121 MMHG | OXYGEN SATURATION: 96 % | WEIGHT: 175 LBS

## 2021-08-12 DIAGNOSIS — Z85.820 PERSONAL HISTORY OF MALIGNANT MELANOMA OF SKIN: ICD-10-CM

## 2021-08-12 DIAGNOSIS — H26.9 UNSPECIFIED CATARACT: ICD-10-CM

## 2021-08-12 PROCEDURE — 99214 OFFICE O/P EST MOD 30 MIN: CPT

## 2021-08-12 NOTE — PHYSICAL EXAM
[Scleral Icterus] : No Scleral Icterus [Hepatojugular Reflux] : patient did not have a sustained hepatojugular reflux [Spider Angioma] : No spider angioma(s) were observed [Abdominal Bruit] : no abdominal bruit [Abdominal  Ascites] : no ascites [Ascites Fluid Wave] : no ascites fluid wave [Ascites Tense] : ascites is not tense [Ascites Shifting Dullness] : no shifting dullness of ascites [Splenomegaly] : no splenomegaly [Caput Medusae] : no caput medusae observed [Liver Size (___ Cm)] : Liver size [unfilled] cm [Liver Palpable ___ Finger Breadths Below Costal Margin] : Liver edge was palpable [unfilled] finger breadths below costal margin [Non-Tender] : non-tender [Irregular] : irregular [Asterixis] : no asterixis observed [Jaundice] : No jaundice [Palmar Erythema] : no Palmar Erythema [General Appearance - Alert] : alert [General Appearance - In No Acute Distress] : in no acute distress [General Appearance - Well Nourished] : well nourished [General Appearance - Well Developed] : well developed [General Appearance - Well-Appearing] : healthy appearing [Sclera] : the sclera and conjunctiva were normal [Outer Ear] : the ears and nose were normal in appearance [Examination Of The Oral Cavity] : the lips and gums were normal [Neck Appearance] : the appearance of the neck was normal [Neck Cervical Mass (___cm)] : no neck mass was observed [] : no respiratory distress [Respiration, Rhythm And Depth] : normal respiratory rhythm and effort [Exaggerated Use Of Accessory Muscles For Inspiration] : no accessory muscle use [Heart Rate And Rhythm] : heart rate was normal and rhythm regular [Edema] : there was no peripheral edema [Bowel Sounds] : normal bowel sounds [Abdomen Tenderness] : non-tender [Supraclavicular Lymph Nodes Enlarged Bilaterally] : supraclavicular [Nail Clubbing] : no clubbing  or cyanosis of the fingernails [Involuntary Movements] : no involuntary movements were seen [Skin Color & Pigmentation] : normal skin color and pigmentation [Skin Turgor] : normal skin turgor [FreeTextEntry1] : sun damage [No Focal Deficits] : no focal deficits [Oriented To Time, Place, And Person] : oriented to person, place, and time

## 2021-08-12 NOTE — HISTORY OF PRESENT ILLNESS
[de-identified] : This patient has history of autoimmune hepatitis confirmed with biopsy in 2017.  The patient was treated with prednisone and azathioprine with control liver disease.  After discontinuing therapy however, the patient has experienced an exacerbation of her disease with reinstitution of prednisone and azathioprine.  Laboratory testing has improved markedly on prednisone 20 mg daily, azathioprine 50 mg daily.  The patient has had MR Elastography in 2020 showing cirrhosis with no evidence of liver mass, lesion.  The patient reports that she had endoscopy in 2018 without evidence of known esophageal varices.  The patient will supply me with the report of her endoscopy.\par \par The patient has had an eye exam shows a faint age-appropriate cataract.  The patient has had bone density.  Assessment in the past, but not recently.  The patient has had vitamin D deficiency in the past has stopped oral supplements while being treated for her exacerbation of immune mediated hepatitis.\par \par The patient is a history of malignant melanoma of the left forearm was no evidence of recurrent disease.  She has not had a dermatology vision recently.\par \par The patient feels well.  He is enthusiastic about her near-normal sedation of biochemical liver tests.

## 2021-08-12 NOTE — ASSESSMENT
[FreeTextEntry1] : This patient is in hepatitis.  Has MR Elastography evidence of cirrhosis and is on prednisone 20 mg daily and azathioprine 50 mg daily.  With normalization of liver tests.  Prednisone will be reduced to 10 milligrams daily.  The patient will have repeat liver tests in one month in 3 months with a return visit in 3 months.\par \par The patient will have a vitamin D.  Assessment has been told and supplement as 2000 minutes vitamin D3 daily.  The patient will also have a bone density.  Assessment.  The patient will also have iron studies as the patient has a prior history of iron deficiency.  The patient has been advised to follow up with dermatology for routine cancer surveillance.\par \par As the patient surprised her endoscopy report.  Consideration will be given to repeat upper endoscopy to rule out development of esophageal varices.

## 2021-08-27 ENCOUNTER — APPOINTMENT (OUTPATIENT)
Dept: RADIOLOGY | Facility: HOSPITAL | Age: 66
End: 2021-08-27
Payer: MEDICARE

## 2021-08-27 ENCOUNTER — APPOINTMENT (OUTPATIENT)
Dept: ULTRASOUND IMAGING | Facility: HOSPITAL | Age: 66
End: 2021-08-27
Payer: MEDICARE

## 2021-08-27 ENCOUNTER — RESULT REVIEW (OUTPATIENT)
Age: 66
End: 2021-08-27

## 2021-08-27 ENCOUNTER — OUTPATIENT (OUTPATIENT)
Dept: OUTPATIENT SERVICES | Facility: HOSPITAL | Age: 66
LOS: 1 days | End: 2021-08-27
Payer: MEDICARE

## 2021-08-27 DIAGNOSIS — Z00.8 ENCOUNTER FOR OTHER GENERAL EXAMINATION: ICD-10-CM

## 2021-08-27 PROCEDURE — 76700 US EXAM ABDOM COMPLETE: CPT

## 2021-08-27 PROCEDURE — 77080 DXA BONE DENSITY AXIAL: CPT

## 2021-08-27 PROCEDURE — 76700 US EXAM ABDOM COMPLETE: CPT | Mod: 26

## 2021-08-27 PROCEDURE — 77080 DXA BONE DENSITY AXIAL: CPT | Mod: 26

## 2021-08-31 ENCOUNTER — NON-APPOINTMENT (OUTPATIENT)
Age: 66
End: 2021-08-31

## 2021-09-03 ENCOUNTER — NON-APPOINTMENT (OUTPATIENT)
Age: 66
End: 2021-09-03

## 2021-09-10 LAB
25(OH)D3 SERPL-MCNC: 48 NG/ML
IRON SATN MFR SERPL: 21 %
IRON SERPL-MCNC: 80 UG/DL
TIBC SERPL-MCNC: 373 UG/DL
UIBC SERPL-MCNC: 293 UG/DL

## 2021-09-14 ENCOUNTER — NON-APPOINTMENT (OUTPATIENT)
Age: 66
End: 2021-09-14

## 2021-09-15 LAB
ALBUMIN SERPL ELPH-MCNC: 4 G/DL
ALP BLD-CCNC: 112 U/L
ALT SERPL-CCNC: 29 U/L
ANION GAP SERPL CALC-SCNC: 18 MMOL/L
AST SERPL-CCNC: 46 U/L
BILIRUB SERPL-MCNC: 0.3 MG/DL
BUN SERPL-MCNC: 19 MG/DL
CALCIUM SERPL-MCNC: 9.4 MG/DL
CHLORIDE SERPL-SCNC: 108 MMOL/L
CO2 SERPL-SCNC: 19 MMOL/L
CREAT SERPL-MCNC: 0.62 MG/DL
GLUCOSE SERPL-MCNC: 85 MG/DL
POTASSIUM SERPL-SCNC: 4.3 MMOL/L
PROT SERPL-MCNC: 6.3 G/DL
SODIUM SERPL-SCNC: 146 MMOL/L

## 2021-09-22 LAB
DEPRECATED KAPPA LC FREE/LAMBDA SER: 0.92 RATIO
IGA SER QL IEP: 145 MG/DL
IGG SER QL IEP: 907 MG/DL
IGM SER QL IEP: 178 MG/DL
KAPPA LC CSF-MCNC: 1.32 MG/DL
KAPPA LC SERPL-MCNC: 1.21 MG/DL

## 2021-11-20 LAB
25(OH)D3 SERPL-MCNC: 34.5 NG/ML
ALBUMIN SERPL ELPH-MCNC: 4.3 G/DL
ALP BLD-CCNC: 117 U/L
ALT SERPL-CCNC: 19 U/L
ANION GAP SERPL CALC-SCNC: 12 MMOL/L
AST SERPL-CCNC: 29 U/L
BASOPHILS # BLD AUTO: 0.02 K/UL
BASOPHILS NFR BLD AUTO: 0.4 %
BILIRUB SERPL-MCNC: 0.4 MG/DL
BUN SERPL-MCNC: 12 MG/DL
CALCIUM SERPL-MCNC: 9.3 MG/DL
CHLORIDE SERPL-SCNC: 106 MMOL/L
CO2 SERPL-SCNC: 24 MMOL/L
COVID-19 SPIKE DOMAIN ANTIBODY INTERPRETATION: POSITIVE
CREAT SERPL-MCNC: 0.53 MG/DL
DEPRECATED KAPPA LC FREE/LAMBDA SER: 1.06 RATIO
EOSINOPHIL # BLD AUTO: 0.12 K/UL
EOSINOPHIL NFR BLD AUTO: 2.5 %
GLUCOSE SERPL-MCNC: 87 MG/DL
HBV SURFACE AB SER QL: NONREACTIVE
HCT VFR BLD CALC: 41.2 %
HEPATITIS A IGG ANTIBODY: NONREACTIVE
HGB BLD-MCNC: 13.4 G/DL
IGA SER QL IEP: 137 MG/DL
IGG SER QL IEP: 877 MG/DL
IGM SER QL IEP: 170 MG/DL
IMM GRANULOCYTES NFR BLD AUTO: 0.2 %
KAPPA LC CSF-MCNC: 1.42 MG/DL
KAPPA LC SERPL-MCNC: 1.51 MG/DL
LYMPHOCYTES # BLD AUTO: 2.34 K/UL
LYMPHOCYTES NFR BLD AUTO: 49.5 %
MAN DIFF?: NORMAL
MCHC RBC-ENTMCNC: 29.3 PG
MCHC RBC-ENTMCNC: 32.5 GM/DL
MCV RBC AUTO: 90.2 FL
MONOCYTES # BLD AUTO: 0.52 K/UL
MONOCYTES NFR BLD AUTO: 11 %
NEUTROPHILS # BLD AUTO: 1.72 K/UL
NEUTROPHILS NFR BLD AUTO: 36.4 %
PLATELET # BLD AUTO: 179 K/UL
POTASSIUM SERPL-SCNC: 3.8 MMOL/L
PROT SERPL-MCNC: 6.2 G/DL
RBC # BLD: 4.57 M/UL
RBC # FLD: 13.6 %
SARS-COV-2 AB SERPL IA-ACNC: >250 U/ML
SODIUM SERPL-SCNC: 142 MMOL/L
WBC # FLD AUTO: 4.73 K/UL

## 2021-11-22 ENCOUNTER — NON-APPOINTMENT (OUTPATIENT)
Age: 66
End: 2021-11-22

## 2021-12-02 ENCOUNTER — APPOINTMENT (OUTPATIENT)
Dept: HEPATOLOGY | Facility: CLINIC | Age: 66
End: 2021-12-02

## 2021-12-16 ENCOUNTER — APPOINTMENT (OUTPATIENT)
Dept: HEPATOLOGY | Facility: CLINIC | Age: 66
End: 2021-12-16
Payer: MEDICARE

## 2021-12-16 VITALS
DIASTOLIC BLOOD PRESSURE: 72 MMHG | BODY MASS INDEX: 25.92 KG/M2 | WEIGHT: 175 LBS | OXYGEN SATURATION: 99 % | HEIGHT: 69 IN | SYSTOLIC BLOOD PRESSURE: 115 MMHG | HEART RATE: 70 BPM

## 2021-12-16 DIAGNOSIS — D50.9 IRON DEFICIENCY ANEMIA, UNSPECIFIED: ICD-10-CM

## 2021-12-16 PROCEDURE — 99214 OFFICE O/P EST MOD 30 MIN: CPT

## 2021-12-16 NOTE — HISTORY OF PRESENT ILLNESS
[Severe] : severe [Unchanged] : unchanged [Fatigue] : denies fatigue [Malaise] : denies malaise [Fever] : denies fever [Diffuse Joint Pain (Arthralgias)] : denies arthralgias [Muscle Aches, Generalized (Myalgias)] : denies myalgias [Yellow Skin Or Eyes (Jaundice)] : denies jaundice [Abdominal Pain] : denies abdominal pain [Urine Tests Nonspecific Abnormal Findings Biliuria] : denies dark urine [Light Colored Bowel Movement (Acholic Stools)] : denies pale stools [Insomnia] : denies insomnia [Skin: Rash] : denies rash [Itching (Pruritus)] : denies pruritus [Shortness Of Breath] : denies shortness of breath [Wt Gain ___ Lbs] : no recent weight gain [Wt Loss ___ Lbs] : no recent weight loss [de-identified] : This patientHas history of autoimmune hepatitis, treated successfully with prednisone and azathioprine.  After discontinuation of therapy.  The patient had a relapse of hepatitis, which has now been read, controlled with prednisone 10 mg, azathioprine 50 mg daily. In November, 2021, biochemical liver tests have now reached normal with normal levels of immunoglobulins.  The patient is continuing on prednisone 10 azathioprine 54 consolidation of her immune hepatitis.\par \par The patient does have an age-appropriate cataract  She has also been diagnosed with osteopenia and has not reliably been taking her vitamin D supplementation.  Although vitamin D levels remained in the low normal range.  The patient reports she has had upper endoscopy by Dr. baum and will contact his office to obtain copies of results. \par \par Imaging studies of the liver Including MR Elastography done September 2020 document established cirrhosis.ultrasound in August of 2021, shows no evidence of ascites and mental status is clear

## 2021-12-16 NOTE — ASSESSMENT
[FreeTextEntry1] : This patient with autoimmune hepatitis with established cirrhosis.  She has had control of her immune mediated hepatitis with prednisone and azathioprine.  The patient has had a flare of activity after discontinuation of medication and will require long-term suppressive therapy and currently she is on prednisone 10 azathioprine 50 and will continue consolidation therapy and maintain normal.  Biochemical liver tests.  Her goal will be gradual reduction in corticosteroid use, likely with continued maintenance azathioprine.\par \par The patient has had melanoma and is advised to maintain updated appointments with dermatology.  Followup

## 2022-01-08 ENCOUNTER — RESULT REVIEW (OUTPATIENT)
Age: 67
End: 2022-01-08

## 2022-01-08 ENCOUNTER — APPOINTMENT (OUTPATIENT)
Dept: MRI IMAGING | Facility: HOSPITAL | Age: 67
End: 2022-01-08
Payer: MEDICARE

## 2022-01-08 ENCOUNTER — OUTPATIENT (OUTPATIENT)
Dept: OUTPATIENT SERVICES | Facility: HOSPITAL | Age: 67
LOS: 1 days | End: 2022-01-08
Payer: MEDICARE

## 2022-01-08 DIAGNOSIS — K75.4 AUTOIMMUNE HEPATITIS: ICD-10-CM

## 2022-01-08 PROCEDURE — G1004: CPT

## 2022-01-08 PROCEDURE — 74183 MRI ABD W/O CNTR FLWD CNTR: CPT | Mod: ME

## 2022-01-08 PROCEDURE — 72197 MRI PELVIS W/O & W/DYE: CPT | Mod: 26,ME

## 2022-01-08 PROCEDURE — A9579: CPT

## 2022-01-08 PROCEDURE — 72197 MRI PELVIS W/O & W/DYE: CPT | Mod: ME

## 2022-01-08 PROCEDURE — 74183 MRI ABD W/O CNTR FLWD CNTR: CPT | Mod: 26,ME

## 2022-01-21 ENCOUNTER — OUTPATIENT (OUTPATIENT)
Dept: OUTPATIENT SERVICES | Facility: HOSPITAL | Age: 67
LOS: 1 days | End: 2022-01-21
Payer: MEDICARE

## 2022-01-21 ENCOUNTER — APPOINTMENT (OUTPATIENT)
Dept: UROLOGY | Facility: CLINIC | Age: 67
End: 2022-01-21
Payer: MEDICARE

## 2022-01-21 VITALS
TEMPERATURE: 97.6 F | RESPIRATION RATE: 17 BRPM | HEART RATE: 78 BPM | HEIGHT: 69 IN | BODY MASS INDEX: 25.92 KG/M2 | WEIGHT: 175 LBS | SYSTOLIC BLOOD PRESSURE: 155 MMHG | DIASTOLIC BLOOD PRESSURE: 83 MMHG

## 2022-01-21 PROCEDURE — 88112 CYTOPATH CELL ENHANCE TECH: CPT | Mod: 26

## 2022-01-21 PROCEDURE — 52000 CYSTOURETHROSCOPY: CPT

## 2022-01-21 NOTE — HISTORY OF PRESENT ILLNESS
[None] : no symptoms [FreeTextEntry1] : Ct abd done yearly for autoimmune liver disease . This year a MRI of the pelvis revealed a suspicios lesion in the bladder . No history of microscopic hematuria or gross hematuria \par History of smoking . No family history of any caner in the  tract

## 2022-01-26 LAB — URINE CYTOLOGY: NORMAL

## 2022-02-03 ENCOUNTER — NON-APPOINTMENT (OUTPATIENT)
Age: 67
End: 2022-02-03

## 2022-02-04 ENCOUNTER — OUTPATIENT (OUTPATIENT)
Dept: OUTPATIENT SERVICES | Facility: HOSPITAL | Age: 67
LOS: 1 days | End: 2022-02-04

## 2022-02-04 VITALS
DIASTOLIC BLOOD PRESSURE: 80 MMHG | WEIGHT: 195.99 LBS | OXYGEN SATURATION: 99 % | HEIGHT: 68 IN | SYSTOLIC BLOOD PRESSURE: 120 MMHG | HEART RATE: 69 BPM | TEMPERATURE: 98 F | RESPIRATION RATE: 16 BRPM

## 2022-02-04 DIAGNOSIS — Z98.84 BARIATRIC SURGERY STATUS: Chronic | ICD-10-CM

## 2022-02-04 DIAGNOSIS — N32.89 OTHER SPECIFIED DISORDERS OF BLADDER: ICD-10-CM

## 2022-02-04 DIAGNOSIS — K75.4 AUTOIMMUNE HEPATITIS: ICD-10-CM

## 2022-02-04 LAB
ALBUMIN SERPL ELPH-MCNC: 3.8 G/DL — SIGNIFICANT CHANGE UP (ref 3.3–5)
ALP SERPL-CCNC: 119 U/L — SIGNIFICANT CHANGE UP (ref 40–120)
ALT FLD-CCNC: 18 U/L — SIGNIFICANT CHANGE UP (ref 4–33)
ANION GAP SERPL CALC-SCNC: 11 MMOL/L — SIGNIFICANT CHANGE UP (ref 7–14)
AST SERPL-CCNC: 26 U/L — SIGNIFICANT CHANGE UP (ref 4–32)
BILIRUB SERPL-MCNC: 0.3 MG/DL — SIGNIFICANT CHANGE UP (ref 0.2–1.2)
BUN SERPL-MCNC: 18 MG/DL — SIGNIFICANT CHANGE UP (ref 7–23)
CALCIUM SERPL-MCNC: 9.1 MG/DL — SIGNIFICANT CHANGE UP (ref 8.4–10.5)
CHLORIDE SERPL-SCNC: 105 MMOL/L — SIGNIFICANT CHANGE UP (ref 98–107)
CO2 SERPL-SCNC: 26 MMOL/L — SIGNIFICANT CHANGE UP (ref 22–31)
CREAT SERPL-MCNC: 0.58 MG/DL — SIGNIFICANT CHANGE UP (ref 0.5–1.3)
GLUCOSE SERPL-MCNC: 79 MG/DL — SIGNIFICANT CHANGE UP (ref 70–99)
HCT VFR BLD CALC: 38.8 % — SIGNIFICANT CHANGE UP (ref 34.5–45)
HGB BLD-MCNC: 12.2 G/DL — SIGNIFICANT CHANGE UP (ref 11.5–15.5)
MCHC RBC-ENTMCNC: 28.2 PG — SIGNIFICANT CHANGE UP (ref 27–34)
MCHC RBC-ENTMCNC: 31.4 GM/DL — LOW (ref 32–36)
MCV RBC AUTO: 89.6 FL — SIGNIFICANT CHANGE UP (ref 80–100)
NRBC # BLD: 0 /100 WBCS — SIGNIFICANT CHANGE UP
NRBC # FLD: 0 K/UL — SIGNIFICANT CHANGE UP
PLATELET # BLD AUTO: 148 K/UL — LOW (ref 150–400)
POTASSIUM SERPL-MCNC: 3.6 MMOL/L — SIGNIFICANT CHANGE UP (ref 3.5–5.3)
POTASSIUM SERPL-SCNC: 3.6 MMOL/L — SIGNIFICANT CHANGE UP (ref 3.5–5.3)
PROT SERPL-MCNC: 6.4 G/DL — SIGNIFICANT CHANGE UP (ref 6–8.3)
RBC # BLD: 4.33 M/UL — SIGNIFICANT CHANGE UP (ref 3.8–5.2)
RBC # FLD: 13.4 % — SIGNIFICANT CHANGE UP (ref 10.3–14.5)
SODIUM SERPL-SCNC: 142 MMOL/L — SIGNIFICANT CHANGE UP (ref 135–145)
WBC # BLD: 4.69 K/UL — SIGNIFICANT CHANGE UP (ref 3.8–10.5)
WBC # FLD AUTO: 4.69 K/UL — SIGNIFICANT CHANGE UP (ref 3.8–10.5)

## 2022-02-04 RX ORDER — SODIUM CHLORIDE 9 MG/ML
1000 INJECTION, SOLUTION INTRAVENOUS
Refills: 0 | Status: DISCONTINUED | OUTPATIENT
Start: 2022-02-21 | End: 2022-03-07

## 2022-02-04 NOTE — H&P PST ADULT - ACTIVITY
Walks 1 to 2 blocks, climbs 1 flight of stairs, ADLs Walks more than 1 to 2 blocks, climbs 1 flight of stairs, ADLs

## 2022-02-04 NOTE — H&P PST ADULT - GENITOURINARY COMMENTS
Pt reports she went to routine liver test and did MRI and found to have Pre op dx-  other specified disorders of bladder Pt reports she went for her routine liver test and did MRI and found to have suspicious lesion in the bladder.

## 2022-02-04 NOTE — H&P PST ADULT - NSICDXPASTMEDICALHX_GEN_ALL_CORE_FT
PAST MEDICAL HISTORY:  Autoimmune liver disease     Other specified disorders of bladder      PAST MEDICAL HISTORY:  Autoimmune liver disease     Melanoma left forearm    Other specified disorders of bladder

## 2022-02-04 NOTE — H&P PST ADULT - HISTORY OF PRESENT ILLNESS
66 year old female with pre op dx of other specified disorders of bladder is scheduled for cystoscopy, transurethral resection of bladder tumor

## 2022-02-04 NOTE — H&P PST ADULT - PROBLEM SELECTOR PLAN 1
Patient tentatively scheduled for   Pre-op instructions provided. Pt given verbal and written instructions with teach back on pepcid. Pt verbalized understanding with return demonstration.   Pt strongly advised to follow up with surgeon to discuss COVID testing requirements prior to procedure.     Pending medical evaluation with comparative ekg due to abnormal EKG at PST . ( Pt has appointment on 02/07/2022) Patient tentatively scheduled for  cystoscopy, transurethral resection of bladder tumor on 02/21/2022.  Pre-op instructions provided. Pt given verbal and written instructions with teach back on pepcid. Pt verbalized understanding with return demonstration.   Pt strongly advised to follow up with surgeon to discuss COVID testing requirements prior to procedure.     Pending medical evaluation with comparative ekg due to abnormal EKG at PST . ( Pt has appointment on 02/07/2022)

## 2022-02-06 LAB
CULTURE RESULTS: SIGNIFICANT CHANGE UP
SPECIMEN SOURCE: SIGNIFICANT CHANGE UP

## 2022-02-08 ENCOUNTER — APPOINTMENT (OUTPATIENT)
Dept: FAMILY MEDICINE | Facility: CLINIC | Age: 67
End: 2022-02-08
Payer: MEDICARE

## 2022-02-08 ENCOUNTER — NON-APPOINTMENT (OUTPATIENT)
Age: 67
End: 2022-02-08

## 2022-02-08 VITALS — SYSTOLIC BLOOD PRESSURE: 116 MMHG | DIASTOLIC BLOOD PRESSURE: 76 MMHG

## 2022-02-08 VITALS
SYSTOLIC BLOOD PRESSURE: 141 MMHG | HEIGHT: 69 IN | TEMPERATURE: 97.5 F | DIASTOLIC BLOOD PRESSURE: 76 MMHG | WEIGHT: 191 LBS | HEART RATE: 72 BPM | OXYGEN SATURATION: 98 % | RESPIRATION RATE: 16 BRPM | BODY MASS INDEX: 28.29 KG/M2

## 2022-02-08 LAB
ALBUMIN SERPL ELPH-MCNC: 4 G/DL
ALP BLD-CCNC: 127 U/L
ALT SERPL-CCNC: 19 U/L
ANION GAP SERPL CALC-SCNC: 13 MMOL/L
AST SERPL-CCNC: 31 U/L
BASOPHILS # BLD AUTO: 0.02 K/UL
BASOPHILS NFR BLD AUTO: 0.4 %
BILIRUB SERPL-MCNC: 0.2 MG/DL
BUN SERPL-MCNC: 25 MG/DL
CALCIUM SERPL-MCNC: 9.2 MG/DL
CHLORIDE SERPL-SCNC: 103 MMOL/L
CO2 SERPL-SCNC: 24 MMOL/L
CREAT SERPL-MCNC: 0.68 MG/DL
DEPRECATED KAPPA LC FREE/LAMBDA SER: 1.12 RATIO
EOSINOPHIL # BLD AUTO: 0.1 K/UL
EOSINOPHIL NFR BLD AUTO: 1.9 %
FERRITIN SERPL-MCNC: 16 NG/ML
GLUCOSE SERPL-MCNC: 100 MG/DL
HCT VFR BLD CALC: 38.2 %
HGB BLD-MCNC: 11.9 G/DL
IGA SER QL IEP: 129 MG/DL
IGG SER QL IEP: 812 MG/DL
IGM SER QL IEP: 136 MG/DL
IMM GRANULOCYTES NFR BLD AUTO: 0.2 %
INR PPP: 0.94 RATIO
IRON SATN MFR SERPL: 20 %
IRON SERPL-MCNC: 76 UG/DL
KAPPA LC CSF-MCNC: 1.29 MG/DL
KAPPA LC SERPL-MCNC: 1.44 MG/DL
LYMPHOCYTES # BLD AUTO: 0.95 K/UL
LYMPHOCYTES NFR BLD AUTO: 17.8 %
MAN DIFF?: NORMAL
MCHC RBC-ENTMCNC: 28.1 PG
MCHC RBC-ENTMCNC: 31.2 GM/DL
MCV RBC AUTO: 90.3 FL
MONOCYTES # BLD AUTO: 0.51 K/UL
MONOCYTES NFR BLD AUTO: 9.5 %
NEUTROPHILS # BLD AUTO: 3.76 K/UL
NEUTROPHILS NFR BLD AUTO: 70.2 %
PLATELET # BLD AUTO: 163 K/UL
POTASSIUM SERPL-SCNC: 4.2 MMOL/L
PROT SERPL-MCNC: 6.3 G/DL
PT BLD: 11.2 SEC
RBC # BLD: 4.23 M/UL
RBC # FLD: 13.7 %
SODIUM SERPL-SCNC: 141 MMOL/L
TIBC SERPL-MCNC: 382 UG/DL
UIBC SERPL-MCNC: 307 UG/DL
WBC # FLD AUTO: 5.35 K/UL

## 2022-02-08 PROCEDURE — 93000 ELECTROCARDIOGRAM COMPLETE: CPT

## 2022-02-08 PROCEDURE — 99204 OFFICE O/P NEW MOD 45 MIN: CPT | Mod: 25

## 2022-02-09 PROBLEM — C43.9 MALIGNANT MELANOMA OF SKIN, UNSPECIFIED: Chronic | Status: ACTIVE | Noted: 2022-02-04

## 2022-02-09 PROBLEM — K76.89 OTHER SPECIFIED DISEASES OF LIVER: Chronic | Status: ACTIVE | Noted: 2022-02-04

## 2022-02-09 PROBLEM — N32.89 OTHER SPECIFIED DISORDERS OF BLADDER: Chronic | Status: ACTIVE | Noted: 2022-02-04

## 2022-02-15 ENCOUNTER — NON-APPOINTMENT (OUTPATIENT)
Age: 67
End: 2022-02-15

## 2022-02-15 ENCOUNTER — APPOINTMENT (OUTPATIENT)
Dept: CARDIOLOGY | Facility: CLINIC | Age: 67
End: 2022-02-15
Payer: MEDICARE

## 2022-02-15 VITALS — TEMPERATURE: 97.8 F | OXYGEN SATURATION: 98 % | RESPIRATION RATE: 16 BRPM | HEIGHT: 69 IN | HEART RATE: 78 BPM

## 2022-02-15 PROCEDURE — 93000 ELECTROCARDIOGRAM COMPLETE: CPT | Mod: NC

## 2022-02-15 PROCEDURE — 99204 OFFICE O/P NEW MOD 45 MIN: CPT

## 2022-02-16 NOTE — ASSESSMENT
[Patient Requires Further Testing] : Patient requires further testing [Cardiology consultation] : Cardiology consultation [Continue medications as is] : Continue current medications [As per surgery] : as per surgery [FreeTextEntry5] : NA [FreeTextEntry6] : NA

## 2022-02-16 NOTE — RESULTS/DATA
[] : results reviewed [ECG Reviewed] : reviewed [No Acute Ischemia] : No acute ischemia [Ventricular Rate___] : ventricular rate is [unfilled] beats per minute [FreeTextEntry2] : + PAC

## 2022-02-16 NOTE — REVIEW OF SYSTEMS
[Negative] : Heme/Lymph [Dysuria] : no dysuria [Incontinence] : no incontinence [Nocturia] : no nocturia [Poor Libido] : libido not poor [Hematuria] : no hematuria [Frequency] : no frequency [Vaginal Discharge] : no vaginal discharge [Dysmenorrhea] : no dysmenorrhea [FreeTextEntry8] : + bladder mass

## 2022-02-16 NOTE — HISTORY OF PRESENT ILLNESS
[No Pertinent Cardiac History] : no history of aortic stenosis, atrial fibrillation, coronary artery disease, recent myocardial infarction, or implantable device/pacemaker [No Pertinent Pulmonary History] : no history of asthma, COPD, sleep apnea, or smoking [No Adverse Anesthesia Reaction] : no adverse anesthesia reaction in self or family member [(Patient denies any chest pain, claudication, dyspnea on exertion, orthopnea, palpitations or syncope)] : Patient denies any chest pain, claudication, dyspnea on exertion, orthopnea, palpitations or syncope [Chronic Anticoagulation] : no chronic anticoagulation [Chronic Kidney Disease] : no chronic kidney disease [Diabetes] : no diabetes [FreeTextEntry1] :  cystoscopy, transurethral resection of bladder tumor\par  cystoscopy, transurethral resection of bladder tumor\par Cystoscopy, bladder tumor resection  [FreeTextEntry2] : 02/24/2022 [FreeTextEntry3] : Khari Jefferson [FreeTextEntry4] : PAtient came to establish herself as a new pt, She deneis CP,SOB,abd pain. She is with Hx of autoimmune hepatitis , stable now, Patient was recently dgn with bladder mass, needs a surgery.

## 2022-02-16 NOTE — ADDENDUM
[FreeTextEntry1] : 12/15/2022\par Cardiac clearance noted. Patient is medically optimized for the proposed procedure.

## 2022-02-18 NOTE — ASU PATIENT PROFILE, ADULT - FALL HARM RISK - UNIVERSAL INTERVENTIONS
Bed in lowest position, wheels locked, appropriate side rails in place/Call bell, personal items and telephone in reach/Instruct patient to call for assistance before getting out of bed or chair/Non-slip footwear when patient is out of bed/[place] to call system/Physically safe environment - no spills, clutter or unnecessary equipment/Purposeful Proactive Rounding/Room/bathroom lighting operational, light cord in reach

## 2022-02-20 ENCOUNTER — TRANSCRIPTION ENCOUNTER (OUTPATIENT)
Age: 67
End: 2022-02-20

## 2022-02-21 ENCOUNTER — RESULT REVIEW (OUTPATIENT)
Age: 67
End: 2022-02-21

## 2022-02-21 ENCOUNTER — APPOINTMENT (OUTPATIENT)
Dept: UROLOGY | Facility: HOSPITAL | Age: 67
End: 2022-02-21

## 2022-02-21 ENCOUNTER — OUTPATIENT (OUTPATIENT)
Dept: OUTPATIENT SERVICES | Facility: HOSPITAL | Age: 67
LOS: 1 days | Discharge: HOME | End: 2022-02-21
Payer: MEDICARE

## 2022-02-21 VITALS
HEART RATE: 65 BPM | OXYGEN SATURATION: 99 % | WEIGHT: 195.99 LBS | SYSTOLIC BLOOD PRESSURE: 119 MMHG | RESPIRATION RATE: 14 BRPM | TEMPERATURE: 98 F | DIASTOLIC BLOOD PRESSURE: 66 MMHG | HEIGHT: 68 IN

## 2022-02-21 VITALS
TEMPERATURE: 97 F | HEART RATE: 68 BPM | OXYGEN SATURATION: 99 % | SYSTOLIC BLOOD PRESSURE: 124 MMHG | DIASTOLIC BLOOD PRESSURE: 66 MMHG | RESPIRATION RATE: 16 BRPM

## 2022-02-21 DIAGNOSIS — Z98.84 BARIATRIC SURGERY STATUS: Chronic | ICD-10-CM

## 2022-02-21 DIAGNOSIS — N32.89 OTHER SPECIFIED DISORDERS OF BLADDER: ICD-10-CM

## 2022-02-21 PROCEDURE — 52234 CYSTOSCOPY AND TREATMENT: CPT

## 2022-02-21 PROCEDURE — 88307 TISSUE EXAM BY PATHOLOGIST: CPT | Mod: 26

## 2022-02-21 RX ORDER — SODIUM CHLORIDE 9 MG/ML
1000 INJECTION, SOLUTION INTRAVENOUS
Refills: 0 | Status: DISCONTINUED | OUTPATIENT
Start: 2022-02-21 | End: 2022-03-07

## 2022-02-21 RX ORDER — GENTAMICIN SULFATE 40 MG/ML
160 VIAL (ML) INJECTION ONCE
Refills: 0 | Status: DISCONTINUED | OUTPATIENT
Start: 2022-02-21 | End: 2022-03-07

## 2022-02-21 NOTE — ASU DISCHARGE PLAN (ADULT/PEDIATRIC) - NS MD DC FALL RISK RISK
For information on Fall & Injury Prevention, visit: [email or web address] OR  [email or web address] OR  https://www.cdc.gov/steadi/patient.html

## 2022-02-21 NOTE — ASU DISCHARGE PLAN (ADULT/PEDIATRIC) - CALL YOUR DOCTOR IF YOU HAVE ANY OF THE FOLLOWING:
Bleeding that does not stop/Pain not relieved by Medications/Nausea and vomiting that does not stop/Unable to urinate Bleeding that does not stop/Pain not relieved by Medications/Fever greater than (need to indicate Fahrenheit or Celsius)/Nausea and vomiting that does not stop/Unable to urinate

## 2022-02-21 NOTE — ASU DISCHARGE PLAN (ADULT/PEDIATRIC) - CARE PROVIDER_API CALL
Byron Donovan; MARGARET)  Urology  [address] [place] [address]  Phone: [phone]  Fax: [phone]  Follow Up Time: 2 weeks

## 2022-02-21 NOTE — ASU DISCHARGE PLAN (ADULT/PEDIATRIC) - ASU DC SPECIAL INSTRUCTIONSFT
Some blood in your urine is normal.  Please call the office if you begin to see blood clots or you are unable to urinate.     Please call the office if you experience a fever with temperature >101 F, as this could be a sign of infection.    You may take over the counter pain medication as needed.    Please call Dr. Byron Donovan's office to schedule follow-up.

## 2022-02-24 LAB — SURGICAL PATHOLOGY STUDY: SIGNIFICANT CHANGE UP

## 2022-03-28 ENCOUNTER — NON-APPOINTMENT (OUTPATIENT)
Age: 67
End: 2022-03-28

## 2022-03-31 DIAGNOSIS — N32.89 OTHER SPECIFIED DISORDERS OF BLADDER: ICD-10-CM

## 2022-05-12 LAB
ALBUMIN SERPL ELPH-MCNC: 4 G/DL
ALP BLD-CCNC: 113 U/L
ALT SERPL-CCNC: 13 U/L
ANION GAP SERPL CALC-SCNC: 11 MMOL/L
AST SERPL-CCNC: 20 U/L
BILIRUB SERPL-MCNC: 0.3 MG/DL
BUN SERPL-MCNC: 16 MG/DL
CALCIUM SERPL-MCNC: 9.4 MG/DL
CHLORIDE SERPL-SCNC: 107 MMOL/L
CO2 SERPL-SCNC: 24 MMOL/L
CREAT SERPL-MCNC: 0.59 MG/DL
EGFR: 99 ML/MIN/1.73M2
GGT SERPL-CCNC: 23 U/L
POTASSIUM SERPL-SCNC: 4.1 MMOL/L
PROT SERPL-MCNC: 6.2 G/DL
SODIUM SERPL-SCNC: 142 MMOL/L

## 2022-05-16 LAB
ANA PAT FLD IF-IMP: ABNORMAL
ANA SER IF-ACNC: ABNORMAL
MITOCHONDRIA AB SER IF-ACNC: NORMAL
SMOOTH MUSCLE AB SER QL IF: ABNORMAL

## 2022-05-19 ENCOUNTER — APPOINTMENT (OUTPATIENT)
Dept: HEPATOLOGY | Facility: CLINIC | Age: 67
End: 2022-05-19

## 2022-06-20 ENCOUNTER — NON-APPOINTMENT (OUTPATIENT)
Age: 67
End: 2022-06-20

## 2022-06-28 ENCOUNTER — APPOINTMENT (OUTPATIENT)
Dept: UROLOGY | Facility: CLINIC | Age: 67
End: 2022-06-28
Payer: MEDICARE

## 2022-06-28 ENCOUNTER — OUTPATIENT (OUTPATIENT)
Dept: OUTPATIENT SERVICES | Facility: HOSPITAL | Age: 67
LOS: 1 days | End: 2022-06-28
Payer: MEDICARE

## 2022-06-28 VITALS — SYSTOLIC BLOOD PRESSURE: 115 MMHG | DIASTOLIC BLOOD PRESSURE: 71 MMHG | HEART RATE: 84 BPM

## 2022-06-28 DIAGNOSIS — R35.0 FREQUENCY OF MICTURITION: ICD-10-CM

## 2022-06-28 DIAGNOSIS — Z98.84 BARIATRIC SURGERY STATUS: Chronic | ICD-10-CM

## 2022-06-28 DIAGNOSIS — N32.89 OTHER SPECIFIED DISORDERS OF BLADDER: ICD-10-CM

## 2022-06-28 PROCEDURE — 52000 CYSTOURETHROSCOPY: CPT

## 2022-06-28 RX ORDER — NITROFURANTOIN MACROCRYSTALS 50 MG/1
50 CAPSULE ORAL
Qty: 30 | Refills: 0 | Status: COMPLETED | COMMUNITY
Start: 2022-06-28 | End: 2022-07-28

## 2022-06-29 LAB — BACTERIA UR CULT: NORMAL

## 2022-07-12 LAB
AFP-TM SERPL-MCNC: 5 NG/ML
ALBUMIN SERPL ELPH-MCNC: 4.4 G/DL
ALP BLD-CCNC: 122 U/L
ALT SERPL-CCNC: 15 U/L
ANION GAP SERPL CALC-SCNC: 16 MMOL/L
AST SERPL-CCNC: 24 U/L
BASOPHILS # BLD AUTO: 0.01 K/UL
BASOPHILS NFR BLD AUTO: 0.2 %
BILIRUB SERPL-MCNC: 0.2 MG/DL
BUN SERPL-MCNC: 15 MG/DL
CALCIUM SERPL-MCNC: 9.3 MG/DL
CHLORIDE SERPL-SCNC: 104 MMOL/L
CO2 SERPL-SCNC: 21 MMOL/L
COVID-19 NUCLEOCAPSID  GAM ANTIBODY INTERPRETATION: NEGATIVE
COVID-19 SPIKE DOMAIN ANTIBODY INTERPRETATION: POSITIVE
CREAT SERPL-MCNC: 0.56 MG/DL
EGFR: 100 ML/MIN/1.73M2
EOSINOPHIL # BLD AUTO: 0.01 K/UL
EOSINOPHIL NFR BLD AUTO: 0.2 %
GLUCOSE SERPL-MCNC: 75 MG/DL
HCT VFR BLD CALC: 39.6 %
HGB BLD-MCNC: 12.2 G/DL
IGG SER QL IEP: 849 MG/DL
IMM GRANULOCYTES NFR BLD AUTO: 0.2 %
LYMPHOCYTES # BLD AUTO: 0.86 K/UL
LYMPHOCYTES NFR BLD AUTO: 16.3 %
MAN DIFF?: NORMAL
MCHC RBC-ENTMCNC: 27.5 PG
MCHC RBC-ENTMCNC: 30.8 GM/DL
MCV RBC AUTO: 89.2 FL
MONOCYTES # BLD AUTO: 0.42 K/UL
MONOCYTES NFR BLD AUTO: 7.9 %
NEUTROPHILS # BLD AUTO: 3.98 K/UL
NEUTROPHILS NFR BLD AUTO: 75.2 %
PLATELET # BLD AUTO: 188 K/UL
POTASSIUM SERPL-SCNC: 4.8 MMOL/L
PROT SERPL-MCNC: 6.6 G/DL
RBC # BLD: 4.44 M/UL
RBC # FLD: 14.2 %
SARS-COV-2 AB SERPL IA-ACNC: >250 U/ML
SARS-COV-2 AB SERPL QL IA: 0.07 INDEX
SODIUM SERPL-SCNC: 141 MMOL/L
WBC # FLD AUTO: 5.29 K/UL

## 2022-07-14 ENCOUNTER — APPOINTMENT (OUTPATIENT)
Dept: HEPATOLOGY | Facility: CLINIC | Age: 67
End: 2022-07-14

## 2022-08-15 ENCOUNTER — OUTPATIENT (OUTPATIENT)
Dept: OUTPATIENT SERVICES | Facility: HOSPITAL | Age: 67
LOS: 1 days | End: 2022-08-15
Payer: MEDICARE

## 2022-08-15 ENCOUNTER — APPOINTMENT (OUTPATIENT)
Dept: RADIOLOGY | Facility: HOSPITAL | Age: 67
End: 2022-08-15

## 2022-08-15 ENCOUNTER — RESULT REVIEW (OUTPATIENT)
Age: 67
End: 2022-08-15

## 2022-08-15 ENCOUNTER — APPOINTMENT (OUTPATIENT)
Dept: MAMMOGRAPHY | Facility: HOSPITAL | Age: 67
End: 2022-08-15

## 2022-08-15 DIAGNOSIS — Z00.8 ENCOUNTER FOR OTHER GENERAL EXAMINATION: ICD-10-CM

## 2022-08-15 DIAGNOSIS — Z98.84 BARIATRIC SURGERY STATUS: Chronic | ICD-10-CM

## 2022-08-15 PROCEDURE — 77063 BREAST TOMOSYNTHESIS BI: CPT | Mod: 26

## 2022-08-15 PROCEDURE — 77063 BREAST TOMOSYNTHESIS BI: CPT

## 2022-08-15 PROCEDURE — 77067 SCR MAMMO BI INCL CAD: CPT | Mod: 26

## 2022-08-15 PROCEDURE — 77067 SCR MAMMO BI INCL CAD: CPT

## 2022-08-19 ENCOUNTER — APPOINTMENT (OUTPATIENT)
Dept: HEPATOLOGY | Facility: CLINIC | Age: 67
End: 2022-08-19

## 2022-08-19 VITALS
SYSTOLIC BLOOD PRESSURE: 112 MMHG | OXYGEN SATURATION: 96 % | HEART RATE: 82 BPM | BODY MASS INDEX: 25.18 KG/M2 | WEIGHT: 170 LBS | DIASTOLIC BLOOD PRESSURE: 72 MMHG | HEIGHT: 69 IN

## 2022-08-19 VITALS
DIASTOLIC BLOOD PRESSURE: 66 MMHG | HEART RATE: 64 BPM | HEIGHT: 69 IN | SYSTOLIC BLOOD PRESSURE: 116 MMHG | OXYGEN SATURATION: 98 %

## 2022-08-19 LAB
ALBUMIN SERPL ELPH-MCNC: 4.4 G/DL
ALP BLD-CCNC: 116 U/L
ALT SERPL-CCNC: 15 U/L
ANION GAP SERPL CALC-SCNC: 12 MMOL/L
AST SERPL-CCNC: 24 U/L
BASOPHILS # BLD AUTO: 0.03 K/UL
BASOPHILS NFR BLD AUTO: 0.5 %
BILIRUB SERPL-MCNC: 0.4 MG/DL
BUN SERPL-MCNC: 15 MG/DL
CALCIUM SERPL-MCNC: 9.8 MG/DL
CHLORIDE SERPL-SCNC: 105 MMOL/L
CO2 SERPL-SCNC: 25 MMOL/L
CREAT SERPL-MCNC: 0.62 MG/DL
DEPRECATED KAPPA LC FREE/LAMBDA SER: 1.23 RATIO
EGFR: 98 ML/MIN/1.73M2
EOSINOPHIL # BLD AUTO: 0.16 K/UL
EOSINOPHIL NFR BLD AUTO: 2.9 %
GLUCOSE SERPL-MCNC: 84 MG/DL
HCT VFR BLD CALC: 39.4 %
HGB BLD-MCNC: 12.2 G/DL
IGA SER QL IEP: 134 MG/DL
IGG SER QL IEP: 809 MG/DL
IGM SER QL IEP: 148 MG/DL
IMM GRANULOCYTES NFR BLD AUTO: 0 %
KAPPA LC CSF-MCNC: 1.29 MG/DL
KAPPA LC SERPL-MCNC: 1.59 MG/DL
LYMPHOCYTES # BLD AUTO: 1.63 K/UL
LYMPHOCYTES NFR BLD AUTO: 29.7 %
MAN DIFF?: NORMAL
MCHC RBC-ENTMCNC: 27.5 PG
MCHC RBC-ENTMCNC: 31 GM/DL
MCV RBC AUTO: 88.9 FL
MONOCYTES # BLD AUTO: 0.63 K/UL
MONOCYTES NFR BLD AUTO: 11.5 %
NEUTROPHILS # BLD AUTO: 3.04 K/UL
NEUTROPHILS NFR BLD AUTO: 55.4 %
PLATELET # BLD AUTO: 172 K/UL
POTASSIUM SERPL-SCNC: 4.1 MMOL/L
PROT SERPL-MCNC: 6.7 G/DL
RBC # BLD: 4.43 M/UL
RBC # FLD: 13.9 %
SODIUM SERPL-SCNC: 143 MMOL/L
WBC # FLD AUTO: 5.49 K/UL

## 2022-08-19 PROCEDURE — 36415 COLL VENOUS BLD VENIPUNCTURE: CPT

## 2022-08-19 PROCEDURE — 99214 OFFICE O/P EST MOD 30 MIN: CPT | Mod: 25

## 2022-08-19 RX ORDER — SULFAMETHOXAZOLE AND TRIMETHOPRIM 800; 160 MG/1; MG/1
800-160 TABLET ORAL
Qty: 10 | Refills: 0 | Status: DISCONTINUED | COMMUNITY
Start: 2022-02-17 | End: 2022-08-19

## 2022-08-19 NOTE — HISTORY OF PRESENT ILLNESS
[de-identified] : This patient was diagnosed with autoimmune hepatitis confirmed on liver biopsy in 2017.  She responded to prednisone and azathioprine therapy which was discontinued in 2019.  In 2020 the patient experienced a relapse of her autoimmune disease and attempts were made to control her with budesonide which was only partially effective.  Because of this the patient was switched to prednisone and azathioprine with good response to immune suppression and normalization of liver tests.  The patient normalized liver tests in November 2021 and now has had consistently normal biochemical liver tests and immunoglobulin levels on prednisone 10 azathioprine 50 mg daily.\par \par The patient is followed for skin cancer screening she has had melanoma in the past and squamous cell carcinomas.\par \par The patient has had bladder cancer which has been successfully resected.

## 2022-08-19 NOTE — ASSESSMENT
[FreeTextEntry1] : This patient with autoimmune hepatitis has responded well to immune suppression.  I will increase azathioprine to 100 mg daily reduce prednisone to 5 mg daily and monitor response.  If biochemical liver tests remain normal prednisone will continue to be tapered and discontinued while patient is maintained on azathioprine alone.\par \par The patient continues skin cancer screening eye examinations bone density assessment and will adhere to a low-sodium diet.  Prior evaluation suggested stage IV fibrosis of her liver and the patient has no evidence of encephalopathy or fluid overload.  Mri 1/2022 showed no suspicious liver lesion.

## 2022-08-19 NOTE — PHYSICAL EXAM
[Liver Size (___ Cm)] : Liver size [unfilled] cm [Liver Palpable ___ Finger Breadths Below Costal Margin] : Liver edge was palpable [unfilled] finger breadths below costal margin [Non-Tender] : non-tender [Irregular] : irregular [General Appearance - Alert] : alert [General Appearance - In No Acute Distress] : in no acute distress [General Appearance - Well Nourished] : well nourished [General Appearance - Well Developed] : well developed [General Appearance - Well-Appearing] : healthy appearing [Sclera] : the sclera and conjunctiva were normal [Outer Ear] : the ears and nose were normal in appearance [Examination Of The Oral Cavity] : the lips and gums were normal [Neck Appearance] : the appearance of the neck was normal [Neck Cervical Mass (___cm)] : no neck mass was observed [] : no respiratory distress [Respiration, Rhythm And Depth] : normal respiratory rhythm and effort [Exaggerated Use Of Accessory Muscles For Inspiration] : no accessory muscle use [Heart Rate And Rhythm] : heart rate was normal and rhythm regular [Edema] : there was no peripheral edema [Bowel Sounds] : normal bowel sounds [Abdomen Tenderness] : non-tender [Supraclavicular Lymph Nodes Enlarged Bilaterally] : supraclavicular [Nail Clubbing] : no clubbing  or cyanosis of the fingernails [Involuntary Movements] : no involuntary movements were seen [Skin Color & Pigmentation] : normal skin color and pigmentation [Skin Turgor] : normal skin turgor [No Focal Deficits] : no focal deficits [Oriented To Time, Place, And Person] : oriented to person, place, and time [Scleral Icterus] : No Scleral Icterus [Hepatojugular Reflux] : patient did not have a sustained hepatojugular reflux [Spider Angioma] : No spider angioma(s) were observed [Abdominal Bruit] : no abdominal bruit [Abdominal  Ascites] : no ascites [Ascites Fluid Wave] : no ascites fluid wave [Ascites Tense] : ascites is not tense [Ascites Shifting Dullness] : no shifting dullness of ascites [Splenomegaly] : no splenomegaly [Caput Medusae] : no caput medusae observed [Asterixis] : no asterixis observed [Jaundice] : No jaundice [Palmar Erythema] : no Palmar Erythema [FreeTextEntry1] : sun damage

## 2022-09-26 LAB
ALBUMIN SERPL ELPH-MCNC: 4.5 G/DL
ALP BLD-CCNC: 121 U/L
ALT SERPL-CCNC: 14 U/L
ANION GAP SERPL CALC-SCNC: 13 MMOL/L
AST SERPL-CCNC: 26 U/L
BASOPHILS # BLD AUTO: 0.02 K/UL
BASOPHILS NFR BLD AUTO: 0.5 %
BILIRUB SERPL-MCNC: 0.3 MG/DL
BUN SERPL-MCNC: 19 MG/DL
CALCIUM SERPL-MCNC: 9.7 MG/DL
CHLORIDE SERPL-SCNC: 111 MMOL/L
CO2 SERPL-SCNC: 28 MMOL/L
COVID-19 SPIKE DOMAIN ANTIBODY INTERPRETATION: POSITIVE
CREAT SERPL-MCNC: 0.53 MG/DL
DEPRECATED KAPPA LC FREE/LAMBDA SER: 1.08 RATIO
EGFR: 101 ML/MIN/1.73M2
EOSINOPHIL # BLD AUTO: 0.14 K/UL
EOSINOPHIL NFR BLD AUTO: 3.7 %
GLUCOSE SERPL-MCNC: 93 MG/DL
HCT VFR BLD CALC: 40.1 %
HGB BLD-MCNC: 12.6 G/DL
IGA SER QL IEP: 135 MG/DL
IGG SER QL IEP: 816 MG/DL
IGM SER QL IEP: 151 MG/DL
IMM GRANULOCYTES NFR BLD AUTO: 0 %
KAPPA LC CSF-MCNC: 1.52 MG/DL
KAPPA LC SERPL-MCNC: 1.64 MG/DL
LYMPHOCYTES # BLD AUTO: 1.56 K/UL
LYMPHOCYTES NFR BLD AUTO: 40.8 %
MAN DIFF?: NORMAL
MCHC RBC-ENTMCNC: 28.7 PG
MCHC RBC-ENTMCNC: 31.4 GM/DL
MCV RBC AUTO: 91.3 FL
MONOCYTES # BLD AUTO: 0.54 K/UL
MONOCYTES NFR BLD AUTO: 14.1 %
NEUTROPHILS # BLD AUTO: 1.56 K/UL
NEUTROPHILS NFR BLD AUTO: 40.9 %
PLATELET # BLD AUTO: 174 K/UL
POTASSIUM SERPL-SCNC: 4.7 MMOL/L
PROT SERPL-MCNC: 6.6 G/DL
RBC # BLD: 4.39 M/UL
RBC # FLD: 14.5 %
SARS-COV-2 AB SERPL IA-ACNC: >250 U/ML
SODIUM SERPL-SCNC: 152 MMOL/L
WBC # FLD AUTO: 3.82 K/UL

## 2022-10-11 LAB
ALBUMIN SERPL ELPH-MCNC: 4.3 G/DL
ALP BLD-CCNC: 127 U/L
ALT SERPL-CCNC: 12 U/L
ANION GAP SERPL CALC-SCNC: 11 MMOL/L
AST SERPL-CCNC: 23 U/L
BILIRUB SERPL-MCNC: 0.2 MG/DL
BUN SERPL-MCNC: 22 MG/DL
CALCIUM SERPL-MCNC: 9.4 MG/DL
CHLORIDE SERPL-SCNC: 104 MMOL/L
CO2 SERPL-SCNC: 24 MMOL/L
CREAT SERPL-MCNC: 0.56 MG/DL
EGFR: 100 ML/MIN/1.73M2
POTASSIUM SERPL-SCNC: 4.2 MMOL/L
PROT SERPL-MCNC: 6.5 G/DL
SODIUM SERPL-SCNC: 138 MMOL/L

## 2022-12-09 ENCOUNTER — OUTPATIENT (OUTPATIENT)
Dept: OUTPATIENT SERVICES | Facility: HOSPITAL | Age: 67
LOS: 1 days | End: 2022-12-09
Payer: MEDICARE

## 2022-12-09 ENCOUNTER — APPOINTMENT (OUTPATIENT)
Dept: UROLOGY | Facility: CLINIC | Age: 67
End: 2022-12-09

## 2022-12-09 VITALS — TEMPERATURE: 98 F | HEART RATE: 70 BPM | SYSTOLIC BLOOD PRESSURE: 125 MMHG | DIASTOLIC BLOOD PRESSURE: 83 MMHG

## 2022-12-09 DIAGNOSIS — Z98.84 BARIATRIC SURGERY STATUS: Chronic | ICD-10-CM

## 2022-12-09 DIAGNOSIS — R35.0 FREQUENCY OF MICTURITION: ICD-10-CM

## 2022-12-09 PROCEDURE — 52000 CYSTOURETHROSCOPY: CPT

## 2022-12-09 PROCEDURE — 88112 CYTOPATH CELL ENHANCE TECH: CPT | Mod: 26

## 2022-12-12 DIAGNOSIS — N32.89 OTHER SPECIFIED DISORDERS OF BLADDER: ICD-10-CM

## 2022-12-12 LAB — URINE CYTOLOGY: NORMAL

## 2022-12-15 ENCOUNTER — LABORATORY RESULT (OUTPATIENT)
Age: 67
End: 2022-12-15

## 2022-12-15 LAB
ALBUMIN SERPL ELPH-MCNC: 4.3 G/DL
ALP BLD-CCNC: 118 U/L
ALT SERPL-CCNC: 15 U/L
ANION GAP SERPL CALC-SCNC: 10 MMOL/L
AST SERPL-CCNC: 28 U/L
BILIRUB SERPL-MCNC: 0.2 MG/DL
BUN SERPL-MCNC: 11 MG/DL
CALCIUM SERPL-MCNC: 9.5 MG/DL
CHLORIDE SERPL-SCNC: 106 MMOL/L
CO2 SERPL-SCNC: 26 MMOL/L
CREAT SERPL-MCNC: 0.62 MG/DL
DEPRECATED KAPPA LC FREE/LAMBDA SER: 1.5 RATIO
EGFR: 97 ML/MIN/1.73M2
GGT SERPL-CCNC: 15 U/L
IGA SER QL IEP: 134 MG/DL
IGG SER QL IEP: 879 MG/DL
IGM SER QL IEP: 156 MG/DL
INR PPP: 0.95 RATIO
KAPPA LC CSF-MCNC: 1.71 MG/DL
KAPPA LC SERPL-MCNC: 2.57 MG/DL
POTASSIUM SERPL-SCNC: 4.1 MMOL/L
PROT SERPL-MCNC: 6.5 G/DL
PT BLD: 11.1 SEC
SODIUM SERPL-SCNC: 142 MMOL/L

## 2022-12-16 LAB
BASOPHILS # BLD AUTO: 0.01 K/UL
BASOPHILS NFR BLD AUTO: 0.4 %
EOSINOPHIL # BLD AUTO: 0.03 K/UL
EOSINOPHIL NFR BLD AUTO: 1.1 %
HCT VFR BLD CALC: 39.9 %
HGB BLD-MCNC: 12.8 G/DL
IMM GRANULOCYTES NFR BLD AUTO: 0 %
LYMPHOCYTES # BLD AUTO: 0.95 K/UL
LYMPHOCYTES NFR BLD AUTO: 34.9 %
MAN DIFF?: NORMAL
MCHC RBC-ENTMCNC: 28.9 PG
MCHC RBC-ENTMCNC: 32.1 GM/DL
MCV RBC AUTO: 90.1 FL
MITOCHONDRIA AB SER IF-ACNC: NORMAL
MONOCYTES # BLD AUTO: 0.33 K/UL
MONOCYTES NFR BLD AUTO: 12.1 %
NEUTROPHILS # BLD AUTO: 1.4 K/UL
NEUTROPHILS NFR BLD AUTO: 51.5 %
PLATELET # BLD AUTO: 148 K/UL
RBC # BLD: 4.43 M/UL
RBC # FLD: 14.7 %
SMOOTH MUSCLE AB SER QL IF: ABNORMAL
WBC # FLD AUTO: 2.72 K/UL

## 2022-12-22 ENCOUNTER — APPOINTMENT (OUTPATIENT)
Dept: HEPATOLOGY | Facility: CLINIC | Age: 67
End: 2022-12-22

## 2022-12-22 DIAGNOSIS — M81.0 AGE-RELATED OSTEOPOROSIS W/OUT CURRENT PATHOLOGICAL FRACTURE: ICD-10-CM

## 2022-12-22 PROCEDURE — 99214 OFFICE O/P EST MOD 30 MIN: CPT

## 2022-12-22 RX ORDER — PREDNISONE 5 MG/1
5 TABLET ORAL
Qty: 30 | Refills: 3 | Status: DISCONTINUED | COMMUNITY
Start: 2021-05-07 | End: 2022-12-22

## 2022-12-22 NOTE — HISTORY OF PRESENT ILLNESS
[de-identified] : This patient was diagnosed with autoimmune hepatitis confirmed on liver biopsy in 2017.  At that time she presented with jaundice and severely active hepatitis.  She responded to prednisone and azathioprine therapy which was discontinued in 2019.  In 2020 she experienced a relapse of her autoimmune hepatitis and an attempt to control her disease using budesonide was only partially effective.  The patient is known to have osteoporosis.  Because of the ineffective nature of the treatment the patient was again treated with prednisone and azathioprine with good response.  The patient is now had normal biochemical liver tests through 2022 and prednisone is being reduced currently at a dose of 2.5 mg daily.  The patient will discontinue prednisone at the end of January.\par \par In preparation for withdrawal of prednisone the patient's Imuran was increased to 100 mg a day in August 2022 as prednisone was being withdrawn.  On last blood draw cell counts including lymphocyte count neutrophil count and platelet count were lower than usual.  The patient reports having had a viral infection around the time of that blood draw.

## 2022-12-22 NOTE — ASSESSMENT
[FreeTextEntry1] : This patient has cirrhosis documented on MR elastography and has had autoimmune hepatitis initially responsive to immune suppression and now after relapse is again under control.  I will withdraw prednisone and maintain the patient on azathioprine 100 mg/day.  The patient will have follow-up CBC to assure safety of this azathioprine dose.  The patient has had skin cancers and is advised to have close dermatology follow-up as one of her skin cancers was melanoma.  She has also had bladder cancer which has been successfully resected.\par \par The patient has well compensated cirrhosis endoscopy in December 2018 showed no esophageal varices the patient has no hepatic encephalopathy no ascites or peripheral edema.  The patient will have laboratory testing following withdrawal of her prednisone with laboratory tests drawn in mid February and will seek follow-up with hepatology in April.

## 2022-12-28 ENCOUNTER — NON-APPOINTMENT (OUTPATIENT)
Age: 67
End: 2022-12-28

## 2022-12-28 LAB
BASOPHILS # BLD AUTO: 0.01 K/UL
BASOPHILS NFR BLD AUTO: 0.2 %
EOSINOPHIL # BLD AUTO: 0.07 K/UL
EOSINOPHIL NFR BLD AUTO: 1.5 %
HCT VFR BLD CALC: 37.3 %
HGB BLD-MCNC: 12.1 G/DL
IMM GRANULOCYTES NFR BLD AUTO: 0.2 %
LYMPHOCYTES # BLD AUTO: 0.99 K/UL
LYMPHOCYTES NFR BLD AUTO: 20.5 %
MAN DIFF?: NORMAL
MCHC RBC-ENTMCNC: 29.2 PG
MCHC RBC-ENTMCNC: 32.4 GM/DL
MCV RBC AUTO: 90.1 FL
MONOCYTES # BLD AUTO: 0.51 K/UL
MONOCYTES NFR BLD AUTO: 10.6 %
NEUTROPHILS # BLD AUTO: 3.23 K/UL
NEUTROPHILS NFR BLD AUTO: 67 %
PLATELET # BLD AUTO: 195 K/UL
RBC # BLD: 4.14 M/UL
RBC # FLD: 14.3 %
WBC # FLD AUTO: 4.82 K/UL

## 2023-01-03 LAB
AFP-TM SERPL-MCNC: 4.3 NG/ML
ALBUMIN SERPL ELPH-MCNC: 4.1 G/DL
ALP BLD-CCNC: 129 U/L
ALT SERPL-CCNC: 11 U/L
ANION GAP SERPL CALC-SCNC: 10 MMOL/L
AST SERPL-CCNC: 18 U/L
BASOPHILS # BLD AUTO: 0.02 K/UL
BASOPHILS NFR BLD AUTO: 0.5 %
BILIRUB SERPL-MCNC: 0.3 MG/DL
BUN SERPL-MCNC: 18 MG/DL
CALCIUM SERPL-MCNC: 9.3 MG/DL
CHLORIDE SERPL-SCNC: 104 MMOL/L
CO2 SERPL-SCNC: 26 MMOL/L
CREAT SERPL-MCNC: 0.62 MG/DL
EGFR: 97 ML/MIN/1.73M2
EOSINOPHIL # BLD AUTO: 0.07 K/UL
EOSINOPHIL NFR BLD AUTO: 1.6 %
HCT VFR BLD CALC: 35.8 %
HGB BLD-MCNC: 11.8 G/DL
IMM GRANULOCYTES NFR BLD AUTO: 0.2 %
INR PPP: 0.99 RATIO
LYMPHOCYTES # BLD AUTO: 0.98 K/UL
LYMPHOCYTES NFR BLD AUTO: 22.4 %
MAN DIFF?: NORMAL
MCHC RBC-ENTMCNC: 29.7 PG
MCHC RBC-ENTMCNC: 33 GM/DL
MCV RBC AUTO: 90.2 FL
MONOCYTES # BLD AUTO: 0.47 K/UL
MONOCYTES NFR BLD AUTO: 10.8 %
NEUTROPHILS # BLD AUTO: 2.82 K/UL
NEUTROPHILS NFR BLD AUTO: 64.5 %
PLATELET # BLD AUTO: 206 K/UL
POTASSIUM SERPL-SCNC: 4.1 MMOL/L
PROT SERPL-MCNC: 6.3 G/DL
PT BLD: 11.6 SEC
RBC # BLD: 3.97 M/UL
RBC # FLD: 14.3 %
SODIUM SERPL-SCNC: 140 MMOL/L
WBC # FLD AUTO: 4.37 K/UL

## 2023-01-06 LAB
TPMT ENZYME INTERPRETATION: NORMAL
TPMT ENZYME METHODOLOGY: NORMAL
TPMT ENZYME: 16.6

## 2023-01-09 LAB
6-MMPN: <182
6-TGN: 269

## 2023-01-30 ENCOUNTER — NON-APPOINTMENT (OUTPATIENT)
Age: 68
End: 2023-01-30

## 2023-02-13 ENCOUNTER — NON-APPOINTMENT (OUTPATIENT)
Age: 68
End: 2023-02-13

## 2023-02-13 LAB
ALBUMIN SERPL ELPH-MCNC: 4.4 G/DL
ALP BLD-CCNC: 117 U/L
ALT SERPL-CCNC: 12 U/L
ANION GAP SERPL CALC-SCNC: 11 MMOL/L
AST SERPL-CCNC: 22 U/L
BASOPHILS # BLD AUTO: 0.01 K/UL
BASOPHILS NFR BLD AUTO: 0.3 %
BILIRUB SERPL-MCNC: 0.4 MG/DL
BUN SERPL-MCNC: 13 MG/DL
CALCIUM SERPL-MCNC: 9.6 MG/DL
CHLORIDE SERPL-SCNC: 107 MMOL/L
CO2 SERPL-SCNC: 25 MMOL/L
CREAT SERPL-MCNC: 0.59 MG/DL
EGFR: 98 ML/MIN/1.73M2
EOSINOPHIL # BLD AUTO: 0.07 K/UL
EOSINOPHIL NFR BLD AUTO: 2 %
GLUCOSE SERPL-MCNC: 86 MG/DL
HCT VFR BLD CALC: 37.1 %
HGB BLD-MCNC: 11.7 G/DL
IMM GRANULOCYTES NFR BLD AUTO: 0 %
LYMPHOCYTES # BLD AUTO: 0.83 K/UL
LYMPHOCYTES NFR BLD AUTO: 23.9 %
MAN DIFF?: NORMAL
MCHC RBC-ENTMCNC: 29.3 PG
MCHC RBC-ENTMCNC: 31.5 GM/DL
MCV RBC AUTO: 92.8 FL
MONOCYTES # BLD AUTO: 0.38 K/UL
MONOCYTES NFR BLD AUTO: 11 %
NEUTROPHILS # BLD AUTO: 2.18 K/UL
NEUTROPHILS NFR BLD AUTO: 62.8 %
PLATELET # BLD AUTO: 165 K/UL
POTASSIUM SERPL-SCNC: 4 MMOL/L
PROT SERPL-MCNC: 6.4 G/DL
RBC # BLD: 4 M/UL
RBC # FLD: 14.5 %
SODIUM SERPL-SCNC: 143 MMOL/L
WBC # FLD AUTO: 3.47 K/UL

## 2023-03-13 LAB
ALBUMIN SERPL ELPH-MCNC: 4.2 G/DL
ALP BLD-CCNC: 129 U/L
ALT SERPL-CCNC: 8 U/L
ANION GAP SERPL CALC-SCNC: 10 MMOL/L
AST SERPL-CCNC: 19 U/L
BASOPHILS # BLD AUTO: 0.03 K/UL
BASOPHILS NFR BLD AUTO: 0.8 %
BILIRUB SERPL-MCNC: 0.2 MG/DL
BUN SERPL-MCNC: 12 MG/DL
CALCIUM SERPL-MCNC: 9.8 MG/DL
CHLORIDE SERPL-SCNC: 105 MMOL/L
CO2 SERPL-SCNC: 26 MMOL/L
CREAT SERPL-MCNC: 0.57 MG/DL
EGFR: 99 ML/MIN/1.73M2
EOSINOPHIL # BLD AUTO: 0.06 K/UL
EOSINOPHIL NFR BLD AUTO: 1.5 %
GGT SERPL-CCNC: 11 U/L
HCT VFR BLD CALC: 37.4 %
HGB BLD-MCNC: 11.8 G/DL
IMM GRANULOCYTES NFR BLD AUTO: 0 %
LYMPHOCYTES # BLD AUTO: 0.64 K/UL
LYMPHOCYTES NFR BLD AUTO: 16.2 %
MAN DIFF?: NORMAL
MCHC RBC-ENTMCNC: 29.6 PG
MCHC RBC-ENTMCNC: 31.6 GM/DL
MCV RBC AUTO: 93.7 FL
MONOCYTES # BLD AUTO: 0.38 K/UL
MONOCYTES NFR BLD AUTO: 9.6 %
NEUTROPHILS # BLD AUTO: 2.83 K/UL
NEUTROPHILS NFR BLD AUTO: 71.9 %
PLATELET # BLD AUTO: 188 K/UL
POTASSIUM SERPL-SCNC: 4.2 MMOL/L
PROT SERPL-MCNC: 6.4 G/DL
RBC # BLD: 3.99 M/UL
RBC # FLD: 14.6 %
SODIUM SERPL-SCNC: 140 MMOL/L
WBC # FLD AUTO: 3.94 K/UL

## 2023-04-17 LAB
ALBUMIN SERPL ELPH-MCNC: 4.3 G/DL
ALP BLD-CCNC: 133 U/L
ALT SERPL-CCNC: 6 U/L
ANION GAP SERPL CALC-SCNC: 12 MMOL/L
AST SERPL-CCNC: 19 U/L
BILIRUB SERPL-MCNC: 0.4 MG/DL
BUN SERPL-MCNC: 14 MG/DL
CALCIUM SERPL-MCNC: 9.5 MG/DL
CHLORIDE SERPL-SCNC: 106 MMOL/L
CO2 SERPL-SCNC: 24 MMOL/L
CREAT SERPL-MCNC: 0.53 MG/DL
DEPRECATED KAPPA LC FREE/LAMBDA SER: 1.15 RATIO
EGFR: 101 ML/MIN/1.73M2
GGT SERPL-CCNC: 14 U/L
IGA SER QL IEP: 124 MG/DL
IGG SER QL IEP: 810 MG/DL
IGM SER QL IEP: 130 MG/DL
KAPPA LC CSF-MCNC: 1.17 MG/DL
KAPPA LC SERPL-MCNC: 1.34 MG/DL
POTASSIUM SERPL-SCNC: 4.5 MMOL/L
PROT SERPL-MCNC: 6.3 G/DL
SODIUM SERPL-SCNC: 142 MMOL/L

## 2023-05-11 ENCOUNTER — APPOINTMENT (OUTPATIENT)
Dept: HEPATOLOGY | Facility: CLINIC | Age: 68
End: 2023-05-11
Payer: MEDICARE

## 2023-05-11 VITALS
HEART RATE: 72 BPM | BODY MASS INDEX: 25.92 KG/M2 | DIASTOLIC BLOOD PRESSURE: 74 MMHG | WEIGHT: 175 LBS | TEMPERATURE: 98.1 F | OXYGEN SATURATION: 100 % | HEIGHT: 69 IN | SYSTOLIC BLOOD PRESSURE: 109 MMHG | RESPIRATION RATE: 15 BRPM

## 2023-05-11 DIAGNOSIS — Z00.00 ENCOUNTER FOR GENERAL ADULT MEDICAL EXAMINATION W/OUT ABNORMAL FINDINGS: ICD-10-CM

## 2023-05-11 LAB
ALBUMIN SERPL ELPH-MCNC: 4.3 G/DL
ALP BLD-CCNC: 125 U/L
ALT SERPL-CCNC: 11 U/L
ANION GAP SERPL CALC-SCNC: 14 MMOL/L
AST SERPL-CCNC: 24 U/L
BASOPHILS # BLD AUTO: 0.01 K/UL
BASOPHILS NFR BLD AUTO: 0.2 %
BILIRUB SERPL-MCNC: 0.3 MG/DL
BUN SERPL-MCNC: 17 MG/DL
CALCIUM SERPL-MCNC: 10 MG/DL
CHLORIDE SERPL-SCNC: 105 MMOL/L
CO2 SERPL-SCNC: 23 MMOL/L
CREAT SERPL-MCNC: 0.57 MG/DL
EGFR: 99 ML/MIN/1.73M2
EOSINOPHIL # BLD AUTO: 0.08 K/UL
EOSINOPHIL NFR BLD AUTO: 1.7 %
GGT SERPL-CCNC: 11 U/L
GLUCOSE SERPL-MCNC: 87 MG/DL
HCT VFR BLD CALC: 37.8 %
HGB BLD-MCNC: 11.9 G/DL
IMM GRANULOCYTES NFR BLD AUTO: 0.2 %
INR PPP: 0.99 RATIO
LYMPHOCYTES # BLD AUTO: 0.94 K/UL
LYMPHOCYTES NFR BLD AUTO: 20 %
MAN DIFF?: NORMAL
MCHC RBC-ENTMCNC: 29.4 PG
MCHC RBC-ENTMCNC: 31.5 GM/DL
MCV RBC AUTO: 93.3 FL
MONOCYTES # BLD AUTO: 0.53 K/UL
MONOCYTES NFR BLD AUTO: 11.3 %
NEUTROPHILS # BLD AUTO: 3.13 K/UL
NEUTROPHILS NFR BLD AUTO: 66.6 %
PLATELET # BLD AUTO: 175 K/UL
POTASSIUM SERPL-SCNC: 4.3 MMOL/L
PROT SERPL-MCNC: 6.5 G/DL
PT BLD: 11.5 SEC
RBC # BLD: 4.05 M/UL
RBC # FLD: 15.3 %
SODIUM SERPL-SCNC: 142 MMOL/L
WBC # FLD AUTO: 4.7 K/UL

## 2023-05-11 PROCEDURE — 90636 HEP A/HEP B VACC ADULT IM: CPT | Mod: GY

## 2023-05-11 PROCEDURE — 90471 IMMUNIZATION ADMIN: CPT

## 2023-05-11 PROCEDURE — 99214 OFFICE O/P EST MOD 30 MIN: CPT

## 2023-05-19 ENCOUNTER — APPOINTMENT (OUTPATIENT)
Dept: UROLOGY | Facility: CLINIC | Age: 68
End: 2023-05-19

## 2023-05-20 ENCOUNTER — APPOINTMENT (OUTPATIENT)
Age: 68
End: 2023-05-20
Payer: MEDICARE

## 2023-05-20 PROCEDURE — 93975 VASCULAR STUDY: CPT

## 2023-05-22 ENCOUNTER — LABORATORY RESULT (OUTPATIENT)
Age: 68
End: 2023-05-22

## 2023-05-22 PROBLEM — Z00.00 ENCOUNTER FOR PREVENTIVE HEALTH EXAMINATION: Status: ACTIVE | Noted: 2017-07-25

## 2023-05-22 NOTE — HISTORY OF PRESENT ILLNESS
[de-identified] : Ms. Torres is a very pleasant 68 yo F with a history of obesity (s/p Lopez-en-Y gastric bypass), osteoporosis, history of a low grade papillary urothelial cancer (found incidentally on surveillance MRI abdomen/pelvis on 1/8/22, s/p TURBT 2/21/22, and undergoing q6 month cystoscopy surveillance with Urology), and autoimmune hepatitis (diagnosed in 2017 after she presented with a severe acute hepatocellular injury with associated jaundice) with associated compensated cirrhosis. She was previously treated with prednisone and azathioprine and achieved biochemical remission, with subsequent drug withdrawal in 2019. She had an AIH relapse in 2020 that was unable to be controlled with budesonide alone and necessitated resumed prednisone and azathioprine, now in biochemical remission again.\par \par She was previously followed by hepatologist Dr. Bill Lion (with last visit on 12/22/22) and is being seen today for routine follow-up and to transition her hepatology care after he retired.\par \par Her current immunosuppression is: prednisone 2.5 mg po daily (since 1/2023) and azathioprine 100 mg po daily (increased from 50 mg po daily in 8/2022 to enable gradual prednisone withdrawal).\par \par She last underwent MR elastography on 9/11/20 that showed cirrhosis including based on hepatic stiffness but without any focal hepatic lesion or radiologic findings to suggest portal hypertension. She underwent MRI abdomen/pelvis on 1/8/22 that led to incidental diagnosis of her urothelial cancer but with otherwise stable liver findings. She has not had any liver imaging since then (>1 year).\par \par She reports feeling well. No history of confusion or mental fogginess, overt GI bleeding, abdominal distension with ascites, lower extremity swelling, or dyspnea.\par \par She is a former smoker. She does not drink any alcohol. No recreational drug use. 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 She has an adult son and a 1.5-year-old granddaughter.\par \par She has no known family history of liver disease. Her mother had uterine cancer. Her sister has DM.  [FreeTextEntry1] : \par \par

## 2023-05-22 NOTE — ASSESSMENT
[FreeTextEntry1] : # Autoimmune hepatitis with associated compensated cirrhosis:\par - Diagnosed in 2017 with F3-4 fibrosis on biopsy at that time, with more recent MR elastography (9/11/20) with liver stiffness in the range of cirrhosis as well as cirrhotic liver morphology seen on that MRI as well as more recent MRI (1/8/22).\par - No history of overt hepatic decompensations apart from the initial jaundice when first diagnosed and that resolved (i.e., no history of variceal hemorrhage, ascites, or hepatic encephalopathy).\par - No upper GI varices on last EGD with GI Dr. Daley in 12/2018.\par - FibroScan ordered today to be done with her next visit in 6 months. If median liver stiffness <20 kPA, will defer repeat surveillance EGD based on Baveno consensus criteria given her normal platelet count >150k. Otherwise, if liver stiffness >=20 kPA, then she will need repeat EGD with Dr. Daley for surveillance.\par - Most recent labs (5/8/23) with normal liver chemistries. Decrease prednisone from 2.5 mg po daily (1/2023- ) to 2.5 mg po every other day x3 months. If labs remain stable after 3 months, will consider stopping prednisone.\par - Continue azathioprine 100 mg po daily.\par - Repeat labs in 2 weeks (ordered today), including thiopurine metabolite levels. \par - We have discussed the importance of lifelong surveillance for HCC every 6 months. No suspicious hepatic lesion seen on last MRI (1/8/22) but overdue for imaging, discussed today, with abdominal sonogram ordered to be done as soon as possible. Most recent AFP 4.3 ng/mL (12/31/22), with repeat ordered with next labs.  \par - We discussed that given her well-compensated disease with low MELD-Na and no overt complications of portal hypertension, she does not have any indication for liver transplantation evaluation at this time. We discussed the potential risk of progressing to decompensated cirrhosis (approximately 5%/year risk if she remains abstinent from alcohol) and potential risks of development of ascites, SBP, varices / variceal hemorrhage, hepatic encephalopathy, PVT, and HCC and alert signs for her to seek medical attention.\par \par # History of low grade papillary urothelial cancer (s/p TURBT 2/21/22): She is continuing cystocopic surveillance with Urology.\par \par # Health maintenance: \par - HAV and HBV non-immune. Starting Twinrix vaccination series today.\par - Completed annual influenza vaccine and received bivalent COVID-19 booster.\par - DEXA (8/2021) with osteoporosis. Repeat due 8/2023. She continues to takes daily Vitamin D and eats foods high in calcium and maintains an active lifestyle, but likely needs bisphosphonate therapy.\par - Ms. PARIKH was counseled to: abstain from alcohol and all illicit drugs; avoid use of herbal and dietary supplements due to potential hepatotoxicity; limit use of acetaminophen to <2 grams per day; avoid use of nonsteroidal antiinflammatory drugs (NSAIDs) as these can lead to diuretic resistance and precipitate renal dysfunction in patients with advanced liver disease; avoid eating any unpasteurized dairy products; and avoid eating raw/steamed oysters or other shellfish to avoid risk of Vibrio infection.\par \par Next follow up: 6 months with same day FibroScan

## 2023-05-25 ENCOUNTER — NON-APPOINTMENT (OUTPATIENT)
Age: 68
End: 2023-05-25

## 2023-05-30 LAB
6-MMPN: <204
6-TGN: 375

## 2023-06-14 RX ORDER — AZATHIOPRINE 50 1/1
50 TABLET ORAL
Qty: 180 | Refills: 3 | Status: ACTIVE | COMMUNITY
Start: 2021-05-07 | End: 1900-01-01

## 2023-06-19 ENCOUNTER — APPOINTMENT (OUTPATIENT)
Dept: HEPATOLOGY | Facility: CLINIC | Age: 68
End: 2023-06-19
Payer: MEDICARE

## 2023-06-19 PROCEDURE — 90471 IMMUNIZATION ADMIN: CPT

## 2023-06-19 PROCEDURE — 90636 HEP A/HEP B VACC ADULT IM: CPT | Mod: GY

## 2023-07-12 ENCOUNTER — NON-APPOINTMENT (OUTPATIENT)
Age: 68
End: 2023-07-12

## 2023-07-12 LAB
ALBUMIN SERPL ELPH-MCNC: 4.2 G/DL
ALP BLD-CCNC: 118 U/L
ALT SERPL-CCNC: 9 U/L
ANION GAP SERPL CALC-SCNC: 10 MMOL/L
AST SERPL-CCNC: 21 U/L
BILIRUB SERPL-MCNC: 0.2 MG/DL
BUN SERPL-MCNC: 26 MG/DL
CALCIUM SERPL-MCNC: 9.7 MG/DL
CHLORIDE SERPL-SCNC: 102 MMOL/L
CO2 SERPL-SCNC: 26 MMOL/L
CREAT SERPL-MCNC: 0.6 MG/DL
EGFR: 98 ML/MIN/1.73M2
GGT SERPL-CCNC: 12 U/L
GLUCOSE SERPL-MCNC: 95 MG/DL
INR PPP: 0.93 RATIO
POTASSIUM SERPL-SCNC: 4.4 MMOL/L
PROT SERPL-MCNC: 6.4 G/DL
PT BLD: 10.9 SEC
SODIUM SERPL-SCNC: 138 MMOL/L

## 2023-08-04 ENCOUNTER — APPOINTMENT (OUTPATIENT)
Dept: UROLOGY | Facility: CLINIC | Age: 68
End: 2023-08-04
Payer: MEDICARE

## 2023-08-04 ENCOUNTER — OUTPATIENT (OUTPATIENT)
Dept: OUTPATIENT SERVICES | Facility: HOSPITAL | Age: 68
LOS: 1 days | End: 2023-08-04
Payer: MEDICARE

## 2023-08-04 DIAGNOSIS — R35.0 FREQUENCY OF MICTURITION: ICD-10-CM

## 2023-08-04 DIAGNOSIS — Z98.84 BARIATRIC SURGERY STATUS: Chronic | ICD-10-CM

## 2023-08-04 DIAGNOSIS — N32.89 OTHER SPECIFIED DISORDERS OF BLADDER: ICD-10-CM

## 2023-08-04 PROCEDURE — 52000 CYSTOURETHROSCOPY: CPT

## 2023-08-07 DIAGNOSIS — C67.9 MALIGNANT NEOPLASM OF BLADDER, UNSPECIFIED: ICD-10-CM

## 2023-08-07 DIAGNOSIS — N32.89 OTHER SPECIFIED DISORDERS OF BLADDER: ICD-10-CM

## 2023-08-08 LAB — URINE CYTOLOGY: NORMAL

## 2023-08-15 LAB
ALBUMIN SERPL ELPH-MCNC: 4 G/DL
ALP BLD-CCNC: 123 U/L
ALT SERPL-CCNC: 8 U/L
ANION GAP SERPL CALC-SCNC: 13 MMOL/L
AST SERPL-CCNC: 17 U/L
BILIRUB SERPL-MCNC: <0.2 MG/DL
BUN SERPL-MCNC: 23 MG/DL
CALCIUM SERPL-MCNC: 9.2 MG/DL
CHLORIDE SERPL-SCNC: 104 MMOL/L
CO2 SERPL-SCNC: 22 MMOL/L
CREAT SERPL-MCNC: 0.62 MG/DL
EGFR: 97 ML/MIN/1.73M2
GGT SERPL-CCNC: 13 U/L
GLUCOSE SERPL-MCNC: 87 MG/DL
POTASSIUM SERPL-SCNC: 4.5 MMOL/L
PROT SERPL-MCNC: 6.5 G/DL
SODIUM SERPL-SCNC: 139 MMOL/L

## 2023-08-22 ENCOUNTER — APPOINTMENT (OUTPATIENT)
Dept: FAMILY MEDICINE | Facility: CLINIC | Age: 68
End: 2023-08-22
Payer: MEDICARE

## 2023-08-22 VITALS
DIASTOLIC BLOOD PRESSURE: 68 MMHG | WEIGHT: 18 LBS | HEIGHT: 69 IN | HEART RATE: 77 BPM | OXYGEN SATURATION: 99 % | BODY MASS INDEX: 2.66 KG/M2 | SYSTOLIC BLOOD PRESSURE: 105 MMHG | TEMPERATURE: 97.5 F | RESPIRATION RATE: 15 BRPM

## 2023-08-22 DIAGNOSIS — R94.01 ABNORMAL ELECTROENCEPHALOGRAM [EEG]: ICD-10-CM

## 2023-08-22 PROCEDURE — 99214 OFFICE O/P EST MOD 30 MIN: CPT

## 2023-08-22 NOTE — ASSESSMENT
[Modify medications prior to procedure] : Modify medications prior to procedure [High Risk Surgery - Intraperitoneal, Intrathoracic or Supringuinal Vascular Procedures] : High Risk Surgery - Intraperitoneal, Intrathoracic or Supringuinal Vascular Procedures - No (0) [Ischemic Heart Disease] : Ischemic Heart Disease - No (0) [Congestive Heart Failure] : Congestive Heart Failure - No (0) [Prior Cerebrovascular Accident or TIA] : Prior Cerebrovascular Accident or TIA - No (0) [Creatinine >= 2mg/dL (1 Point)] : Creatinine >= 2mg/dL - No (0) [Insulin-dependent Diabetic (1 Point)] : Insulin-dependent Diabetic - No (0) [0] : 0 , RCRI Class: I, Risk of Post-Op Cardiac Complications: 3.9%, 95% CI for Risk Estimate: 2.8% - 5.4%

## 2023-08-23 NOTE — HISTORY OF PRESENT ILLNESS
[No Pertinent Cardiac History] : no history of aortic stenosis, atrial fibrillation, coronary artery disease, recent myocardial infarction, or implantable device/pacemaker [No Pertinent Pulmonary History] : no history of asthma, COPD, sleep apnea, or smoking [No Adverse Anesthesia Reaction] : no adverse anesthesia reaction in self or family member [(Patient denies any chest pain, claudication, dyspnea on exertion, orthopnea, palpitations or syncope)] : Patient denies any chest pain, claudication, dyspnea on exertion, orthopnea, palpitations or syncope [Chronic Anticoagulation] : no chronic anticoagulation [Chronic Kidney Disease] : no chronic kidney disease [Diabetes] : no diabetes [FreeTextEntry1] : Resection of bladder masses  [FreeTextEntry2] : 09/05/23 [FreeTextEntry3] : Dr Morales [FreeTextEntry4] : Recurrent bladder ca. Completely asymptotic. Initial bladder mass was an incidental finding when she had MRI performed for surveillance of her autoimmune hepatitis. Overall feels well.  Denies any pertinent cardiac or pulmonary history.

## 2023-08-23 NOTE — RESULTS/DATA
[] : results reviewed [de-identified] : 8/14-acceptable [de-identified] : 8/14-acceptable [de-identified] : 2/22-normal sinus rhythm with no acute ST or T wave findings

## 2023-08-23 NOTE — PLAN
[FreeTextEntry1] : Low risk patient being cleared for low risk procedure.  No acute medical contraindications noted, thus it is acceptable risk for patient to have surgery performed. Reviewed recent blood work. No cardiac history of hypertension-EKG in February/22 was completely normal-normal sinus rhythm with no acute ST or T wave findings

## 2023-08-24 ENCOUNTER — OUTPATIENT (OUTPATIENT)
Dept: OUTPATIENT SERVICES | Facility: HOSPITAL | Age: 68
LOS: 1 days | End: 2023-08-24
Payer: MEDICARE

## 2023-08-24 VITALS
WEIGHT: 177.03 LBS | OXYGEN SATURATION: 98 % | HEART RATE: 60 BPM | DIASTOLIC BLOOD PRESSURE: 79 MMHG | SYSTOLIC BLOOD PRESSURE: 120 MMHG | HEIGHT: 68 IN | RESPIRATION RATE: 15 BRPM | TEMPERATURE: 98 F

## 2023-08-24 DIAGNOSIS — Z98.890 OTHER SPECIFIED POSTPROCEDURAL STATES: Chronic | ICD-10-CM

## 2023-08-24 DIAGNOSIS — N32.89 OTHER SPECIFIED DISORDERS OF BLADDER: ICD-10-CM

## 2023-08-24 DIAGNOSIS — K75.4 AUTOIMMUNE HEPATITIS: ICD-10-CM

## 2023-08-24 DIAGNOSIS — Z98.84 BARIATRIC SURGERY STATUS: Chronic | ICD-10-CM

## 2023-08-24 LAB
APTT BLD: 31.3 SEC — SIGNIFICANT CHANGE UP (ref 24.5–35.6)
HCT VFR BLD CALC: 35.6 % — SIGNIFICANT CHANGE UP (ref 34.5–45)
HGB BLD-MCNC: 11.5 G/DL — SIGNIFICANT CHANGE UP (ref 11.5–15.5)
INR BLD: 0.85 RATIO — SIGNIFICANT CHANGE UP (ref 0.85–1.18)
MCHC RBC-ENTMCNC: 28.9 PG — SIGNIFICANT CHANGE UP (ref 27–34)
MCHC RBC-ENTMCNC: 32.3 GM/DL — SIGNIFICANT CHANGE UP (ref 32–36)
MCV RBC AUTO: 89.4 FL — SIGNIFICANT CHANGE UP (ref 80–100)
NRBC # BLD: 0 /100 WBCS — SIGNIFICANT CHANGE UP (ref 0–0)
NRBC # FLD: 0 K/UL — SIGNIFICANT CHANGE UP (ref 0–0)
PLATELET # BLD AUTO: 165 K/UL — SIGNIFICANT CHANGE UP (ref 150–400)
PROTHROM AB SERPL-ACNC: 9.6 SEC — SIGNIFICANT CHANGE UP (ref 9.5–13)
RBC # BLD: 3.98 M/UL — SIGNIFICANT CHANGE UP (ref 3.8–5.2)
RBC # FLD: 14.2 % — SIGNIFICANT CHANGE UP (ref 10.3–14.5)
WBC # BLD: 2.98 K/UL — LOW (ref 3.8–10.5)
WBC # FLD AUTO: 2.98 K/UL — LOW (ref 3.8–10.5)

## 2023-08-24 PROCEDURE — 93010 ELECTROCARDIOGRAM REPORT: CPT

## 2023-08-24 RX ORDER — PREDNISOLONE 5 MG
1 TABLET ORAL
Qty: 0 | Refills: 0 | DISCHARGE

## 2023-08-24 RX ORDER — AZATHIOPRINE 100 MG/1
1 TABLET ORAL
Qty: 0 | Refills: 0 | DISCHARGE

## 2023-08-24 NOTE — H&P PST ADULT - HISTORY OF PRESENT ILLNESS
67 year old female, with PMH of Autoimmune hepatitis, with pre op dx of other specified disorders of bladder, presents to PST, for pre op evaluation prior  scheduled surgery- for cystoscopy, transurethral resection of bladder tumor, Instructed to continue meds &  take with sips of water in AM the day of surgeryium- 2-5 with Dr Donovan.

## 2023-08-24 NOTE — H&P PST ADULT - PROBLEM SELECTOR PLAN 1
Patient is tentatively scheduled for surgery- for cystoscopy, transurethral resection of bladder tumor, medium- 2-5 with Dr Donovan on 09/05/23.    Pre-op instructions provided. Pt given verbal and written instructions with teach back on pepcid. Pt verbalized understanding with return demonstration.  Recent CMP results in chart  CBC, Urine culture, COAGs sent

## 2023-08-24 NOTE — H&P PST ADULT - GENITOURINARY COMMENTS
Pre op dx-  other specified disorders of bladder patient with PMH of bladder tumor - had cystoscopy in the past

## 2023-08-24 NOTE — H&P PST ADULT - PROBLEM SELECTOR PLAN 2
Patient was diagnosed with autoimmune hepatitis in 2017- follows up with hepatologist q6 months- last note in chart  Recent CMP results in chart  COAGs sent  Patient instructed to take azathioprine with a sip of water on the morning of procedure.

## 2023-08-24 NOTE — H&P PST ADULT - NSICDXPASTMEDICALHX_GEN_ALL_CORE_FT
PAST MEDICAL HISTORY:  Autoimmune liver disease     Bladder tumor     H/O osteoporosis     History of cirrhosis     Melanoma left forearm    Obesity     Other specified disorders of bladder     Urothelial cancer

## 2023-08-26 LAB
CULTURE RESULTS: SIGNIFICANT CHANGE UP
SPECIMEN SOURCE: SIGNIFICANT CHANGE UP

## 2023-08-31 ENCOUNTER — EMERGENCY (EMERGENCY)
Facility: HOSPITAL | Age: 68
LOS: 1 days | Discharge: ANOTHER HOSPITAL | End: 2023-08-31
Attending: EMERGENCY MEDICINE | Admitting: EMERGENCY MEDICINE
Payer: MEDICARE

## 2023-08-31 ENCOUNTER — INPATIENT (INPATIENT)
Facility: HOSPITAL | Age: 68
LOS: 0 days | Discharge: HOME | DRG: 55 | End: 2023-09-01
Attending: INTERNAL MEDICINE | Admitting: HOSPITALIST
Payer: MEDICARE

## 2023-08-31 VITALS
TEMPERATURE: 98 F | WEIGHT: 175.05 LBS | HEIGHT: 68 IN | SYSTOLIC BLOOD PRESSURE: 119 MMHG | OXYGEN SATURATION: 98 % | DIASTOLIC BLOOD PRESSURE: 77 MMHG | HEART RATE: 64 BPM | RESPIRATION RATE: 16 BRPM

## 2023-08-31 VITALS
SYSTOLIC BLOOD PRESSURE: 114 MMHG | HEART RATE: 62 BPM | OXYGEN SATURATION: 97 % | RESPIRATION RATE: 18 BRPM | DIASTOLIC BLOOD PRESSURE: 68 MMHG

## 2023-08-31 VITALS
DIASTOLIC BLOOD PRESSURE: 78 MMHG | HEART RATE: 78 BPM | SYSTOLIC BLOOD PRESSURE: 133 MMHG | TEMPERATURE: 98 F | HEIGHT: 68 IN | WEIGHT: 175.05 LBS | OXYGEN SATURATION: 98 % | RESPIRATION RATE: 18 BRPM

## 2023-08-31 DIAGNOSIS — K76.89 OTHER SPECIFIED DISEASES OF LIVER: ICD-10-CM

## 2023-08-31 DIAGNOSIS — D32.9 BENIGN NEOPLASM OF MENINGES, UNSPECIFIED: ICD-10-CM

## 2023-08-31 DIAGNOSIS — R42 DIZZINESS AND GIDDINESS: ICD-10-CM

## 2023-08-31 DIAGNOSIS — Z98.890 OTHER SPECIFIED POSTPROCEDURAL STATES: Chronic | ICD-10-CM

## 2023-08-31 DIAGNOSIS — Z29.9 ENCOUNTER FOR PROPHYLACTIC MEASURES, UNSPECIFIED: ICD-10-CM

## 2023-08-31 DIAGNOSIS — Z98.84 BARIATRIC SURGERY STATUS: Chronic | ICD-10-CM

## 2023-08-31 DIAGNOSIS — D49.4 NEOPLASM OF UNSPECIFIED BEHAVIOR OF BLADDER: ICD-10-CM

## 2023-08-31 PROBLEM — E66.9 OBESITY, UNSPECIFIED: Chronic | Status: ACTIVE | Noted: 2023-08-24

## 2023-08-31 PROBLEM — Z87.39 PERSONAL HISTORY OF OTHER DISEASES OF THE MUSCULOSKELETAL SYSTEM AND CONNECTIVE TISSUE: Chronic | Status: ACTIVE | Noted: 2023-08-24

## 2023-08-31 PROBLEM — Z87.19 PERSONAL HISTORY OF OTHER DISEASES OF THE DIGESTIVE SYSTEM: Chronic | Status: ACTIVE | Noted: 2023-08-24

## 2023-08-31 PROBLEM — C68.9 MALIGNANT NEOPLASM OF URINARY ORGAN, UNSPECIFIED: Chronic | Status: ACTIVE | Noted: 2023-08-24

## 2023-08-31 LAB
ALBUMIN SERPL ELPH-MCNC: 3.3 G/DL — SIGNIFICANT CHANGE UP (ref 3.3–5)
ALP SERPL-CCNC: 117 U/L — SIGNIFICANT CHANGE UP (ref 40–120)
ALT FLD-CCNC: 18 U/L — SIGNIFICANT CHANGE UP (ref 10–45)
ANION GAP SERPL CALC-SCNC: 9 MMOL/L — SIGNIFICANT CHANGE UP (ref 5–17)
APTT BLD: 27.6 SEC — SIGNIFICANT CHANGE UP (ref 24.5–35.6)
AST SERPL-CCNC: 22 U/L — SIGNIFICANT CHANGE UP (ref 10–40)
BASOPHILS # BLD AUTO: 0.02 K/UL — SIGNIFICANT CHANGE UP (ref 0–0.2)
BASOPHILS NFR BLD AUTO: 0.5 % — SIGNIFICANT CHANGE UP (ref 0–2)
BILIRUB SERPL-MCNC: 0.4 MG/DL — SIGNIFICANT CHANGE UP (ref 0.2–1.2)
BUN SERPL-MCNC: 15 MG/DL — SIGNIFICANT CHANGE UP (ref 7–23)
CALCIUM SERPL-MCNC: 9.3 MG/DL — SIGNIFICANT CHANGE UP (ref 8.4–10.5)
CHLORIDE SERPL-SCNC: 107 MMOL/L — SIGNIFICANT CHANGE UP (ref 96–108)
CO2 SERPL-SCNC: 25 MMOL/L — SIGNIFICANT CHANGE UP (ref 22–31)
CREAT SERPL-MCNC: 0.54 MG/DL — SIGNIFICANT CHANGE UP (ref 0.5–1.3)
EGFR: 100 ML/MIN/1.73M2 — SIGNIFICANT CHANGE UP
EOSINOPHIL # BLD AUTO: 0.1 K/UL — SIGNIFICANT CHANGE UP (ref 0–0.5)
EOSINOPHIL NFR BLD AUTO: 2.3 % — SIGNIFICANT CHANGE UP (ref 0–6)
GLUCOSE SERPL-MCNC: 128 MG/DL — HIGH (ref 70–99)
HCT VFR BLD CALC: 36 % — SIGNIFICANT CHANGE UP (ref 34.5–45)
HGB BLD-MCNC: 11.6 G/DL — SIGNIFICANT CHANGE UP (ref 11.5–15.5)
IMM GRANULOCYTES NFR BLD AUTO: 0.2 % — SIGNIFICANT CHANGE UP (ref 0–0.9)
INR BLD: 0.96 RATIO — SIGNIFICANT CHANGE UP (ref 0.85–1.18)
LYMPHOCYTES # BLD AUTO: 0.96 K/UL — LOW (ref 1–3.3)
LYMPHOCYTES # BLD AUTO: 21.7 % — SIGNIFICANT CHANGE UP (ref 13–44)
MCHC RBC-ENTMCNC: 28.9 PG — SIGNIFICANT CHANGE UP (ref 27–34)
MCHC RBC-ENTMCNC: 32.2 GM/DL — SIGNIFICANT CHANGE UP (ref 32–36)
MCV RBC AUTO: 89.6 FL — SIGNIFICANT CHANGE UP (ref 80–100)
MONOCYTES # BLD AUTO: 0.46 K/UL — SIGNIFICANT CHANGE UP (ref 0–0.9)
MONOCYTES NFR BLD AUTO: 10.4 % — SIGNIFICANT CHANGE UP (ref 2–14)
NEUTROPHILS # BLD AUTO: 2.87 K/UL — SIGNIFICANT CHANGE UP (ref 1.8–7.4)
NEUTROPHILS NFR BLD AUTO: 64.9 % — SIGNIFICANT CHANGE UP (ref 43–77)
NRBC # BLD: 0 /100 WBCS — SIGNIFICANT CHANGE UP (ref 0–0)
PLATELET # BLD AUTO: 184 K/UL — SIGNIFICANT CHANGE UP (ref 150–400)
POTASSIUM SERPL-MCNC: 3.7 MMOL/L — SIGNIFICANT CHANGE UP (ref 3.5–5.3)
POTASSIUM SERPL-SCNC: 3.7 MMOL/L — SIGNIFICANT CHANGE UP (ref 3.5–5.3)
PROT SERPL-MCNC: 6.9 G/DL — SIGNIFICANT CHANGE UP (ref 6–8.3)
PROTHROM AB SERPL-ACNC: 10.6 SEC — SIGNIFICANT CHANGE UP (ref 9.5–13)
RBC # BLD: 4.02 M/UL — SIGNIFICANT CHANGE UP (ref 3.8–5.2)
RBC # FLD: 13.9 % — SIGNIFICANT CHANGE UP (ref 10.3–14.5)
SODIUM SERPL-SCNC: 141 MMOL/L — SIGNIFICANT CHANGE UP (ref 135–145)
WBC # BLD: 4.42 K/UL — SIGNIFICANT CHANGE UP (ref 3.8–10.5)
WBC # FLD AUTO: 4.42 K/UL — SIGNIFICANT CHANGE UP (ref 3.8–10.5)

## 2023-08-31 PROCEDURE — 36415 COLL VENOUS BLD VENIPUNCTURE: CPT

## 2023-08-31 PROCEDURE — 99285 EMERGENCY DEPT VISIT HI MDM: CPT | Mod: 25

## 2023-08-31 PROCEDURE — 99223 1ST HOSP IP/OBS HIGH 75: CPT | Mod: GC,AI

## 2023-08-31 PROCEDURE — 70553 MRI BRAIN STEM W/O & W/DYE: CPT | Mod: 26

## 2023-08-31 PROCEDURE — 99285 EMERGENCY DEPT VISIT HI MDM: CPT | Mod: GC

## 2023-08-31 PROCEDURE — 71260 CT THORAX DX C+: CPT | Mod: 26,MA

## 2023-08-31 PROCEDURE — 99221 1ST HOSP IP/OBS SF/LOW 40: CPT

## 2023-08-31 PROCEDURE — 96361 HYDRATE IV INFUSION ADD-ON: CPT

## 2023-08-31 PROCEDURE — 96375 TX/PRO/DX INJ NEW DRUG ADDON: CPT

## 2023-08-31 PROCEDURE — 70545 MR ANGIOGRAPHY HEAD W/DYE: CPT | Mod: 26,XU

## 2023-08-31 PROCEDURE — 96374 THER/PROPH/DIAG INJ IV PUSH: CPT

## 2023-08-31 PROCEDURE — 80053 COMPREHEN METABOLIC PANEL: CPT

## 2023-08-31 PROCEDURE — 70498 CT ANGIOGRAPHY NECK: CPT | Mod: 26,MA

## 2023-08-31 PROCEDURE — 99285 EMERGENCY DEPT VISIT HI MDM: CPT

## 2023-08-31 PROCEDURE — 71045 X-RAY EXAM CHEST 1 VIEW: CPT | Mod: 26

## 2023-08-31 PROCEDURE — 70546 MR ANGIOGRAPH HEAD W/O&W/DYE: CPT | Mod: 26,XU

## 2023-08-31 PROCEDURE — 70450 CT HEAD/BRAIN W/O DYE: CPT | Mod: 26,XU,MA

## 2023-08-31 PROCEDURE — 70496 CT ANGIOGRAPHY HEAD: CPT | Mod: 26,MA

## 2023-08-31 PROCEDURE — 85025 COMPLETE CBC W/AUTO DIFF WBC: CPT

## 2023-08-31 PROCEDURE — 70450 CT HEAD/BRAIN W/O DYE: CPT | Mod: MA

## 2023-08-31 PROCEDURE — 74177 CT ABD & PELVIS W/CONTRAST: CPT | Mod: 26,MA

## 2023-08-31 RX ORDER — METOCLOPRAMIDE HCL 10 MG
10 TABLET ORAL ONCE
Refills: 0 | Status: COMPLETED | OUTPATIENT
Start: 2023-08-31 | End: 2023-08-31

## 2023-08-31 RX ORDER — LANOLIN ALCOHOL/MO/W.PET/CERES
3 CREAM (GRAM) TOPICAL AT BEDTIME
Refills: 0 | Status: DISCONTINUED | OUTPATIENT
Start: 2023-08-31 | End: 2023-09-01

## 2023-08-31 RX ORDER — MECLIZINE HCL 12.5 MG
25 TABLET ORAL EVERY 8 HOURS
Refills: 0 | Status: DISCONTINUED | OUTPATIENT
Start: 2023-08-31 | End: 2023-09-01

## 2023-08-31 RX ORDER — ACETAMINOPHEN 500 MG
650 TABLET ORAL EVERY 6 HOURS
Refills: 0 | Status: DISCONTINUED | OUTPATIENT
Start: 2023-08-31 | End: 2023-09-01

## 2023-08-31 RX ORDER — AZATHIOPRINE 100 MG/1
50 TABLET ORAL
Refills: 0 | Status: DISCONTINUED | OUTPATIENT
Start: 2023-08-31 | End: 2023-09-01

## 2023-08-31 RX ORDER — SODIUM CHLORIDE 9 MG/ML
1000 INJECTION INTRAMUSCULAR; INTRAVENOUS; SUBCUTANEOUS ONCE
Refills: 0 | Status: COMPLETED | OUTPATIENT
Start: 2023-08-31 | End: 2023-08-31

## 2023-08-31 RX ORDER — ONDANSETRON 8 MG/1
4 TABLET, FILM COATED ORAL ONCE
Refills: 0 | Status: COMPLETED | OUTPATIENT
Start: 2023-08-31 | End: 2023-08-31

## 2023-08-31 RX ORDER — METOCLOPRAMIDE HCL 10 MG
5 TABLET ORAL EVERY 8 HOURS
Refills: 0 | Status: DISCONTINUED | OUTPATIENT
Start: 2023-08-31 | End: 2023-09-01

## 2023-08-31 RX ORDER — SODIUM CHLORIDE 9 MG/ML
1000 INJECTION, SOLUTION INTRAVENOUS
Refills: 0 | Status: DISCONTINUED | OUTPATIENT
Start: 2023-08-31 | End: 2023-09-01

## 2023-08-31 RX ORDER — AZATHIOPRINE 100 MG/1
50 TABLET ORAL DAILY
Refills: 0 | Status: DISCONTINUED | OUTPATIENT
Start: 2023-08-31 | End: 2023-08-31

## 2023-08-31 RX ORDER — METOCLOPRAMIDE HCL 10 MG
10 TABLET ORAL EVERY 6 HOURS
Refills: 0 | Status: DISCONTINUED | OUTPATIENT
Start: 2023-08-31 | End: 2023-08-31

## 2023-08-31 RX ORDER — DEXAMETHASONE 0.5 MG/5ML
4 ELIXIR ORAL ONCE
Refills: 0 | Status: COMPLETED | OUTPATIENT
Start: 2023-08-31 | End: 2023-08-31

## 2023-08-31 RX ORDER — ENOXAPARIN SODIUM 100 MG/ML
30 INJECTION SUBCUTANEOUS EVERY 24 HOURS
Refills: 0 | Status: DISCONTINUED | OUTPATIENT
Start: 2023-09-01 | End: 2023-09-01

## 2023-08-31 RX ORDER — MECLIZINE HCL 12.5 MG
12.5 TABLET ORAL EVERY 8 HOURS
Refills: 0 | Status: DISCONTINUED | OUTPATIENT
Start: 2023-08-31 | End: 2023-08-31

## 2023-08-31 RX ORDER — MECLIZINE HCL 12.5 MG
50 TABLET ORAL ONCE
Refills: 0 | Status: COMPLETED | OUTPATIENT
Start: 2023-08-31 | End: 2023-08-31

## 2023-08-31 RX ORDER — ONDANSETRON 8 MG/1
4 TABLET, FILM COATED ORAL EVERY 8 HOURS
Refills: 0 | Status: DISCONTINUED | OUTPATIENT
Start: 2023-08-31 | End: 2023-08-31

## 2023-08-31 RX ORDER — MECLIZINE HCL 12.5 MG
50 TABLET ORAL EVERY 12 HOURS
Refills: 0 | Status: DISCONTINUED | OUTPATIENT
Start: 2023-08-31 | End: 2023-08-31

## 2023-08-31 RX ORDER — DIAZEPAM 5 MG
5 TABLET ORAL ONCE
Refills: 0 | Status: DISCONTINUED | OUTPATIENT
Start: 2023-08-31 | End: 2023-08-31

## 2023-08-31 RX ADMIN — Medication 10 MILLIGRAM(S): at 14:43

## 2023-08-31 RX ADMIN — Medication 5 MILLIGRAM(S): at 08:55

## 2023-08-31 RX ADMIN — Medication 4 MILLIGRAM(S): at 06:38

## 2023-08-31 RX ADMIN — Medication 25 MILLIGRAM(S): at 22:18

## 2023-08-31 RX ADMIN — Medication 0.5 MILLIGRAM(S): at 05:12

## 2023-08-31 RX ADMIN — ONDANSETRON 4 MILLIGRAM(S): 8 TABLET, FILM COATED ORAL at 05:11

## 2023-08-31 RX ADMIN — SODIUM CHLORIDE 1000 MILLILITER(S): 9 INJECTION INTRAMUSCULAR; INTRAVENOUS; SUBCUTANEOUS at 06:44

## 2023-08-31 RX ADMIN — Medication 50 MILLIGRAM(S): at 11:46

## 2023-08-31 RX ADMIN — SODIUM CHLORIDE 2000 MILLILITER(S): 9 INJECTION INTRAMUSCULAR; INTRAVENOUS; SUBCUTANEOUS at 05:11

## 2023-08-31 NOTE — ED ADULT NURSE NOTE - OBJECTIVE STATEMENT
Pt is 67y F with PMH hepatitis B, stage IV cirrhosis, bladder low-grade cancerous nodules to be removed (9/5/2023), transferred from [place] for meningoma. Pt reported to [place] ED for onset of lightheadedness, nausea/vomiting yesterday 2300, where imaging revealed meningoma located in cerebellum. Transferred to [facility] by EMS for neurosurgery consult. Upon assessment, A&Ox4, reports lightheadedness, nauseous with dry heaves, vitals WNL. Unable to sit up without nausea. Family at bedside.

## 2023-08-31 NOTE — ED PROVIDER NOTE - PHYSICAL EXAMINATION
General:     NAD, well-nourished, well-appearing  Head:     NC/AT, EOMI, oral mucosa moist  +Nystagmaus  Neck:     supple  Lungs:     CTA b/l, no w/r/r  CVS:     S1S2, RRR, no m/g/r  Abd:     +BS, s/nt/nd, no organomegaly  Ext:    2+ radial and pedal pulses, no c/c/e  Neuro: grossly intact

## 2023-08-31 NOTE — CONSULT NOTE ADULT - SUBJECTIVE AND OBJECTIVE BOX
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 Per patient, was feeling fine until yesterday morning 1130 when she started having headaches 5/10 as well as "room spinning" sensation worse with head movements as well as double vision. Describes double vision as "can't focus, I keep seeing 2." Also endorses some nausea, has not vomited although she has been dry heaving.  States she had similar sensation with vertigo a few months back but resolved on its own without intervention. Also has sensitivity to light. Initially presented to [facility], found to have midline tentorial cerebellar vermis mass ~2x2cm on noncon CTH concerning for meningioma, given IVF, zofran, benzo and transferred to [facility] for further diagnostics and management with neurosurgery. On arrival, vitals stable and A&Ox4. Denies any auditory disturbances, weakness or parasthesias/sensory changes. Continues to have nausea, dizziness ha with head turning.       Review of Systems:    CONSTITUTIONAL: No fevers or chills  EYES AND ENT: No visual changes or no throat pain   NECK: No pain or stiffness  RESPIRATORY: No hemoptysis or shortness of breath  CARDIOVASCULAR: No chest pain or palpitations  GASTROINTESTINAL: No melena or hematochezia  GENITOURINARY: No dysuria or hematuria  NEUROLOGICAL: +As stated in HPI above  SKIN: No itching, burning, rashes, or lesions   All other review of systems is negative unless indicated above.      Allergies:  No Known Allergies      PMHx/PSHx/Family Hx: As above, otherwise see below   Other specified disorders of bladder    Autoimmune liver disease    Melanoma    Bladder tumor    Obesity    H/O osteoporosis    History of cirrhosis    Urothelial cancer        Social Hx:  No current use of tobacco, alcohol, or illicit drugs    Medications:  MEDICATIONS  (STANDING):    MEDICATIONS  (PRN):        Home Medications:  azaTHIOprine 50 mg oral tablet: 2 tab(s) orally once a day (24 Aug 2023 08:21)  prednisone: 2.5 mg po once daily (24 Aug 2023 08:22)      Vitals:  T(C): 36.8 (08-31-23 @ 08:45), Max: 36.9 (08-31-23 @ 08:34)  HR: 65 (08-31-23 @ 12:10) (62 - 78)  BP: 112/66 (08-31-23 @ 12:10) (110/60 - 133/78)  RR: 18 (08-31-23 @ 12:10) (14 - 18)  SpO2: 99% (08-31-23 @ 12:10) (94% - 99%)    Physical Examination:   General - NAD  Cardiovascular - Peripheral pulses palpable, no edema    Neurologic Exam:  Mental status - Awake, Alert, Oriented to person, place, and time. Speech fluent, repetition and naming intact. Follows simple and complex commands.     Cranial nerves - PERRL, VFF, EOMI, face sensation (V1-V3) intact LT, No facial asymmetry b/l, hearing grossly intact b/l, trapezius 5/5 strength b/l, tongue midline on protrusion   HINTS exam- Normal VOR (fixed gaze), R horizontal beating fixed nystagmus, positive skew    Motor - Normal bulk and tone throughout. No pronator drift.    Strength testing            Deltoid      Biceps      Triceps     Wrist Extension    Wrist Flexion       R            5                 5               5                     5                              5                        5  L             5                 5               5                     5                              5                       5              Hip Flexion    Hip Extension    Knee Flexion    Knee Extension    Dorsiflexion    Plantar Flexion  R              5                           5                       5                           5                            5                          5  L              5                           5                        5                           5                            5                          5    Sensation - Light touch/temperature intact throughout    DTR's -             Biceps      Triceps     Brachioradialis      Patellar    Ankle    Toes/plantar response  R             2+             2+                  2+                       2+            2+                 Down  L              2+             2+                 2+                        2+           2+                 Down    Coordination - Finger to Nose intact b/l. No tremors appreciated    Gait and station - did not assess, patient feeling very dizzy and nausea with standing    Labs:                        11.6   4.42  )-----------( 184      ( 31 Aug 2023 05:15 )             36.0     08-31    141  |  107  |  15  ----------------------------<  128<H>  3.7   |  25  |  0.54    Ca    9.3      31 Aug 2023 05:15    TPro  6.9  /  Alb  3.3  /  TBili  0.4  /  DBili  x   /  AST  22  /  ALT  18  /  AlkPhos  117  08-31    CAPILLARY BLOOD GLUCOSE        LIVER FUNCTIONS - ( 31 Aug 2023 05:15 )  Alb: 3.3 g/dL / Pro: 6.9 g/dL / ALK PHOS: 117 U/L / ALT: 18 U/L / AST: 22 U/L / GGT: x             PT/INR - ( 31 Aug 2023 11:12 )   PT: 10.6 sec;   INR: 0.96 ratio         PTT - ( 31 Aug 2023 11:12 )  PTT:27.6 sec      Radiology:    < from: CT Head No Cont (08.31.23 @ 05:35) >    No acute intracranial hemorrhage or mass effect.    1.8 x 1.9 x 1.8 cm dural based mass along the straight sinus above the   cerebellar vermis, most suggestive of a meningioma. Correlate with   nonemergent brain MRI. Neurology - Consult Note    -  Spectra: [phone] ([facility]), [phone] ([facility])  -    HPI:     Patient ALINA PARIKH is a 67y (1955) F with a PMHx significant for nonalcoholic autoimmune hepatitis on azathioprine, cirrhosis, bladder polyps and bladder lesion s/p cystoscopy (pending further eval), osteoporosis, obesity who presented on 8/31/2023 with acute onset headaches, dizziness, and double vision since yesterday morning. Per patient, was feeling fine until yesterday morning 1130 when she started having headaches 5/10 as well as "room spinning" sensation worse with head movements as well as double vision. Describes double vision as "can't focus, I keep seeing 2." Also endorses some nausea, has not vomited although she has been dry heaving.  States she had similar sensation with vertigo a few months back but resolved on its own without intervention. Also has sensitivity to light. Initially presented to [facility], found to have midline tentorial cerebellar vermis mass ~2x2cm on noncon CTH concerning for meningioma, given IVF, zofran, benzo and transferred to [facility] for further diagnostics and management with neurosurgery. On arrival, vitals stable and A&Ox4. Denies any auditory disturbances, weakness or parasthesias/sensory changes. Continues to have nausea, dizziness ha with head turning.       Review of Systems:    CONSTITUTIONAL: No fevers or chills  EYES AND ENT: No visual changes or no throat pain   NECK: No pain or stiffness  RESPIRATORY: No hemoptysis or shortness of breath  CARDIOVASCULAR: No chest pain or palpitations  GASTROINTESTINAL: No melena or hematochezia  GENITOURINARY: No dysuria or hematuria  NEUROLOGICAL: +As stated in HPI above  SKIN: No itching, burning, rashes, or lesions   All other review of systems is negative unless indicated above.      Allergies:  No Known Allergies      PMHx/PSHx/Family Hx: As above, otherwise see below   Other specified disorders of bladder    Autoimmune liver disease    Melanoma    Bladder tumor    Obesity    H/O osteoporosis    History of cirrhosis    Urothelial cancer        Social Hx:  No current use of tobacco, alcohol, or illicit drugs    Medications:  MEDICATIONS  (STANDING):    MEDICATIONS  (PRN):        Home Medications:  azaTHIOprine 50 mg oral tablet: 2 tab(s) orally once a day (24 Aug 2023 08:21)  prednisone: 2.5 mg po once daily (24 Aug 2023 08:22)      Vitals:  T(C): 36.8 (08-31-23 @ 08:45), Max: 36.9 (08-31-23 @ 08:34)  HR: 65 (08-31-23 @ 12:10) (62 - 78)  BP: 112/66 (08-31-23 @ 12:10) (110/60 - 133/78)  RR: 18 (08-31-23 @ 12:10) (14 - 18)  SpO2: 99% (08-31-23 @ 12:10) (94% - 99%)    Physical Examination:   General - NAD  Cardiovascular - Peripheral pulses palpable, no edema  Eyes - Fundoscopy not well visualized    Neurologic Exam:  Mental status - Awake, Alert, Oriented to person, place, and time. Speech fluent, repetition and naming intact. Follows simple and complex commands. Attention/concentraion, recent and remote memory, and fund of knowledge intact    Cranial nerves - PERRL, VFF, EOMI, face sensation (V1-V3) intact LT, No facial asymmetry b/l, hearing grossly intact b/l, trapezius 5/5 strength b/l, tongue midline on protrusion, symmetric palate elevation  HINTS exam- Normal VOR (fixed gaze), R horizontal beating fixed nystagmus, positive skew    Motor - Normal bulk and tone throughout. No pronator drift.    Strength testing            Deltoid      Biceps      Triceps     Wrist Extension    Wrist Flexion       R            5                 5               5                     5                              5                        5  L             5                 5               5                     5                              5                       5              Hip Flexion    Hip Extension    Knee Flexion    Knee Extension    Dorsiflexion    Plantar Flexion  R              5                           5                       5                           5                            5                          5  L              5                           5                        5                           5                            5                          5    Sensation - Light touch/temperature intact throughout    DTR's -             Biceps      Triceps     Brachioradialis      Patellar    Ankle    Toes/plantar response  R             2+             2+                  2+                       2+            2+                 Down  L              2+             2+                 2+                        2+           2+                 Down    Coordination - Finger to Nose intact b/l. No tremors appreciated    Gait and station - did not assess, patient feeling very dizzy and nausea with standing    Labs:                        11.6   4.42  )-----------( 184      ( 31 Aug 2023 05:15 )             36.0     08-31    141  |  107  |  15  ----------------------------<  128<H>  3.7   |  25  |  0.54    Ca    9.3      31 Aug 2023 05:15    TPro  6.9  /  Alb  3.3  /  TBili  0.4  /  DBili  x   /  AST  22  /  ALT  18  /  AlkPhos  117  08-31    CAPILLARY BLOOD GLUCOSE        LIVER FUNCTIONS - ( 31 Aug 2023 05:15 )  Alb: 3.3 g/dL / Pro: 6.9 g/dL / ALK PHOS: 117 U/L / ALT: 18 U/L / AST: 22 U/L / GGT: x             PT/INR - ( 31 Aug 2023 11:12 )   PT: 10.6 sec;   INR: 0.96 ratio         PTT - ( 31 Aug 2023 11:12 )  PTT:27.6 sec      Radiology:    < from: CT Head No Cont (08.31.23 @ 05:35) >    No acute intracranial hemorrhage or mass effect.    1.8 x 1.9 x 1.8 cm dural based mass along the straight sinus above the   cerebellar vermis, most suggestive of a meningioma. Correlate with   nonemergent brain MRI.

## 2023-08-31 NOTE — ED PROVIDER NOTE - OBJECTIVE STATEMENT
68 y/o F presents to ED c/o sudden onset headache, dizziness, nausea and dry heaving since yesterday 11 AM , c/o her symptoms worsens with head movement. No arm or leg weakness. no fever, no neck stiffness.

## 2023-08-31 NOTE — CONSULT NOTE ADULT - ATTENDING COMMENTS
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  Patient's vertigo symptoms are likely unrelated to meningioma.  Because the meningioma is relatively small and is currently asymptomatic, no neurosurgical intervention is indicated at this time.  No neurosurgical contraindication to upcoming bladder surgery with urology.    - Recommend MRI brain w/ and w/o contrast stereotactic protocol to better characterize meningioma  - Follow up outpatient with Dr. Emery of neurosurgery  - Plan will likely be repeat MRI brain in 1 year. If lesion grows or becomes symptomatic, will treat with Gamma Knife radiosurgery, as patient's cirrhosis increases risk of hemorrhage with open surgery. If lesion does not grow, will continue to monitor with serial imaging.
HPI as per resident note, personally verified by me. Patient with headache and dizziness since this morning without other focal neurologic deficits. It is worsened with head and body movements. CT head showed likely posterior fossa meningioma at the superior cerebellar vermis without obvious mass effect or edema but she was transferred to [facility] for further NRS evaluation. She denies any hearing or vision changes. No LOC. No help with meclizine and she is still having nausea and cannot ambulate. Had a similar episode three months ago that resolved but this is much worse.    Neurologic exam as per resident note with additions as below:  AAO x3, speech fluent  CN's II-XII intact except for R horizontal nystagmus in primary and R ritchie gaze with R torsional nystagmus on upgaze. HITS (+) with LEFT > RIGHT head turn  Strength 5/5 all  Sens intact all  FtN and HtS intact b/l  Downgoing b/l plantar response    < from: CT Angio Neck w/ IV Cont (08.31.23 @ 14:40) >    CTA COW:  Patent intracranial circulation without flow limiting stenosis   or large vessel occlusion.    CTA NECK: Patent, ECAs, ICAs, no  hemodynamically significant stenosis at    ICA origins by NASCET criteria.  Bilateral vertebral arteries are patent without flow limiting stenosis.    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 Posterior fossa meningioma is likely incidental to this. Very low suspicion for posterior fossa cerebrovascular event  - Symptom control, can also consider diazepam 5-10mg PO TID prn if no response to meclizine  - If needs admission recommend medicine admission as no primary neurologic cause at this time  - Defer need of MRI and MRV head to NRS for work-up of probable meningioma  - PT/OT as tolerated, vestibular rehab as outpatient  - Continue to address above medical and surgical issues, as you are doing  - Will continue to follow patient with you if she is admitted to the hospital

## 2023-08-31 NOTE — ED PROVIDER NOTE - CLINICAL SUMMARY MEDICAL DECISION MAKING FREE TEXT BOX
66 y/o F presents to ED c/o sudden onset headache, dizziness, nausea and dry heaving since yesterday 11 AM , c/o her symptoms worsens with head movement. No arm or leg weakness. no fever, no neck stiffness. Neuro exam was unremarkable , ordered Lab and CT scan and gave IV antiemetic and small dose of Lorazepam 68 y/o F presents to ED c/o sudden onset headache, dizziness, nausea and dry heaving since yesterday 11 AM , c/o her symptoms worsens with head movement. No arm or leg weakness. no fever, no neck stiffness. Neuro exam was unremarkable , ordered Lab and CT scan and gave IV antiemetic and small dose of Lorazepam, pt had some improvement in symptoms, found to have meningioma on cerebellar vermis, called [facility] and transferred pt to [facility]

## 2023-08-31 NOTE — ED ADULT NURSE NOTE - NSFALLRISKINTERV_ED_ALL_ED

## 2023-08-31 NOTE — ED ADULT NURSE NOTE - NSFALLUNIVINTERV_ED_ALL_ED
Bed/Stretcher in lowest position, wheels locked, appropriate side rails in place/Call bell, personal items and telephone in reach/Instruct patient to call for assistance before getting out of bed/chair/stretcher/Non-slip footwear applied when patient is off stretcher/[place] to call system/Physically safe environment - no spills, clutter or unnecessary equipment/Purposeful proactive rounding/Room/bathroom lighting operational, light cord in reach

## 2023-08-31 NOTE — PATIENT PROFILE ADULT - CENTRAL VENOUS CATHETER/PICC LINE
My COPD Action Plan     Name: Bhaskar Carrillo    YOB: 1943   Date: 7/10/2018    My doctor: Ramona An PA-C   My clinic: [address] [address] [phone]  My Controller Medicine:    Dose: none     My Rescue Medicine: Albuterol (Proair/Ventolin/Proventil) inhaler   Dose: 2 puffs every 4-6 hours      My Flare Up Medicine:    Dose:  FEV-1 (no units)   Date Value   01/13/2014 1.45     FEV1/FVC (no units)   Date Value   01/13/2014 88      My COPD Severity: Moderate = FeV1 < 79% -50%      Use of Oxygen: Oxygen Not Prescribed      Make sure you've had your pneumonia   vaccines.          GREEN ZONE       Doing well today      Usual level of activity and exercise    Usual amount of cough and mucus    No shortness of breath    Usual level of health (thinking clearly, sleeping well, feel like eating) Actions:      Take daily medicines    Use oxygen as prescribed    Follow regular exercise and diet plan    Avoid cigarette smoke and other irritants that harm the lungs           YELLOW ZONE          Having a bad day or flare up      Short of breath more than usual    A lot more sputum (mucus) than usual    Sputum looks yellow, green, tan, brown or bloody    More coughing or wheezing    Fever or chills    Less energy; trouble completing activities    Trouble thinking or focusing    Using quick relief inhaler or nebulizer more often    Poor sleep; symptoms wake me up    Do not feel like eating Actions:      Get plenty of rest    Take daily medicines    Use quick relief inhaler every 2-4 hours    If you use oxygen, call you doctor to see if you should adjust your oxygen    Do breathing exercises or other things to help you relax    Let a loved one, friend or neighbor know you are feeling worse    Call your care team if you have 2 or more symptoms.  Start taking steroids or antibiotics if directed by your care team           RED ZONE       Need medical care  now      Severe shortness of breath (feel you can't breathe)    Fever, chills    Not enough breath to do any activity    Trouble coughing up mucus, walking or talking    Blood in mucus    Frequent coughing   Rescue medicines are not working    Not able to sleep because of breathing    Feel confused or drowsy    Chest pain    Actions:      Call your health care team.  If you cannot reach your care team, call 911 or go to the emergency room.        Annual Reminders:  Meet with Care Team, Flu Shot every Fall  Pharmacy: [facility] - [place] - [facility] [address]   no

## 2023-08-31 NOTE — ED PROVIDER NOTE - CARE PLAN
1 Principal Discharge DX:	Vertigo   Principal Discharge DX:	Vertigo  Secondary Diagnosis:	Meningioma

## 2023-08-31 NOTE — ED PROVIDER NOTE - CLINICAL SUMMARY MEDICAL DECISION MAKING FREE TEXT BOX
Dick PGY3: 68yo F with PMH of autoimmune hepatitis on azathioprine, cirrhosis, ?bladder lesion (unknown dx, pending eval) presents as transfer from [place] for eval of brain mass suspected meningioma posteriorly with symptoms. Unable to even sit up w/o sxs, nystagmus. No hx of persistent HA or neuro sxs prior. CT cerebellar vermis mass ~2x2 cm suspicious for meningioma. Neurosx eval. Sxs control.

## 2023-08-31 NOTE — CONSULT NOTE ADULT - ASSESSMENT
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 Per patient, was feeling fine until yesterday morning 1130 when she started having headaches 5/10 as well as "room spinning" sensation worse with head movements as well as double vision. Describes double vision as "can't focus, I keep seeing 2." Also endorses some nausea, has not vomited although she has been dry heaving.  States she had similar sensation with vertigo a few months back but resolved on its own without intervention. Also has sensitivity to light. Initially presented to [facility], found to have midline tentorial cerebellar vermis mass ~2x2cm on noncon CTH concerning for meningioma, given IVF, zofran, benzo and transferred to [facility] for further diagnostics and management with neurosurgery. On arrival, vitals stable and A&Ox4. Denies any auditory disturbances, weakness or parasthesias/sensory changes. Continues to have nausea, dizziness ha with head turning. HINTS exam abnormal with vertical skew, R horizontal nystagmus and corrective saccade. No other focal neurological deficits, cranial nerves wnl and no motor/sensory abnormalities.     IMPRESSION   1d of acute onset headaches, dizziness, double vision with CTH noncon showing cerebellar vermis mass concerning for meningioma with abnormal HINTS exam concerning for central vertigo although can't rule out peripheral vertigo with incidental meningioma on imaging vs. cardiac vs. toxic metabolic etiology.     Recommendations:  [ ] further evaluation with MRI brain w/wo  [ ] Symptomatic management w/ IVF, Meclizine 12.5-50 mg BID prn, zofran  [ ] neurosurgery recs  [ ] if meningismus and/or febrile, consider LP  [ ] PT/OT   [ ] dysphagia screen    Patient to be seen by team and attending. Note finalized upon attending attestation.  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 Per patient, was feeling fine until yesterday morning 1130 when she started having headaches 5/10 as well as "room spinning" sensation worse with head movements as well as double vision. Describes double vision as "can't focus, I keep seeing 2." Also endorses some nausea, has not vomited although she has been dry heaving.  States she had similar sensation with vertigo a few months back but resolved on its own without intervention. Also has sensitivity to light. Initially presented to [facility], found to have midline tentorial cerebellar vermis mass ~2x2cm on noncon CTH concerning for meningioma, given IVF, zofran, benzo and transferred to [facility] for further diagnostics and management with neurosurgery. On arrival, vitals stable and A&Ox4. Denies any auditory disturbances, weakness or parasthesias/sensory changes. Continues to have nausea, dizziness ha with head turning. HINTS exam abnormal with vertical skew, R horizontal nystagmus and corrective saccade. No other focal neurological deficits, cranial nerves wnl and no motor/sensory abnormalities.     IMPRESSION   1d of acute onset headaches, dizziness, double vision with CTH noncon showing cerebellar vermis mass concerning for meningioma with abnormal HINTS exam concerning for central vertigo although can't rule out peripheral vertigo with incidental meningioma on imaging vs. cardiac vs. toxic metabolic etiology.     Recommendations:  [ ] further evaluation with MRI brain w/wo, MR vessel brain w/ contrast  [ ] Symptomatic management w/ IVF, Meclizine 50mg BID prn, zofran  [ ] neurosurgery recs  [ ] PT/OT   [ ] dysphagia screen    Patient to be seen by team and attending. Note finalized upon attending attestation.  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 Per patient, was feeling fine until yesterday morning 1130 when she started having headaches 5/10 as well as "room spinning" sensation worse with head movements as well as double vision. Describes double vision as "can't focus, I keep seeing 2." Also endorses some nausea, has not vomited although she has been dry heaving.  States she had similar sensation with vertigo a few months back but resolved on its own without intervention. Also has sensitivity to light. Initially presented to [facility], found to have midline tentorial cerebellar vermis mass ~2x2cm on noncon CTH concerning for meningioma, given IVF, zofran, benzo and transferred to [facility] for further diagnostics and management with neurosurgery. On arrival, vitals stable and A&Ox4. Denies any auditory disturbances, weakness or parasthesias/sensory changes. Continues to have nausea, dizziness ha with head turning. HINTS exam abnormal with vertical skew, R horizontal nystagmus and corrective saccade. No other focal neurological deficits, cranial nerves wnl and no motor/sensory abnormalities.     IMPRESSION   1d of acute onset headaches, dizziness, double vision with CTH noncon showing cerebellar vermis mass concerning for meningioma with abnormal HINTS exam concerning for central vertigo although can't rule out peripheral vertigo with incidental meningioma on imaging vs. cardiac vs. toxic metabolic etiology.     Recommendations:  [ ] further evaluation with MRI brain w/wo, MR vessel brain w/ contrast, CTA H/N w/ contrast  [ ] Symptomatic management w/ IVF, Meclizine 50mg BID prn, zofran  [ ] neurosurgery recs  [ ] PT/OT   [ ] dysphagia screen    Patient to be seen by team and attending. Note finalized upon attending attestation.  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 Per patient, was feeling fine until yesterday morning 1130 when she started having headaches 5/10 as well as "room spinning" sensation worse with head movements as well as double vision. Describes double vision as "can't focus, I keep seeing 2." Also endorses some nausea, has not vomited although she has been dry heaving.  States she had similar sensation with vertigo a few months back but resolved on its own without intervention. Also has sensitivity to light. Initially presented to [facility], found to have midline tentorial cerebellar vermis mass ~2x2cm on noncon CTH concerning for meningioma, given IVF, zofran, benzo and transferred to [facility] for further diagnostics and management with neurosurgery. On arrival, vitals stable and A&Ox4. Denies any auditory disturbances, weakness or parasthesias/sensory changes. Continues to have nausea, dizziness ha with head turning. HINTS exam abnormal with vertical skew, R horizontal nystagmus and corrective saccade. No other focal neurological deficits, cranial nerves wnl and no motor/sensory abnormalities.     IMPRESSION   1d of acute onset headaches, dizziness, double vision with CTH noncon showing cerebellar vermis mass concerning for meningioma with abnormal HINTS exam concerning for central vertigo although can't rule out peripheral vertigo with incidental meningioma on imaging vs. cardiac vs. toxic metabolic etiology.     Recommendations:  [ ] CTA H/N w/ contrast  [ ] further evaluation with MRI brain w/wo, MR vessel brain w/ contrast  [ ] Symptomatic management w/ IVF, Meclizine 50mg BID prn, Reglan 10mg QID  [ ] neurosurgery recs  [ ] PT/OT   [ ] dysphagia screen    Patient to be seen by team and attending. Note finalized upon attending attestation.  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 Per patient, was feeling fine until yesterday morning 1130 when she started having headaches 5/10 as well as "room spinning" sensation worse with head movements as well as double vision. Describes double vision as "can't focus, I keep seeing 2." Also endorses some nausea, has not vomited although she has been dry heaving.  States she had similar sensation with vertigo a few months back but resolved on its own without intervention. Also has sensitivity to light. Initially presented to [facility], found to have midline tentorial cerebellar vermis mass ~2x2cm on noncon CTH concerning for meningioma, given IVF, zofran, benzo and transferred to [facility] for further diagnostics and management with neurosurgery. On arrival, vitals stable and A&Ox4. Denies any auditory disturbances, weakness or parasthesias/sensory changes. Continues to have nausea, dizziness ha with head turning. HINTS exam abnormal with vertical skew, R horizontal nystagmus and corrective saccade. No other focal neurological deficits, cranial nerves wnl and no motor/sensory abnormalities.     IMPRESSION   1d of acute onset headaches, dizziness, double vision with CTH noncon showing cerebellar vermis mass concerning for meningioma with abnormal HINTS exam concerning for central vertigo although can't rule out peripheral vertigo with incidental meningioma on imaging vs. cardiac vs. toxic metabolic etiology.     Recommendations:  [ ] CTA H/N w/ contrast  [ ] further evaluation with MRI brain w/wo, MR vessel brain w/ contrast  [ ] Symptomatic management w/ IVF, Meclizine 50mg BID prn, Reglan 10mg QID  [ ] Check Acetylcholine receptor binding/blocking/modulating ab, MuSK ab, LRP4 ab, voltage gated ca channel ab  [ ] Urinalysis, urine culture, CXR to r/o any infectious etiology  [ ] neurosurgery recs  [ ] PT/OT   [ ] dysphagia screen    Patient to be seen by team and attending. Note finalized upon attending attestation.  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 Per patient, was feeling fine until yesterday morning 1130 when she started having headaches 5/10 as well as "room spinning" sensation worse with head movements as well as double vision. Describes double vision as "can't focus, I keep seeing 2." Also endorses some nausea, has not vomited although she has been dry heaving.  States she had similar sensation with vertigo a few months back but resolved on its own without intervention. Also has sensitivity to light. Initially presented to [facility], found to have midline tentorial cerebellar vermis mass ~2x2cm on noncon CTH concerning for meningioma, given IVF, zofran, benzo and transferred to [facility] for further diagnostics and management with neurosurgery. On arrival, vitals stable and A&Ox4. Denies any auditory disturbances, weakness or parasthesias/sensory changes. Continues to have nausea, dizziness ha with head turning. HINTS exam abnormal with vertical skew, R horizontal nystagmus and corrective saccade. No other focal neurological deficits, cranial nerves wnl and no motor/sensory abnormalities.     IMPRESSION   1d of acute onset headaches, dizziness, double vision with CTH noncon showing cerebellar vermis mass concerning for meningioma with abnormal HINTS exam concerning for central vertigo although can't rule out peripheral vertigo with incidental meningioma on imaging vs. cardiac vs. toxic metabolic etiology.     Recommendations:  [ ] CTA H/N w/ contrast  [ ] further evaluation with MRI brain w/wo, MR vessel brain w/ contrast  [ ] Symptomatic management w/ IVF, Meclizine 50mg BID prn, Reglan 10mg QID  [ ] neurosurgery recs  [ ] PT/OT   [ ] dysphagia screen    Patient to be seen by team and attending. Note finalized upon attending attestation.

## 2023-08-31 NOTE — ED ADULT TRIAGE NOTE - SPO2 (%)
98 Quality 47: Advance Care Plan: Advance Care Planning discussed and documented in the medical record; patient did not wish or was not able to name a surrogate decision maker or provide an advance care plan. Quality 111:Pneumonia Vaccination Status For Older Adults: Pneumococcal vaccine (PPSV23) administered on or after patient’s 60th birthday and before the end of the measurement period Quality 110: Preventive Care And Screening: Influenza Immunization: Influenza Immunization Administered during Influenza season Quality 431: Preventive Care And Screening: Unhealthy Alcohol Use - Screening: Patient not identified as an unhealthy alcohol user when screened for unhealthy alcohol use using a systematic screening method Quality 226: Preventive Care And Screening: Tobacco Use: Screening And Cessation Intervention: Patient screened for tobacco use and is an ex/non-smoker Detail Level: Generalized Quality 130: Documentation Of Current Medications In The Medical Record: Current Medications Documented

## 2023-08-31 NOTE — H&P ADULT - PROBLEM SELECTOR PLAN 1
Patient reports acute onset vertigo, and unsteadiness on feet. No other neuro deficits so stroke less likely.   Possibly from ?meningioma seen on CT, though can be peripheral.   No nystagmus appreciated on exam.   - neuro eval appreciated  -- Symptomatic management w/ IVF, Meclizine 50mg BID prn, Reglan 10mg QID  - pending MRI Head + and without COntrast   - PT/OT evaluation   - vestibular rehab on DC likely  - dysphagia screen

## 2023-08-31 NOTE — PATIENT PROFILE ADULT - FALL HARM RISK - HARM RISK INTERVENTIONS

## 2023-08-31 NOTE — H&P ADULT - ATTENDING COMMENTS
patient is a 67 year old female with past medical history of autoimmune hepatitis, cirrhosis, presented initially to  with acute onset headache, dizziness,  double vision, nausea, CT head showed likely posterior fossa meningioma at the superior cerebellar vermis, transferred to [facility] for further neurosurgery eval.     #dizziness suspected peripheral, bppv, rule out central, cardiac, toxic, infectious, etiology, CVA  #meningoma, posterior fossa    - MRI brain ordered - pending  - check EKG  - meclizine for symptom control  - f/u neuro and neurosurgery team for further recs  - neuro checks. 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     #dizziness suspected peripheral, bppv, rule out central, cardiac, toxic, infectious etiology, CVA  #meningoma, posterior fossa    - MRI brain ordered - pending  - check EKG  - meclizine for symptom control  - f/u neuro and neurosurgery team for further recs  - neuro checks. 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     #dizziness suspected peripheral, bppv, rule out central, cardiac, toxic, infectious etiology, CVA  #meningoma, posterior fossa    - MRI brain ordered - pending  - check EKG  - meclizine for symptom control  - outpatient f/u for possible GKRS if indicated per neurosurgery   - f/u neuro and neurosurgery team for further recs  - neuro checks. 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

## 2023-08-31 NOTE — H&P ADULT - PROBLEM SELECTOR PLAN 3
s/p resection of one tumor, was told to be cancerous  planned for removal of subsequent bladder tumors next week  - outpatient urological follow up

## 2023-08-31 NOTE — ED ADULT NURSE REASSESSMENT NOTE - NS ED NURSE REASSESS COMMENT FT1
MRI forms completed by pt and pt spouse. Given to Red desk to be faxed. Pt denies discomfort or lightheadedness at rest, only upon exertion. Given meclizine, tolerated well.

## 2023-08-31 NOTE — ED PROVIDER NOTE - CARE PLAN
1 Principal Discharge DX:	Dizziness  Secondary Diagnosis:	Visual changes  Secondary Diagnosis:	Diplopia

## 2023-08-31 NOTE — H&P ADULT - NSHPPHYSICALEXAM_GEN_ALL_CORE
ICU Vital Signs Last 24 Hrs  T(C): 37 (31 Aug 2023 17:45), Max: 37 (31 Aug 2023 17:45)  T(F): 98.6 (31 Aug 2023 17:45), Max: 98.6 (31 Aug 2023 17:45)  HR: 66 (31 Aug 2023 17:45) (62 - 78)  BP: 90/51 (31 Aug 2023 17:45) (90/51 - 133/78)  BP(mean): 64 (31 Aug 2023 17:45) (64 - 86)  ABP: --  ABP(mean): --  RR: 16 (31 Aug 2023 17:45) (14 - 18)  SpO2: 97% (31 Aug 2023 17:45) (94% - 99%)    O2 Parameters below as of 31 Aug 2023 17:45  Patient On (Oxygen Delivery Method): room air      GENERAL APPEARANCE: Well developed, appears uncomfortable due to dizziness described.   HEENT:  PERRL, EOMI. hearing grossly intact.  NECK: Neck supple, non-tender no lymphadenopathy, masses or thyromegaly.  CARDIAC: Normal S1 and S2. no mrg. RRR  LUNGS: Clear to auscultation B/L, no rales, rhonchi, or wheezing  ABDOMEN: Soft , NTND, bowel sounds normal. No guarding or rebound.   MUSCULOSKELETAL: ROM intact.  No joint erythema or tenderness.   EXTREMITIES: No edema. Peripheral pulses intact.   NEUROLOGICAL: CN 2-12 intact. Non focal. Strength and sensation symmetric and intact throughout.   SKIN: Warm and dry , Well perfused  PSYCHIATRIC: AOx4, Normal mood and affect none

## 2023-08-31 NOTE — H&P ADULT - NSHPLABSRESULTS_GEN_ALL_CORE
Labs:                          11.6   4.42  )-----------( 184      ( 31 Aug 2023 05:15 )             36.0     08-31    141  |  107  |  15  ----------------------------<  128<H>  3.7   |  25  |  0.54    Ca    9.3      31 Aug 2023 05:15    TPro  6.9  /  Alb  3.3  /  TBili  0.4  /  DBili  x   /  AST  22  /  ALT  18  /  AlkPhos  117  08-31    LIVER FUNCTIONS - ( 31 Aug 2023 05:15 )  Alb: 3.3 g/dL / Pro: 6.9 g/dL / ALK PHOS: 117 U/L / ALT: 18 U/L / AST: 22 U/L / GGT: x           PT/INR - ( 31 Aug 2023 11:12 )   PT: 10.6 sec;   INR: 0.96 ratio         PTT - ( 31 Aug 2023 11:12 )  PTT:27.6 sec  CAPILLARY BLOOD GLUCOSE                  Urinalysis Basic - ( 31 Aug 2023 05:15 )    Color: x / Appearance: x / SG: x / pH: x  Gluc: 128 mg/dL / Ketone: x  / Bili: x / Urobili: x   Blood: x / Protein: x / Nitrite: x   Leuk Esterase: x / RBC: x / WBC x   Sq Epi: x / Non Sq Epi: x / Bacteria: x        Micro:      RADIOLOGY & ADDITIONAL TESTS:    EKG:      Echo:      Xray:      US/CT/MRI:  < from: CT Abdomen and Pelvis w/ IV Cont (08.31.23 @ 14:40) >    IMPRESSION:  1.  No acute pathology in the chest, abdomen, or pelvis.  2.  Bladder lesion seen on MRI 1/8/2022 is not delineated.  3.  Hepatic cirrhosis.    < end of copied text >    < from: CT Angio Neck w/ IV Cont (08.31.23 @ 14:40) >    IMPRESSION:    CTA COW:  Patentintracranial circulation without flow limiting stenosis   or large vessel occlusion.    CTA NECK: Patent, ECAs, ICAs, no  hemodynamically significant stenosis at    ICA origins by NASCET criteria.  Bilateral vertebral arteries are patent without flowlimiting stenosis.    < end of copied text >    < from: CT Head No Cont (08.31.23 @ 05:35) >    IMPRESSION:  No acute intracranial hemorrhage or mass effect.    1.8 x 1.9 x 1.8 cm dural based mass along the straight sinus above the   cerebellar vermis, most suggestive of a meningioma. Correlate with   nonemergent brain MRI.    < end of copied text >

## 2023-08-31 NOTE — PATIENT PROFILE ADULT - VISION (WITH CORRECTIVE LENSES IF THE PATIENT USUALLY WEARS THEM):
Telephone Encounter by Raman Bradley RN at 04/02/18 08:34 AM     Author:  Raman Bradley RN Service:  (none) Author Type:  Registered Nurse     Filed:  04/02/18 08:48 AM Encounter Date:  4/2/2018 Status:  Signed     :  Raman Bradley RN (Registered Nurse)            See original message by PSR. Spoke with patient, she was [place] of state over the weekend on vacation but went to ER due to symptoms she was having. Patient was discharged and instructed to see cardiology specialist. Patient is still having symptoms. Per patient, she feels like her \"Heart is beating weird\". Patient Feels winded. Denies SOB. Denies chest pain at this time. Notes Some pressure due to feeling like heart is trying to keep up, almost like double beats. Patient denies any radiating feelings.     To Abel Jean-Baptiste - Okay to place cardiology order?[JG1.1M]       Revision History        User Key Date/Time User Provider Type Action    > JG1.1 04/02/18 08:48 AM Raman Bradley RN Registered Nurse Sign    M - Manual             Normal vision: sees adequately in most situations; can see medication labels, newsprint

## 2023-08-31 NOTE — ED PROVIDER NOTE - ATTENDING CONTRIBUTION TO CARE
67-year-old transferred secondary to approximate 1.8 x 1.9 x 1.8 centimeter lesion found overlying the cerebellum.  Findings initially read by radiology as concern for meningioma.  Patient has dizziness with movement.  Patient has history of bladder polyps no history of cancer.  History of nonalcoholic autoimmune cirrhosis.  Normocephalic/atraumatic, trachea midline, beating nystagmus to the right is not fatigable and does not extinguish, questionable vertical nystagmus on confrontation with covering of the eye, no deformity extremities, intact strength, nontachycardic, nontachypneic,  Fran Vora MD, FACEP: In this physician's medical judgement based on clinical history and physical exam the patient's signs and symptoms lead to differential diagnoses which includes but is not limited to: Mass lesion compressing the cerebellum/occipital region, metastatic lesion, meningioma, cerebellar infarct    Historical features, symptoms, and clinical exam not consistent with: ACS    Labs were ordered and independently reviewed by me.  EKG was ordered and independently reviewed by me.  Imaging was ordered and reviewed by me.    Appropriate medications for the patient's presenting complaints were ordered, and effects were reassessed.    Patient's records including prior hospital visit, med and medical history were reviewed.  History assisted by EMS  Escalation to admission/observation was considered.    Will follow up on labs, therapeutics, imaging, reassess and disposition as clinically indicated.  *The above represents an initial assessment/impression. Please refer to my progress notes below for potential changes in patient clinical course*

## 2023-08-31 NOTE — ED ADULT NURSE NOTE - OBJECTIVE STATEMENT
pt axo3, c/o vertigo/dizziness/nausea/dry heaving since yesterday at 11:30 am. pt denies chest pain or SOB. safety maintained.

## 2023-08-31 NOTE — H&P ADULT - HISTORY OF PRESENT ILLNESS
68yo F with PMH of autoimmune hepatitis on azathioprine, cirrhosis, bladder lesion (unknown dx, pending eval)  presents as transfer from [place] for eval of brain mass suspected meningioma . Presented to ED yesterday for diplopia, dizziness, nausea/vomiting that started 11am 8/30. Sxs onset rapid, mild assoc HA but denies any similar sxs prior to this. No CP, SOB, abd pain, parethesias/weakness. Per patient 'diplopia' feels both like seeing 2 item/can't focus. At  was given IVF, zofran, benzo - found to have possible meningioma on CT scan so sent for Neurosx eval.    Patient reports she feels the room spinning, and unsteadiness while walking. Minor relief with medications received in the ER. She recently had a cystoscopic bladder mass removed which was said to be "cancerous," and was told she has a few more present she is scheduled to remove on Tuesday. She reports the vertigo onset started on 8/30 at around 11am. She denies any fevers, chills, neck stiffness. No CP, SOB, leg pain or swelling, paresthesias.     ED course: Hemodynamically table, received reglan, diazepam and meclizine.

## 2023-08-31 NOTE — ED PROVIDER NOTE - PHYSICAL EXAMINATION
CONSTITUTIONAL: patient lying in stretcher with eyes closed, able to move to ED stretcher w/o assistance but unable to sit up w/o eliciting sxs.   SKIN: Warm dry  HEAD: NCAT  EYES: nystagmus at rest, worse horizontal nystagmus when looking to R. pupils equal and reactive   ENT: MMM  NECK: Supple  CARD: RRR  RESP: CTAB  ABD: S/NT no R/G  EXT: no edema  NEURO: Grossly non-focal, no drift, strength intact but patient becomes very dizzy/vertiginous/nausea when trying to focus. Did not attempt to walk/finger-to-nose while symptomatic.   PSYCH: Cooperative

## 2023-08-31 NOTE — H&P ADULT - PROBLEM SELECTOR PLAN 5
Diet: Regular once passes dyspahgia screen  DVT PPx: Lovenox 30 qd since poor ambulation due to vertigo  Full CODE  DIspo: s/p MR and nsg evaluation, vertigo improvement/treatment, pending PT/OT evaluation

## 2023-08-31 NOTE — CONSULT NOTE ADULT - TIME BILLING
natural history of meningiomas discussed, risks and benefits of surgery discussed, risks and benefits of gamma knife radiosurgery discussed

## 2023-08-31 NOTE — ED ADULT TRIAGE NOTE - CHIEF COMPLAINT QUOTE
pt. c/o headache, dizziness , double vision since yesterday 11.30 am , denies any numbness or weakness

## 2023-08-31 NOTE — ED PROVIDER NOTE - PROGRESS NOTE DETAILS
Mohit Florian, PGY3 This patient was signed out to me.  Patient was seen by neurosurgery and neurology at bedside.  Neurosurgery would like to have an MRI.  Patient remains to be symptomatic when she tries to sit up.   neurology recommends symptomatic management.

## 2023-08-31 NOTE — ED PROVIDER NOTE - OBJECTIVE STATEMENT
68yo F with PMH of autoimmune hepatitis on azathioprine, cirrhosis, ?bladder lesion (unknown dx, pending eval)  presents as transfer from [place] for eval of brain mass suspected meningioma . Presented to ED yesterday for diplopia, dizziness, nausea/vomiting that started 11am 8/30. Sxs onset rapid, mild assoc HA but denies any similar sxs prior to this. No CP, SOB, abd pain, parethesias/weakness. Per patient 'diplopia' feels both like seeing 2 item/can't focus. At  was given IVF, zofran, benzo - found to have possible meningioma on CT scan so sent for Neurosx eval.

## 2023-08-31 NOTE — CONSULT NOTE ADULT - SUBJECTIVE AND OBJECTIVE BOX
p (1480)     HPI: 67F xfer GC h/o autoimmune hepatitis/cirrhosis on Azathioprine, bladder lesion s/p cysto (dx pend) p/f sudden onset mild frontal HA, double vision, vertigo, nausea/dry heave since yday @11AM. Has had an episode of vertigo months ago that self-resolved. CTH w/ midline tentorial mass along straight sinus superior to vermis, c/f mening. No AC/AP.     Exam: AOx3, no drift, PERRL. VFF. Horizontal nystagmus on R gaze, no facial, HSIEH 5/5, SILT, no dysmetria bilaterally. She reports worsening vertigo on head turn.         --Anticoagulation:    =====================  PAST MEDICAL HISTORY   Other specified disorders of bladder    Autoimmune liver disease    Melanoma    Bladder tumor    Obesity    H/O osteoporosis    History of cirrhosis    Urothelial cancer      PAST SURGICAL HISTORY   H/O gastric bypass    H/O cystoscopy    H/O endoscopy      No Known Allergies      MEDICATIONS:  Antibiotics:    Neuro:    Other:      SOCIAL HISTORY:   Occupation:   Marital Status:     FAMILY HISTORY:  FH: type 2 diabetes (Sibling)        ROS: Negative except per HPI    LABS:                          11.6   4.42  )-----------( 184      ( 31 Aug 2023 05:15 )             36.0     08-31    141  |  107  |  15  ----------------------------<  128<H>  3.7   |  25  |  0.54    Ca    9.3      31 Aug 2023 05:15    TPro  6.9  /  Alb  3.3  /  TBili  0.4  /  DBili  x   /  AST  22  /  ALT  18  /  AlkPhos  117  08-31

## 2023-08-31 NOTE — H&P ADULT - ASSESSMENT
66yo F with PMH of autoimmune hepatitis on azathioprine, cirrhosis, bladder lesion said to be cancerous, pending removal of further bladder lesions cystoscopically presents with acute vertigo and unsteadiness found to have CT head concerning for meningioma, admitted for nonemergent MRI head and neurosurgical evaluation.

## 2023-08-31 NOTE — CONSULT NOTE ADULT - ASSESSMENT
67F xfer GC h/o autoimmune hepatitis/cirrhosis on Azathioprine, bladder lesion s/p cysto (dx pend) p/f sudden onset mild frontal HA, double vision, vertigo, nausea/dry heave since yday @11AM. Has had an episode of vertigo months ago that self-resolved. CTH w/ midline tentorial mass along straight sinus superior to vermis, c/f mening. No AC/AP. Exam: AOx3, no drift, PERRL. VFF. Horizontal nystagmus on R gaze, no facial, HSIEH 5/5, SILT, no dysmetria bilaterally. She reports worsening vertigo on head turn.   - Adm CDU vs. med for MRI stereo w/wo + MRV (will discuss with Dr. Emery if MRI can be done outpatient)   - Zofran PRN/nausea sx mgmt per primary  - Vertigo is likely peripheral in etiology with incidental meningioma on CTH. Would rec outpatient f/u for poss surgery if indicated  - Neurology consult to better eval etiology of periph vertigo 67F xfer GC h/o autoimmune hepatitis/cirrhosis on Azathioprine, bladder lesion s/p cysto (dx pend) p/f sudden onset mild frontal HA, double vision, vertigo, nausea/dry heave since yday @11AM. Has had an episode of vertigo months ago that self-resolved. CTH w/ midline tentorial mass along straight sinus superior to vermis, c/f mening. No AC/AP. Exam: AOx3, no drift, PERRL. VFF. Horizontal nystagmus on R gaze, no facial, HSIEH 5/5, SILT, no dysmetria bilaterally. She reports worsening vertigo on head turn.   - Adm CDU vs. med for MRI stereo w/wo + MRV   - Zofran PRN/nausea sx mgmt per primary  - Vertigo is likely peripheral in etiology with incidental meningioma on CTH. Would rec outpatient f/u for poss surgery if indicated  - Neurology consult to better eval etiology of vertigo   - CT CAP w/ IVC given bladder lesion 67F xfer GC h/o autoimmune hepatitis/cirrhosis on Azathioprine, bladder lesion s/p cysto (dx pend) p/f sudden onset mild frontal HA, double vision, vertigo, nausea/dry heave since yday @11AM. Has had an episode of vertigo months ago that self-resolved. CTH w/ midline tentorial mass along straight sinus superior to vermis, c/f mening. No AC/AP. Exam: AOx3, no drift, PERRL. VFF. Horizontal nystagmus on R gaze, no facial, HSIEH 5/5, SILT, no dysmetria bilaterally. She reports worsening vertigo on head turn.   - Adm CDU vs. med for MRI stereo w/wo + MRV   - Zofran PRN/nausea sx mgmt per primary  - Vertigo is likely peripheral in etiology with incidental meningioma on CTH. Would rec outpatient f/u for poss GKRS if indicated   - Neurology consult to better eval etiology of vertigo  67F xfer GC h/o autoimmune hepatitis/cirrhosis on Azathioprine, bladder lesion s/p cysto (dx pend) p/f sudden onset mild frontal HA, double vision, vertigo, nausea/dry heave since yday @11AM. Has had an episode of vertigo months ago that self-resolved. CTH w/ midline tentorial mass along straight sinus superior to vermis, c/f mening. No AC/AP. Exam: AOx3, no drift, PERRL. VFF. Horizontal nystagmus on R gaze, no facial, HSIEH 5/5, SILT, no dysmetria bilaterally. She reports worsening vertigo on head turn.   - Adm CDU vs. med for MRI stereo w/wo + MRV   - Zofran PRN/nausea sx mgmt per primary  - Vertigo is likely peripheral in etiology with incidental meningioma on CTH. Would rec outpatient f/u with neurosurgery for management of meningioma  - Neurology consult to better eval etiology of vertigo

## 2023-08-31 NOTE — H&P ADULT - NSHPREVIEWOFSYSTEMS_GEN_ALL_CORE
CONSTITUTIONAL:  No weight loss, fever, chills, weakness or fatigue.  HEENT:  Eyes:  No visual loss, blurred vision, double vision or yellow sclerae. Ears, Nose, Throat:  No hearing loss, sneezing, congestion, runny nose or sore throat.  SKIN:  No rash or itching.  CARDIOVASCULAR:  No chest pain, chest pressure or chest discomfort. No palpitations.  RESPIRATORY:  No shortness of breath, cough or sputum.  GASTROINTESTINAL:  No anorexia, nausea, vomiting or diarrhea. No abdominal pain or blood.  GENITOURINARY:  Denies hematuria, dysuria.   NEUROLOGICAL:  + vetigo, room spinning, no headache or neck stiffnss  MUSCULOSKELETAL:  No muscle, back pain, joint pain or stiffness.  HEMATOLOGIC:  No anemia, bleeding or bruising.  LYMPHATICS:  No enlarged nodes.   PSYCHIATRIC:  No history of depression or anxiety.  ENDOCRINOLOGIC:  No reports of sweating, cold or heat intolerance. No polyuria or polydipsia.  ALLERGIES:  No history of asthma, hives, eczema or rhinitis.

## 2023-09-01 ENCOUNTER — TRANSCRIPTION ENCOUNTER (OUTPATIENT)
Age: 68
End: 2023-09-01

## 2023-09-01 VITALS
OXYGEN SATURATION: 98 % | HEART RATE: 73 BPM | RESPIRATION RATE: 18 BRPM | DIASTOLIC BLOOD PRESSURE: 61 MMHG | SYSTOLIC BLOOD PRESSURE: 102 MMHG | TEMPERATURE: 98 F

## 2023-09-01 LAB
A1C WITH ESTIMATED AVERAGE GLUCOSE RESULT: 5.6 % — SIGNIFICANT CHANGE UP (ref 4–5.6)
ALBUMIN SERPL ELPH-MCNC: 3.4 G/DL — SIGNIFICANT CHANGE UP (ref 3.3–5)
ALP SERPL-CCNC: 96 U/L — SIGNIFICANT CHANGE UP (ref 40–120)
ALT FLD-CCNC: 7 U/L — LOW (ref 10–45)
ANION GAP SERPL CALC-SCNC: 11 MMOL/L — SIGNIFICANT CHANGE UP (ref 5–17)
ANISOCYTOSIS BLD QL: SLIGHT — SIGNIFICANT CHANGE UP
APTT BLD: 28.9 SEC — SIGNIFICANT CHANGE UP (ref 24.5–35.6)
AST SERPL-CCNC: 13 U/L — SIGNIFICANT CHANGE UP (ref 10–40)
BASOPHILS # BLD AUTO: 0.03 K/UL — SIGNIFICANT CHANGE UP (ref 0–0.2)
BASOPHILS NFR BLD AUTO: 0.9 % — SIGNIFICANT CHANGE UP (ref 0–2)
BILIRUB SERPL-MCNC: 0.3 MG/DL — SIGNIFICANT CHANGE UP (ref 0.2–1.2)
BUN SERPL-MCNC: 16 MG/DL — SIGNIFICANT CHANGE UP (ref 7–23)
CALCIUM SERPL-MCNC: 8.8 MG/DL — SIGNIFICANT CHANGE UP (ref 8.4–10.5)
CHLORIDE SERPL-SCNC: 107 MMOL/L — SIGNIFICANT CHANGE UP (ref 96–108)
CHOLEST SERPL-MCNC: 163 MG/DL — SIGNIFICANT CHANGE UP
CO2 SERPL-SCNC: 23 MMOL/L — SIGNIFICANT CHANGE UP (ref 22–31)
CREAT SERPL-MCNC: 0.5 MG/DL — SIGNIFICANT CHANGE UP (ref 0.5–1.3)
DACRYOCYTES BLD QL SMEAR: SLIGHT — SIGNIFICANT CHANGE UP
EGFR: 102 ML/MIN/1.73M2 — SIGNIFICANT CHANGE UP
EOSINOPHIL # BLD AUTO: 0.03 K/UL — SIGNIFICANT CHANGE UP (ref 0–0.5)
EOSINOPHIL NFR BLD AUTO: 0.9 % — SIGNIFICANT CHANGE UP (ref 0–6)
ESTIMATED AVERAGE GLUCOSE: 114 MG/DL — SIGNIFICANT CHANGE UP (ref 68–114)
GIANT PLATELETS BLD QL SMEAR: PRESENT — SIGNIFICANT CHANGE UP
GLUCOSE SERPL-MCNC: 89 MG/DL — SIGNIFICANT CHANGE UP (ref 70–99)
HCT VFR BLD CALC: 31.3 % — LOW (ref 34.5–45)
HCV AB S/CO SERPL IA: 0.06 S/CO — SIGNIFICANT CHANGE UP (ref 0–0.99)
HCV AB SERPL-IMP: SIGNIFICANT CHANGE UP
HDLC SERPL-MCNC: 74 MG/DL — SIGNIFICANT CHANGE UP
HGB BLD-MCNC: 10.4 G/DL — LOW (ref 11.5–15.5)
INR BLD: 1.01 RATIO — SIGNIFICANT CHANGE UP (ref 0.85–1.18)
LIPID PNL WITH DIRECT LDL SERPL: 80 MG/DL — SIGNIFICANT CHANGE UP
LYMPHOCYTES # BLD AUTO: 1.38 K/UL — SIGNIFICANT CHANGE UP (ref 1–3.3)
LYMPHOCYTES # BLD AUTO: 38.9 % — SIGNIFICANT CHANGE UP (ref 13–44)
MACROCYTES BLD QL: SLIGHT — SIGNIFICANT CHANGE UP
MANUAL SMEAR VERIFICATION: SIGNIFICANT CHANGE UP
MCHC RBC-ENTMCNC: 29.4 PG — SIGNIFICANT CHANGE UP (ref 27–34)
MCHC RBC-ENTMCNC: 33.2 GM/DL — SIGNIFICANT CHANGE UP (ref 32–36)
MCV RBC AUTO: 88.4 FL — SIGNIFICANT CHANGE UP (ref 80–100)
MONOCYTES # BLD AUTO: 0.25 K/UL — SIGNIFICANT CHANGE UP (ref 0–0.9)
MONOCYTES NFR BLD AUTO: 7.1 % — SIGNIFICANT CHANGE UP (ref 2–14)
NEUTROPHILS # BLD AUTO: 1.82 K/UL — SIGNIFICANT CHANGE UP (ref 1.8–7.4)
NEUTROPHILS NFR BLD AUTO: 51.3 % — SIGNIFICANT CHANGE UP (ref 43–77)
NON HDL CHOLESTEROL: 89 MG/DL — SIGNIFICANT CHANGE UP
OVALOCYTES BLD QL SMEAR: SLIGHT — SIGNIFICANT CHANGE UP
PLAT MORPH BLD: ABNORMAL
PLATELET # BLD AUTO: 151 K/UL — SIGNIFICANT CHANGE UP (ref 150–400)
POIKILOCYTOSIS BLD QL AUTO: SLIGHT — SIGNIFICANT CHANGE UP
POTASSIUM SERPL-MCNC: 3.7 MMOL/L — SIGNIFICANT CHANGE UP (ref 3.5–5.3)
POTASSIUM SERPL-SCNC: 3.7 MMOL/L — SIGNIFICANT CHANGE UP (ref 3.5–5.3)
PROT SERPL-MCNC: 5.7 G/DL — LOW (ref 6–8.3)
PROTHROM AB SERPL-ACNC: 10.6 SEC — SIGNIFICANT CHANGE UP (ref 9.5–13)
RBC # BLD: 3.54 M/UL — LOW (ref 3.8–5.2)
RBC # FLD: 14.2 % — SIGNIFICANT CHANGE UP (ref 10.3–14.5)
RBC BLD AUTO: ABNORMAL
SODIUM SERPL-SCNC: 141 MMOL/L — SIGNIFICANT CHANGE UP (ref 135–145)
TRIGL SERPL-MCNC: 39 MG/DL — SIGNIFICANT CHANGE UP
VARIANT LYMPHS # BLD: 0.9 % — SIGNIFICANT CHANGE UP (ref 0–6)
WBC # BLD: 3.54 K/UL — LOW (ref 3.8–10.5)
WBC # FLD AUTO: 3.54 K/UL — LOW (ref 3.8–10.5)

## 2023-09-01 PROCEDURE — 99239 HOSP IP/OBS DSCHRG MGMT >30: CPT

## 2023-09-01 PROCEDURE — 86803 HEPATITIS C AB TEST: CPT

## 2023-09-01 PROCEDURE — 83036 HEMOGLOBIN GLYCOSYLATED A1C: CPT

## 2023-09-01 PROCEDURE — 85730 THROMBOPLASTIN TIME PARTIAL: CPT

## 2023-09-01 PROCEDURE — 85025 COMPLETE CBC W/AUTO DIFF WBC: CPT

## 2023-09-01 PROCEDURE — 80061 LIPID PANEL: CPT

## 2023-09-01 PROCEDURE — 96375 TX/PRO/DX INJ NEW DRUG ADDON: CPT

## 2023-09-01 PROCEDURE — 97165 OT EVAL LOW COMPLEX 30 MIN: CPT

## 2023-09-01 PROCEDURE — A9585: CPT

## 2023-09-01 PROCEDURE — 86900 BLOOD TYPING SEROLOGIC ABO: CPT

## 2023-09-01 PROCEDURE — 71045 X-RAY EXAM CHEST 1 VIEW: CPT

## 2023-09-01 PROCEDURE — 70498 CT ANGIOGRAPHY NECK: CPT | Mod: MA

## 2023-09-01 PROCEDURE — 70553 MRI BRAIN STEM W/O & W/DYE: CPT | Mod: MA

## 2023-09-01 PROCEDURE — 99233 SBSQ HOSP IP/OBS HIGH 50: CPT

## 2023-09-01 PROCEDURE — 99285 EMERGENCY DEPT VISIT HI MDM: CPT

## 2023-09-01 PROCEDURE — 86901 BLOOD TYPING SEROLOGIC RH(D): CPT

## 2023-09-01 PROCEDURE — 71260 CT THORAX DX C+: CPT | Mod: MA

## 2023-09-01 PROCEDURE — 97161 PT EVAL LOW COMPLEX 20 MIN: CPT

## 2023-09-01 PROCEDURE — 70545 MR ANGIOGRAPHY HEAD W/DYE: CPT | Mod: MA

## 2023-09-01 PROCEDURE — 96374 THER/PROPH/DIAG INJ IV PUSH: CPT

## 2023-09-01 PROCEDURE — 86850 RBC ANTIBODY SCREEN: CPT

## 2023-09-01 PROCEDURE — 36415 COLL VENOUS BLD VENIPUNCTURE: CPT

## 2023-09-01 PROCEDURE — 70496 CT ANGIOGRAPHY HEAD: CPT | Mod: MA

## 2023-09-01 PROCEDURE — 74177 CT ABD & PELVIS W/CONTRAST: CPT | Mod: MA

## 2023-09-01 PROCEDURE — 80053 COMPREHEN METABOLIC PANEL: CPT

## 2023-09-01 PROCEDURE — 85610 PROTHROMBIN TIME: CPT

## 2023-09-01 RX ORDER — METOCLOPRAMIDE HCL 10 MG
1 TABLET ORAL
Qty: 90 | Refills: 0
Start: 2023-09-01 | End: 2023-09-30

## 2023-09-01 RX ORDER — SODIUM CHLORIDE 9 MG/ML
500 INJECTION, SOLUTION INTRAVENOUS
Refills: 0 | Status: DISCONTINUED | OUTPATIENT
Start: 2023-09-01 | End: 2023-09-01

## 2023-09-01 RX ORDER — MECLIZINE HCL 12.5 MG
1 TABLET ORAL
Qty: 90 | Refills: 0
Start: 2023-09-01 | End: 2023-09-30

## 2023-09-01 RX ADMIN — AZATHIOPRINE 50 MILLIGRAM(S): 100 TABLET ORAL at 05:19

## 2023-09-01 RX ADMIN — Medication 25 MILLIGRAM(S): at 05:19

## 2023-09-01 RX ADMIN — SODIUM CHLORIDE 100 MILLILITER(S): 9 INJECTION, SOLUTION INTRAVENOUS at 01:54

## 2023-09-01 RX ADMIN — ENOXAPARIN SODIUM 30 MILLIGRAM(S): 100 INJECTION SUBCUTANEOUS at 05:18

## 2023-09-01 NOTE — DISCHARGE NOTE PROVIDER - NSDCFUSCHEDAPPT_GEN_ALL_CORE_FT
Byron Donovan Physician [facility]  UROLOGY [address] [facility]  Scheduled Appointment: 09/05/2023    Byron Donovan  [facility]  [facility]  Scheduled Appointment: 09/05/2023    Francisco J Emery Physician [facility]  NEUROSURG [address] [place] [address]  Scheduled Appointment: 09/12/2023     Francisco J Emery  [facility]  NEUROSURG [address] [facility]  Scheduled Appointment: 09/12/2023    Dk Skinner  [facility]  NEUROLOGY [address] [facility]  Scheduled Appointment: 09/14/2023

## 2023-09-01 NOTE — PROGRESS NOTE ADULT - ASSESSMENT
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 Per patient, was feeling fine until yesterday morning 1130 when she started having headaches 5/10 as well as "room spinning" sensation worse with head movements as well as double vision. Describes double vision as "can't focus, I keep seeing 2." Also endorses some nausea, has not vomited although she has been dry heaving.  States she had similar sensation with vertigo a few months back but resolved on its own without intervention.   CTH 8/31: No acute intracranial hemorrhage or mass effect. 1.8 x 1.9 x 1.8 cm dural based mass  CTA 8/31: no  hemodynamically significant stenosis  MRI brain W/W/O 8/31: Meningioma 2 cm distorts the straight sinus. Cerebellar remote infarctions. No evidence of dural sinus thrombosis.  Right posterior mastoid lesion is indeterminate.     IMPRESSION: Resolved 1 day of acute onset headaches, vertigo,  double vision likely related to recrudescent of chronic cerebellar infarcts. Meningioma seen on MRI Brain. No acute infarct or acute intracranial pathology on MRI brain.    Recommendations:  [] Symptomatic management w/ IVF, Meclizine 50mg BID prn, Reglan 10mg QID  [] continue follow up with neurosurgery for meningioma management  [] for secondary stroke prevention, would recommend aspirin 81mg and statin (goal LDL <70), however would need hepatology specialty clearance to start given hx autoimmune hepatitis. Patient follows with Dr. Stephany Rolon, phone number [phone]  [] PT/OT   [] Right posterior mastoid lesion workup/management and rest of care per primary team  [] No further inpatient neurology recommendation, will sign off, please call consult service [phone] with any questions.  [] Patient can follow up with stroke neurologist at [address] [place] [address] 1-2 weeks after discharge by calling [phone] to schedule this appointment    Plans discussed with neurology attending, Dr. Peralta
68yo F with PMH of autoimmune hepatitis on azathioprine, cirrhosis, bladder lesion said to be cancerous, pending removal of further bladder lesions cystoscopically presents with acute vertigo and unsteadiness found to have CT head concerning for meningioma, admitted for nonemergent MRI head and neurosurgical evaluation.

## 2023-09-01 NOTE — DISCHARGE NOTE NURSING/CASE MANAGEMENT/SOCIAL WORK - NSDCFUADDAPPT_GEN_ALL_CORE_FT
APPTS ARE READY TO BE MADE: [ X ] YES    Best Family or Patient Contact (if needed):    Additional Information about above appointments (if needed):  You will need to follow-up with your hepatologist for repeat endoscopy.     1: Neurosurgery  2: Neurology (Stroke neurology)  3: Hepatology  4. Primary care provider    Other comments or requests:   You will need to follow-up with your hepatologist for repeat endoscopy.

## 2023-09-01 NOTE — DISCHARGE NOTE PROVIDER - PROVIDER TOKENS
PROVIDER:[TOKEN:[[id number]],FOLLOWUP:[1 week]],PROVIDER:[TOKEN:[[id number]],FOLLOWUP:[2 weeks],ESTABLISHEDPATIENT:[T]]

## 2023-09-01 NOTE — DISCHARGE NOTE PROVIDER - CARE PROVIDER_API CALL
Francisco J Emery  Neurosurgery  [address], [address]  [place] [address]  Phone: [phone]  Fax: [phone]  Follow Up Time: 1 week    Stephany Rolon  Transplant Hepatology  [address] [place] [address]  Phone: [phone]  Fax: [phone]  Established Patient  Follow Up Time: 2 weeks

## 2023-09-01 NOTE — DISCHARGE NOTE PROVIDER - NSDCCPCAREPLAN_GEN_ALL_CORE_FT
PRINCIPAL DISCHARGE DIAGNOSIS  Diagnosis: Vertigo  Assessment and Plan of Treatment: You presented to the hospital with symptoms including dizziness, headache, nausea, and changes to your vision. You described your dizziness as a "room-spinning" sensation which is consistent with vertigo. You were evaluated by the neurology team, and it appears that the cause of your vertigo is most likely benign positional paroxysmal vertigo (BPPV), which can occur when there are disturbances in the inner ear. Your symptoms resolved after treatment with Meclizine and Reglan.  You should follow up with the neurologist to discuss these symptoms. If you continue to experience dizziness or nausea, you should take the Meclizine and Reglan as needed. If your symptoms do not improve with these medications, call your primary doctor or consider returning to the emergency department.      SECONDARY DISCHARGE DIAGNOSES  Diagnosis: Meningioma  Assessment and Plan of Treatment: While in the hospital you were found to incidentally have a meningioma on CT and MRI of the head. Meningiomas are tumors of the brain, with the majority of meningiomas being benign. The meningioma is unlikely to be the cause of your symptoms. You were evaluated by the neurosurgery team for the meningioma and it was determined that surgery or other interventions were not necessary at this time.  You should follow-up with the neurosurgeon as an outpatient to discuss your options for management of the meningioma.    Diagnosis: Cerebellar infarction  Assessment and Plan of Treatment:   You can follow up with neurology as an outpatient to further discuss these imaging findings. If you experience any new or worsening symptoms, including extremity numbness or weakness or facial droop, you should call your primary doctor or return to the emergency department immediately.     PRINCIPAL DISCHARGE DIAGNOSIS  Diagnosis: Vertigo  Assessment and Plan of Treatment: You presented to the hospital with symptoms including dizziness, headache, nausea, and changes to your vision. You described your dizziness as a "room-spinning" sensation which is consistent with vertigo. You were evaluated by the neurology team, and it appears that the cause of your vertigo is most likely benign positional paroxysmal vertigo (BPPV), which can occur when there are disturbances in the inner ear. Your symptoms resolved after treatment with Meclizine and Reglan.  You should follow up with the neurologist to discuss these symptoms. If you continue to experience dizziness or nausea, you should take the Meclizine and Reglan as needed. If your symptoms do not improve with these medications, call your primary doctor or consider returning to the emergency department.      SECONDARY DISCHARGE DIAGNOSES  Diagnosis: Meningioma  Assessment and Plan of Treatment: While in the hospital you were found to incidentally have a meningioma on CT and MRI of the head. Meningiomas are tumors of the brain, with the majority of meningiomas being benign. The meningioma is unlikely to be the cause of your symptoms. You were evaluated by the neurosurgery team for the meningioma and it was determined that surgery or other interventions were not necessary at this time.  You should follow-up with the neurosurgeon as an outpatient to discuss your options for management of the meningioma.    Diagnosis: Cerebellar infarction  Assessment and Plan of Treatment:   You can follow up with neurology as an outpatient to further discuss these imaging findings. You should discuss starting aspirin and statin with your hepatologist. If you experience any new or worsening symptoms, including extremity numbness or weakness or facial droop, you should call your primary doctor or return to the emergency department immediately.     PRINCIPAL DISCHARGE DIAGNOSIS  Diagnosis: Vertigo  Assessment and Plan of Treatment: You presented to the hospital with symptoms including dizziness, headache, nausea, and changes to your vision. You described your dizziness as a "room-spinning" sensation which is consistent with vertigo. You were evaluated by the neurology team, and it appears that the cause of your vertigo is most likely benign positional paroxysmal vertigo (BPPV), which can occur when there are disturbances in the inner ear. Your symptoms resolved after treatment with Meclizine and Reglan.  You should follow up with the neurologist to discuss these symptoms. If you continue to experience dizziness or nausea, you should take the Meclizine and Reglan as needed. If your symptoms do not improve with these medications, call your primary doctor or consider returning to the emergency department.      SECONDARY DISCHARGE DIAGNOSES  Diagnosis: Meningioma  Assessment and Plan of Treatment: While in the hospital you were found to incidentally have a meningioma on CT and MRI of the head. Meningiomas are tumors of the brain, with the majority of meningiomas being benign. The meningioma is unlikely to be the cause of your symptoms. You were evaluated by the neurosurgery team for the meningioma and it was determined that surgery or other interventions were not necessary at this time.  You should follow-up with the neurosurgeon as an outpatient to discuss your options for management of the meningioma.    Diagnosis: Cerebellar infarction  Assessment and Plan of Treatment: You can follow up with neurology as an outpatient to further discuss these imaging findings. You will need to follow-up with your hepatologist for repeat endoscopy, and to discus starting aspirin and statin. If you experience any new or worsening symptoms, including extremity numbness or weakness or facial droop, you should call your primary doctor or return to the emergency department immediately.     PRINCIPAL DISCHARGE DIAGNOSIS  Diagnosis: Vertigo  Assessment and Plan of Treatment: You presented to the hospital with symptoms including dizziness, headache, nausea, and changes to your vision. You described your dizziness as a "room-spinning" sensation which is consistent with vertigo. You were evaluated by the neurology team, and it appears that the cause of your vertigo is most likely benign positional paroxysmal vertigo (BPPV), which can occur when there are disturbances in the inner ear. Your symptoms resolved after treatment with Meclizine and Reglan.  You should follow up with the neurologist to discuss these symptoms. If you continue to experience dizziness or nausea, you should take the Meclizine and Reglan as needed. If your symptoms do not improve with these medications, call your primary doctor or consider returning to the emergency department.      SECONDARY DISCHARGE DIAGNOSES  Diagnosis: Meningioma  Assessment and Plan of Treatment: While in the hospital you were found to incidentally have a meningioma on CT and MRI of the head. Meningiomas are tumors of the brain, with the majority of meningiomas being benign. The meningioma is unlikely to be the cause of your symptoms. You were evaluated by the neurosurgery team for the meningioma and it was determined that surgery or other interventions were not necessary at this time.  You should follow-up with the neurosurgeon as an outpatient to discuss your options for management of the meningioma.    Diagnosis: Autoimmune liver disease  Assessment and Plan of Treatment: Please follow up with [facility] hepatology team after discharge to evaluate if a statin is a good medication for you. You are also due for a repeat endoscopy.    Diagnosis: Cerebellar infarction  Assessment and Plan of Treatment: You can follow up with neurology as an outpatient to further discuss these imaging findings. You will need to follow-up with your hepatologist for repeat endoscopy, and to discus starting aspirin and statin. If you experience any new or worsening symptoms, including extremity numbness or weakness or facial droop, you should call your primary doctor or return to the emergency department immediately.

## 2023-09-01 NOTE — DISCHARGE NOTE PROVIDER - NSDCMRMEDTOKEN_GEN_ALL_CORE_FT
azaTHIOprine 50 mg oral tablet: 2 tab(s) orally once a day  meclizine 25 mg oral tablet: 1 tab(s) orally every 8 hours as needed for  dizziness  Reglan 5 mg oral tablet: 1 tab(s) orally every 8 hours as needed for  nausea

## 2023-09-01 NOTE — DISCHARGE NOTE PROVIDER - CARE PROVIDERS DIRECT ADDRESSES
,DirectAddress_Unknown,[email or web address]

## 2023-09-01 NOTE — PROGRESS NOTE ADULT - SUBJECTIVE AND OBJECTIVE BOX
NEUROLOGY FOLLOW-UP CONSULT NOTE    RFC: vertigo    Interval history: No acute neurologic events overnight. Patient reports feeling well, denies any vertigo or dizziness.    Meds:  MEDICATIONS  (STANDING):  azaTHIOprine 50 milliGRAM(s) Oral two times a day  enoxaparin Injectable 30 milliGRAM(s) SubCutaneous every 24 hours  lactated ringers. 500 milliLiter(s) (100 mL/Hr) IV Continuous <Continuous>  meclizine 25 milliGRAM(s) Oral every 8 hours    MEDICATIONS  (PRN):  acetaminophen     Tablet .. 650 milliGRAM(s) Oral every 6 hours PRN Temp greater or equal to 38C (100.4F), Mild Pain (1 - 3)  melatonin 3 milliGRAM(s) Oral at bedtime PRN Insomnia  metoclopramide Injectable 5 milliGRAM(s) IV Push every 8 hours PRN nausea/vomiting      PMHx/PSHx/FHx/SHx:  Dizziness and giddiness    FH: type 2 diabetes (Sibling)    Other specified disorders of bladder    Autoimmune liver disease    Melanoma    Bladder tumor    Obesity    H/O osteoporosis    History of cirrhosis    Urothelial cancer    Dizziness    Autoimmune liver disease    Bladder tumor    Vertigo    Meningioma    H/O gastric bypass    H/O cystoscopy    H/O endoscopy    Visual changes    Diplopia        Allergies:  No Known Allergies      ROS: All systems negative except as documented in Interval history    O:  T(C): 36.6 (09-01-23 @ 04:41), Max: 37 (08-31-23 @ 17:45)  HR: 56 (09-01-23 @ 04:41) (56 - 67)  BP: 95/59 (09-01-23 @ 04:41) (90/51 - 120/69)  RR: 18 (09-01-23 @ 04:41) (16 - 18)  SpO2: 99% (09-01-23 @ 04:41) (97% - 100%)    Focused neurologic exam:  MS - AAO x3, speech fluent, rep/naming intact, follows commands, attn/conc/recent and remote memory/fund of knowledge WNL  CN - PERRLA, EOMI, VFF, face sens/str/hearing WNL b/l, tongue/palate midline, trap 5/5 b/l  Motor - Normal bulk/tone, 5/5 all  Sens - LT/temp/vib intact all  DTR's - 2+ all and downgoing b/l plantar response  Coord - FtN intact b/l  Gait and station - Not assessed due to fall risk    Pertinent labs/studies:    LABS:  cret                        10.4   3.54  )-----------( 151      ( 01 Sep 2023 06:47 )             31.3     09-01    141  |  107  |  16  ----------------------------<  89  3.7   |  23  |  0.50    Ca    8.8      01 Sep 2023 06:47    TPro  5.7<L>  /  Alb  3.4  /  TBili  0.3  /  DBili  x   /  AST  13  /  ALT  7<L>  /  AlkPhos  96  09-01    PT/INR - ( 01 Sep 2023 06:49 )   PT: 10.6 sec;   INR: 1.01 ratio         PTT - ( 01 Sep 2023 06:49 )  PTT:28.9 sec    < from: CT Head No Cont (08.31.23 @ 05:35) >  IMPRESSION:  No acute intracranial hemorrhage or mass effect.    1.8 x 1.9 x 1.8 cm dural based mass along the straight sinus above the   cerebellar vermis, most suggestive of a meningioma. Correlate with   nonemergent brain MRI.    < end of copied text >    < from: CT Angio Neck w/ IV Cont (08.31.23 @ 14:40) >  IMPRESSION:    CTA COW:  Patentintracranial circulation without flow limiting stenosis   or large vessel occlusion.    CTA NECK: Patent, ECAs, ICAs, no  hemodynamically significant stenosis at    ICA origins by NASCET criteria.  Bilateral vertebral arteries are patent without flowlimiting stenosis.    < end of copied text >      < from: MR Brain Stereotactic w/wo IV Cont (08.31.23 @ 21:04) >  IMPRESSION:    1.  MR BRAIN:   Meningioma 2 cm distorts the straight sinus. Cerebellar   remote infarctions.    2.  MRV brain:   No evidence of dural sinus thrombosis.    3. Right posterior mastoid lesion is indeterminate. This may reflect   chronic mastoiditis or other likely benign process    < end of copied text >

## 2023-09-01 NOTE — PHYSICAL THERAPY INITIAL EVALUATION ADULT - PERTINENT HX OF CURRENT PROBLEM, REHAB EVAL
68yo F with PMH of autoimmune hepatitis on azathioprine, cirrhosis, ?bladder lesion (unknown dx, pending eval)  presents as transfer from [facility] for eval of brain mass suspected meningioma . Presented to ED yesterday for diplopia, dizziness, nausea/vomiting that started 11am 8/30. Sxs onset rapid, mild assoc HA but denies any similar sxs prior to this. No CP, SOB, abd pain, parethesias/weakness. Per patient 'diplopia' feels both like seeing 2 item/can't focus. At  was given IVF, zofran, benzo - found to have possible meningioma on CT scan so sent for Neurosx eval. CT Chest 8/31: No acute pathology in the chest, abdomen, or pelvis.  Bladder lesion seen on MRI 1/8/2022 is not delineated. Hepatic cirrhosis. MRI Brain 8/31: MR BRAIN:   Meningioma 2 cm distorts the straight sinus. Cerebellar remote infarctions. MRV brain:   No evidence of dural sinus thrombosis. Right posterior mastoid lesion is indeterminate. This may reflect chronic mastoiditis or other likely benign process

## 2023-09-01 NOTE — OCCUPATIONAL THERAPY INITIAL EVALUATION ADULT - ADDITIONAL COMMENTS
Pt reports she lives with her spouse in a PH, 10 MARIO, no HR, First floor set up. PTA pt independent in all adl's and functional mobility, active, +, +shower stall with seat

## 2023-09-01 NOTE — DISCHARGE NOTE PROVIDER - NSDCFUADDAPPT_GEN_ALL_CORE_FT
APPTS ARE READY TO BE MADE: [ X ] YES    Best Family or Patient Contact (if needed):    Additional Information about above appointments (if needed):    1: Neurosurgery  2: Neurology  3: Hepatology  4. Primary care provider    Other comments or requests:    APPTS ARE READY TO BE MADE: [ X ] YES    Best Family or Patient Contact (if needed):    Additional Information about above appointments (if needed):  You will need to follow-up with your hepatologist for repeat endoscopy.     1: Neurosurgery  2: Neurology  3: Hepatology  4. Primary care provider    Other comments or requests:   You will need to follow-up with your hepatologist for repeat endoscopy.  APPTS ARE READY TO BE MADE: [ X ] YES    Best Family or Patient Contact (if needed):    Additional Information about above appointments (if needed):  You will need to follow-up with your hepatologist for repeat endoscopy.     1: Neurosurgery  2: Neurology (Stroke neurology)  3: Hepatology  4. Primary care provider    Other comments or requests:   You will need to follow-up with your hepatologist for repeat endoscopy.  APPTS ARE READY TO BE MADE: [ X ] YES    Best Family or Patient Contact (if needed):    Additional Information about above appointments (if needed):  You will need to follow-up with your hepatologist for repeat endoscopy.     1: Neurosurgery  2: Neurology (Stroke neurology)  3: Hepatology  4. Primary care provider    Other comments or requests:   You will need to follow-up with your hepatologist for repeat endoscopy.   Patient was previously scheduled for an appointment on 09/12 9:00a at [address] [facility], [address] [place] [address] with Dr. Emery  Patient was previously scheduled for an appointment on 09/14 4:20p at [address] [place] [address] with Dr. Skinner  Patient was previously scheduled for an appointment on 12/11 10:00a at [address] [place] [address] with Dr. Rolon

## 2023-09-01 NOTE — PROGRESS NOTE ADULT - PROBLEM SELECTOR PLAN 4
Stable on azathioprine 50 qd per   - c/w azathioprine inpatient  - avoid hepatotoxic medications Stable on azathioprine 50 BID per   - c/w azathioprine inpatient  - avoid hepatotoxic medications

## 2023-09-01 NOTE — DISCHARGE NOTE NURSING/CASE MANAGEMENT/SOCIAL WORK - PATIENT PORTAL LINK FT
You can access the FollowMyHealth Patient Portal offered by [facility] by registering at the following website: [email or web address]. By joining [employer] FollowMyHealth portal, you will also be able to view your health information using other applications (apps) compatible with our system.

## 2023-09-01 NOTE — OCCUPATIONAL THERAPY INITIAL EVALUATION ADULT - PERTINENT HX OF CURRENT PROBLEM, REHAB EVAL
66yo F with PMH of autoimmune hepatitis on azathioprine, cirrhosis, bladder lesion said to be cancerous, pending removal of further bladder lesions cystoscopically presents with acute vertigo and unsteadiness found to have CT head concerning for meningioma, admitted for nonemergent MRI head and neurosurgical evaluation. Possible meningioma seen on CT, though can be peripheral.  No nystagmus appreciated on exam. MR showing meningioma and remote cerebellar infarcts. Now asymptomatic with IVF, meclizine, and reglan

## 2023-09-01 NOTE — DISCHARGE NOTE PROVIDER - NSDCCPTREATMENT_GEN_ALL_CORE_FT
PRINCIPAL PROCEDURE  Procedure: MR brain  Findings and Treatment: IMPRESSION:  1.  MR BRAIN:   Meningioma 2 cm distorts the straight sinus. Cerebellar   remote infarctions.  2.  MRV brain:   No evidence of dural sinus thrombosis.  3. Right posterior mastoid lesion is indeterminate. This may reflect   chronic mastoiditis or other likely benign process        SECONDARY PROCEDURE  Procedure: CT abdomen pelvis  Findings and Treatment: IMPRESSION:  1.  No acute pathology in the chest, abdomen, or pelvis.  2.  Bladder lesion seen on MRI 1/8/2022 is not delineated.  3.  Hepatic cirrhosis.

## 2023-09-01 NOTE — PHYSICAL THERAPY INITIAL EVALUATION ADULT - ADDITIONAL COMMENTS
pt lives in private home with spouse. 10 steps to enter, first floor set up. Prior to admission, pt was I with all functional mobility and ADLs without AD. Pt works from home, +.

## 2023-09-01 NOTE — PROGRESS NOTE ADULT - ATTENDING COMMENTS
patient is a 67 year old female with past medical history of autoimmune hepatitis, cirrhosis, presented initially to  with acute onset headache, dizziness,  double vision, nausea, CT head showed likely posterior fossa meningioma at the superior cerebellar vermis, transferred to [facility] for further neurosurgery eval.     #dizziness suspected peripheral, bppv, rule out central, cardiac, toxic, infectious etiology, CVA  #meningoma, posterior fossa    - symptoms improving   - MRI brain done showing 2cm meningioma, cerebellar remote infarcts  - neuro recs secondary stroke prevention with aspirin 81mg and statin, however would need hepatology clearance  - meclizine for symptom control. reglan prn   - outpatient f/u for possible GKRS if indicated per neurosurgery   - f/u neuro and neurosurgery team for further recs  - neuro checks. fall precautions  - PT eval, outpatient vestibular therapy  - dvt ppx: lovenox subcu  - nonalcoholic autoimmune hepatitis: continue home med azathioprine.   - discharge planning 50mins pending neurosx clearance. patient will need close outpatient follow up.

## 2023-09-01 NOTE — PROGRESS NOTE ADULT - PROBLEM SELECTOR PLAN 5
Diet: Regular once passes dyspahgia screen  DVT PPx: Lovenox 30 qd since poor ambulation due to vertigo  Full CODE  DIspo: s/p MR and nsg evaluation, vertigo improvement/treatment, pending PT/OT evaluation Diet: Regular once passes dysphagia screen  DVT PPx: Lovenox 30 qd since poor ambulation due to vertigo  Full CODE  DIspo: s/p neuro, NSG and PT/OT evaluation

## 2023-09-01 NOTE — DISCHARGE NOTE PROVIDER - HOSPITAL COURSE
6yo F with PMH of autoimmune hepatitis on azathioprine, cirrhosis, bladder lesion presented as transfer from [facility] for evaluation of brain lesion suspected as meningioma. She initially presented to ED for dizziness, diplopia, nausea/vomiting that started 1 day prior. Her dizziness was described as "room-spinning" and was determined to be consistent with vertigo. She had no other symptoms at the time. While at OSH, CT Head revealed 2cm brain lesion suggestive of meningioma so she was transferred to [facility] for MRI and further evaluation.     In the ED at [facility] she was hemodynamically stable with an unremarkable neurologic exam. She was evaluated by both neurology and neurosurgery. Her vertigo was determined to most likely be peripheral in setting of BPPV. She was treated with IVF, Meclizine, and Reglan with resolution of her symptoms. MR venogram and brain stereotactic confirmed 2cm Meningioma. MR also found incidental remote cerebellar infarcts which were thought to be old. It was determined that no surgical interventions were necessary as an inpatient. Her hospital course was otherwise uncomplicated.    On day of discharge, patient was clinically stable and asymptomatic. She was discharged to home with Meclizine and Reglan as needed for recurrence of symptoms. She was encouraged to follow-up with her PCP, neurology, neurosurgery, and hepatology for management of her conditions.

## 2023-09-01 NOTE — PROGRESS NOTE ADULT - PROBLEM SELECTOR PLAN 1
Patient reports acute onset vertigo, and unsteadiness on feet. No other neuro deficits so stroke less likely.   Possibly from ?meningioma seen on CT, though can be peripheral.   No nystagmus appreciated on exam.   - neuro eval appreciated  -- Symptomatic management w/ IVF, Meclizine 50mg BID prn, Reglan 10mg QID  - pending MRI Head + and without COntrast   - PT/OT evaluation   - vestibular rehab on DC likely  - dysphagia screen Patient reports acute onset vertigo, and unsteadiness on feet. No other neuro deficits so stroke less likely.   Possibly from ?meningioma seen on CT, though can be peripheral.  No nystagmus appreciated on exam. MR showing meningioma and remote cerebellar infarcts. Now asymptomatic with IVF, meclizine, and reglan  - neuro eval appreciated  - Symptomatic management w/ IVF, Meclizine 50mg BID prn, Reglan 10mg QID  - PT/OT evaluation   - vestibular rehab on DC likely Patient reports acute onset vertigo, and unsteadiness on feet. No other neuro deficits so stroke less likely.   Possibly from ?meningioma seen on CT, though can be peripheral.  No nystagmus appreciated on exam. MR showing meningioma and remote cerebellar infarcts. Now asymptomatic with IVF, meclizine, and reglan  - neuro eval appreciated; clear for dc recommending ASA and statin as outpatient. Will clarify if safe to use w/ hepatology  - Symptomatic management w/ IVF, Meclizine 50mg BID prn, Reglan 10mg QID  - PT/OT evaluation   - vestibular rehab on DC likely

## 2023-09-01 NOTE — DISCHARGE NOTE PROVIDER - NSFOLLOWUPCLINICS_GEN_ALL_ED_FT
[facility]  Neurology Autoimmune Encephalitis  [address] [place] [address]  Phone: [phone]  Fax: [phone]  Follow Up Time: 2 weeks

## 2023-09-01 NOTE — PROGRESS NOTE ADULT - SUBJECTIVE AND OBJECTIVE BOX
Estevan Valente MD  PGY 1 Department of Internal Medicine        Patient is a 67y old  Female who presents with a chief complaint of Dizziness (01 Sep 2023 07:03)      SUBJECTIVE / OVERNIGHT EVENTS: Pt seen and examined. No acute overnight events. Her dizziness has resolved. Denies fevers, chills, CP, SOB, Abdominal pain, N/V, Constipation, Diarrhea        MEDICATIONS  (STANDING):  azaTHIOprine 50 milliGRAM(s) Oral two times a day  enoxaparin Injectable 30 milliGRAM(s) SubCutaneous every 24 hours  lactated ringers. 500 milliLiter(s) (100 mL/Hr) IV Continuous <Continuous>  meclizine 25 milliGRAM(s) Oral every 8 hours    MEDICATIONS  (PRN):  acetaminophen     Tablet .. 650 milliGRAM(s) Oral every 6 hours PRN Temp greater or equal to 38C (100.4F), Mild Pain (1 - 3)  melatonin 3 milliGRAM(s) Oral at bedtime PRN Insomnia  metoclopramide Injectable 5 milliGRAM(s) IV Push every 8 hours PRN nausea/vomiting      I&O's Summary    31 Aug 2023 07:01  -  01 Sep 2023 07:00  --------------------------------------------------------  IN: 400 mL / OUT: 0 mL / NET: 400 mL        Vital Signs Last 24 Hrs  T(C): 36.6 (01 Sep 2023 04:41), Max: 37 (31 Aug 2023 17:45)  T(F): 97.9 (01 Sep 2023 04:41), Max: 98.6 (31 Aug 2023 17:45)  HR: 56 (01 Sep 2023 04:41) (56 - 67)  BP: 95/59 (01 Sep 2023 04:41) (90/51 - 120/69)  BP(mean): 64 (31 Aug 2023 17:45) (64 - 83)  RR: 18 (01 Sep 2023 04:41) (16 - 18)  SpO2: 99% (01 Sep 2023 04:41) (97% - 100%)    Parameters below as of 01 Sep 2023 04:41  Patient On (Oxygen Delivery Method): room air        CAPILLARY BLOOD GLUCOSE          PHYSICAL EXAM:  GENERAL: NAD,   HEAD:  Atraumatic, Normocephalic  EYES: EOMI, PERRL, conjunctiva and sclera clear  NECK: No JVD  CHEST/LUNG: Clear to auscultation bilaterally; No wheeze  HEART: Regular rate and rhythm; No murmurs, rubs, or gallops  ABDOMEN: Soft, Nontender, Nondistended; Bowel sounds present  EXTREMITIES:  2+ Peripheral Pulses, No clubbing, cyanosis, or edema  PSYCH: AAOx3  NEUROLOGY: non-focal  SKIN: No rashes or lesions       LABS:                        10.4   3.54  )-----------( 151      ( 01 Sep 2023 06:47 )             31.3     Auto Eosinophil # 0.03  / Auto Eosinophil % 0.9   / Auto Neutrophil # 1.82  / Auto Neutrophil % 51.3  / BANDS % x                            11.6   4.42  )-----------( 184      ( 31 Aug 2023 05:15 )             36.0     Auto Eosinophil # 0.10  / Auto Eosinophil % 2.3   / Auto Neutrophil # 2.87  / Auto Neutrophil % 64.9  / BANDS % x        09-01    141  |  107  |  16  ----------------------------<  89  3.7   |  23  |  0.50  08-31    141  |  107  |  15  ----------------------------<  128<H>  3.7   |  25  |  0.54    Ca    8.8      01 Sep 2023 06:47  TPro  5.7<L>  /  Alb  3.4  /  TBili  0.3  /  DBili  x   /  AST  13  /  ALT  7<L>  /  AlkPhos  96  09-01  TPro  6.9  /  Alb  3.3  /  TBili  0.4  /  DBili  x   /  AST  22  /  ALT  18  /  AlkPhos  117  08-31    PT/INR - ( 01 Sep 2023 06:49 )   PT: 10.6 sec;   INR: 1.01 ratio         PTT - ( 01 Sep 2023 06:49 )  PTT:28.9 sec      Urinalysis Basic - ( 01 Sep 2023 06:47 )    Color: x / Appearance: x / SG: x / pH: x  Gluc: 89 mg/dL / Ketone: x  / Bili: x / Urobili: x   Blood: x / Protein: x / Nitrite: x   Leuk Esterase: x / RBC: x / WBC x   Sq Epi: x / Non Sq Epi: x / Bacteria: x         Estevan Valente MD  PGY 1 Department of Internal Medicine        Patient is a 67y old  Female who presents with a chief complaint of Dizziness (01 Sep 2023 07:03)      SUBJECTIVE / OVERNIGHT EVENTS: Pt seen and examined. No acute overnight events. Her dizziness has resolved. Denies fevers, chills, CP, SOB, Abdominal pain, N/V, Constipation, Diarrhea        MEDICATIONS  (STANDING):  azaTHIOprine 50 milliGRAM(s) Oral two times a day  enoxaparin Injectable 30 milliGRAM(s) SubCutaneous every 24 hours  lactated ringers. 500 milliLiter(s) (100 mL/Hr) IV Continuous <Continuous>  meclizine 25 milliGRAM(s) Oral every 8 hours    MEDICATIONS  (PRN):  acetaminophen     Tablet .. 650 milliGRAM(s) Oral every 6 hours PRN Temp greater or equal to 38C (100.4F), Mild Pain (1 - 3)  melatonin 3 milliGRAM(s) Oral at bedtime PRN Insomnia  metoclopramide Injectable 5 milliGRAM(s) IV Push every 8 hours PRN nausea/vomiting      I&O's Summary    31 Aug 2023 07:01  -  01 Sep 2023 07:00  --------------------------------------------------------  IN: 400 mL / OUT: 0 mL / NET: 400 mL        Vital Signs Last 24 Hrs  T(C): 36.6 (01 Sep 2023 04:41), Max: 37 (31 Aug 2023 17:45)  T(F): 97.9 (01 Sep 2023 04:41), Max: 98.6 (31 Aug 2023 17:45)  HR: 56 (01 Sep 2023 04:41) (56 - 67)  BP: 95/59 (01 Sep 2023 04:41) (90/51 - 120/69)  BP(mean): 64 (31 Aug 2023 17:45) (64 - 83)  RR: 18 (01 Sep 2023 04:41) (16 - 18)  SpO2: 99% (01 Sep 2023 04:41) (97% - 100%)    Parameters below as of 01 Sep 2023 04:41  Patient On (Oxygen Delivery Method): room air        CAPILLARY BLOOD GLUCOSE          PHYSICAL EXAM:  GENERAL: NAD,   HEAD:  Atraumatic, Normocephalic  EYES: EOMI, PERRL, conjunctiva and sclera clear  NECK: No JVD  CHEST/LUNG: Clear to auscultation bilaterally; No wheeze  HEART: Regular rate and rhythm; No murmurs, rubs, or gallops  ABDOMEN: Soft, Nontender, Nondistended; Bowel sounds present  EXTREMITIES:  2+ Peripheral Pulses, No clubbing, cyanosis, or edema  PSYCH: AAOx3  NEUROLOGY: non-focal  SKIN: No rashes or lesions       LABS:                        10.4   3.54  )-----------( 151      ( 01 Sep 2023 06:47 )             31.3     Auto Eosinophil # 0.03  / Auto Eosinophil % 0.9   / Auto Neutrophil # 1.82  / Auto Neutrophil % 51.3  / BANDS % x                            11.6   4.42  )-----------( 184      ( 31 Aug 2023 05:15 )             36.0     Auto Eosinophil # 0.10  / Auto Eosinophil % 2.3   / Auto Neutrophil # 2.87  / Auto Neutrophil % 64.9  / BANDS % x        09-01    141  |  107  |  16  ----------------------------<  89  3.7   |  23  |  0.50  08-31    141  |  107  |  15  ----------------------------<  128<H>  3.7   |  25  |  0.54    Ca    8.8      01 Sep 2023 06:47  TPro  5.7<L>  /  Alb  3.4  /  TBili  0.3  /  DBili  x   /  AST  13  /  ALT  7<L>  /  AlkPhos  96  09-01  TPro  6.9  /  Alb  3.3  /  TBili  0.4  /  DBili  x   /  AST  22  /  ALT  18  /  AlkPhos  117  08-31    PT/INR - ( 01 Sep 2023 06:49 )   PT: 10.6 sec;   INR: 1.01 ratio         PTT - ( 01 Sep 2023 06:49 )  PTT:28.9 sec      Urinalysis Basic - ( 01 Sep 2023 06:47 )    Color: x / Appearance: x / SG: x / pH: x  Gluc: 89 mg/dL / Ketone: x  / Bili: x / Urobili: x   Blood: x / Protein: x / Nitrite: x   Leuk Esterase: x / RBC: x / WBC x   Sq Epi: x / Non Sq Epi: x / Bacteria: x      MR Venogram head and brain stereotactic  FINDINGS:    MR BRAIN:    BRAIN and CSF SPACES:   The brain is again noted for an extra-axial mass   lesion distorting the straight sinus. This lesion measures approximately   2 cm in diameter. The lesion distorts and otherwise patent straight   sinus. The lesion causes displacement of gyri in the posterior medial   temporal lobes without vasogenic edema or invasion.    No brain parenchymal enhancement is found. No cerebral cortical lesion is   identified. Multiple small remote infarctions are evident within the   cerebellar hemispheres. Within the supratentorial brain the cerebral   hemispheric white matter demonstrates mild periatrial and periventricular   hyperintensity on the long TR images to suggest minimal ischemic white   matter disease. No intracranial hemorrhage is appreciated. No mass effect   is found intrinsic to the brain.    The ventricles and sulci are mildly dilated collecting his brain volume   loss.    HEAD AND NECK STRUCTURES:   The orbits are unremarkable.  Paranasal   sinuses are clear.  The nasal cavity appears intact.  The central skull   base appears intact.  The nasopharynxis symmetric.  The temporal bones   demonstrate small effusions, right more extensive than left. Within one   right posterior mastoid air cell  there is rim enhancement. The calvarium   appears unremarkable.    MRV BRAIN:    The principal dural venous sinuses are patent.  This includes the   superior sagittal sinus anterior and posterior limbs.  The right   transverse is patent to the right sigmoid sinus and right internal   jugular vein.  The left transverse is patent to the left sigmoid sinus   and left internal jugular vein. Asymmetry of the transverse sinus caliber   is within the limits of developmental variation.    Cerebral cortical veins have physiologic appearance and approximate   symmetry.    The internal cerebral veins, basal veins of Kaia and vein of Job   appear intact and unremarkable. The straight sinus is distorted by the   mass lesion in its proximal aspect but remains patent.    The cavernous sinuses appear symmetric and demonstrate no anomalous flow.     The superior ophthalmic veins are not dilated.    No anomalous vascularity is identified.      IMPRESSION:    1.  MR BRAIN:   Meningioma 2 cm distorts the straight sinus. Cerebellar   remote infarctions.    2.  MRV brain:   No evidence of dural sinus thrombosis.    3. Right posterior mastoid lesion is indeterminate. This may reflect   chronic mastoiditis or other likely benign process

## 2023-09-04 ENCOUNTER — TRANSCRIPTION ENCOUNTER (OUTPATIENT)
Age: 68
End: 2023-09-04

## 2023-09-05 ENCOUNTER — RESULT REVIEW (OUTPATIENT)
Age: 68
End: 2023-09-05

## 2023-09-05 ENCOUNTER — APPOINTMENT (OUTPATIENT)
Dept: UROLOGY | Facility: AMBULATORY SURGERY CENTER | Age: 68
End: 2023-09-05

## 2023-09-05 ENCOUNTER — OUTPATIENT (OUTPATIENT)
Dept: OUTPATIENT SERVICES | Facility: HOSPITAL | Age: 68
LOS: 1 days | Discharge: HOME | End: 2023-09-05
Payer: MEDICARE

## 2023-09-05 ENCOUNTER — TRANSCRIPTION ENCOUNTER (OUTPATIENT)
Age: 68
End: 2023-09-05

## 2023-09-05 VITALS
WEIGHT: 177.03 LBS | TEMPERATURE: 98 F | RESPIRATION RATE: 18 BRPM | OXYGEN SATURATION: 97 % | HEART RATE: 73 BPM | DIASTOLIC BLOOD PRESSURE: 79 MMHG | SYSTOLIC BLOOD PRESSURE: 106 MMHG | HEIGHT: 68 IN

## 2023-09-05 VITALS
OXYGEN SATURATION: 100 % | HEART RATE: 65 BPM | SYSTOLIC BLOOD PRESSURE: 105 MMHG | DIASTOLIC BLOOD PRESSURE: 78 MMHG | TEMPERATURE: 98 F | RESPIRATION RATE: 25 BRPM

## 2023-09-05 DIAGNOSIS — Z98.890 OTHER SPECIFIED POSTPROCEDURAL STATES: Chronic | ICD-10-CM

## 2023-09-05 DIAGNOSIS — N32.89 OTHER SPECIFIED DISORDERS OF BLADDER: ICD-10-CM

## 2023-09-05 DIAGNOSIS — Z98.84 BARIATRIC SURGERY STATUS: Chronic | ICD-10-CM

## 2023-09-05 PROCEDURE — 52235 CYSTOSCOPY AND TREATMENT: CPT

## 2023-09-05 PROCEDURE — 88307 TISSUE EXAM BY PATHOLOGIST: CPT | Mod: 26

## 2023-09-05 PROCEDURE — 88305 TISSUE EXAM BY PATHOLOGIST: CPT | Mod: 26

## 2023-09-05 RX ORDER — GEMCITABINE 38 MG/ML
2 INJECTION, SOLUTION INTRAVENOUS ONCE
Refills: 0 | Status: DISCONTINUED | OUTPATIENT
Start: 2023-09-05 | End: 2023-09-05

## 2023-09-05 RX ORDER — GEMCITABINE 38 MG/ML
2000 INJECTION, SOLUTION INTRAVENOUS ONCE
Refills: 0 | Status: DISCONTINUED | OUTPATIENT
Start: 2023-09-05 | End: 2023-09-19

## 2023-09-05 RX ORDER — AZATHIOPRINE 100 MG/1
2 TABLET ORAL
Refills: 0 | DISCHARGE

## 2023-09-05 NOTE — ASU DISCHARGE PLAN (ADULT/PEDIATRIC) - ASU DC SPECIAL INSTRUCTIONSFT
PAIN CONTROL: You may take 650 mg of Tylenol every 4-6 hours. Take over the counter pain medications as needed.  BATHING: Please do not submerge wound underwater. You may shower after 48 hours and/or sponge bathe.  ACTIVITY: No heavy lifting or straining. Otherwise, you may return to your usual level of physical activity.  DIET: Return to your usual diet.  NOTIFY YOUR SURGEON IF: You have any bleeding that does not stop, any pus draining from your wound, any fever (over 100.4 F) or chills, persistent nausea/vomiting, persistent diarrhea, or if your pain is not controlled on your discharge pain medications.  FOLLOW-UP:  1. Please call your surgeon to make a follow-up appointment within 1-2 weeks of discharge

## 2023-09-05 NOTE — ASU PREOPERATIVE ASSESSMENT, ADULT ([facility] ONLY) - FALL HARM RISK - HARM RISK INTERVENTIONS

## 2023-09-05 NOTE — ASU DISCHARGE PLAN (ADULT/PEDIATRIC) - CARE PROVIDER_API CALL
Byron Donovan  Urology  [address] [place] [address], [address] [address]  Phone: [phone]  Fax: [phone]  Follow Up Time:

## 2023-09-06 PROCEDURE — 76998 US GUIDE INTRAOP: CPT | Mod: 26,XU

## 2023-09-06 PROCEDURE — 50543 LAPARO PARTIAL NEPHRECTOMY: CPT | Mod: LT

## 2023-09-06 PROCEDURE — 52353 CYSTOURETERO W/LITHOTRIPSY: CPT | Mod: LT

## 2023-09-06 PROCEDURE — 74420 UROGRAPHY RTRGR +-KUB: CPT | Mod: 26

## 2023-09-07 ENCOUNTER — NON-APPOINTMENT (OUTPATIENT)
Age: 68
End: 2023-09-07

## 2023-09-07 LAB — SURGICAL PATHOLOGY STUDY: SIGNIFICANT CHANGE UP

## 2023-09-07 RX ORDER — PREDNISONE 2.5 MG/1
2.5 TABLET ORAL
Qty: 90 | Refills: 3 | Status: COMPLETED | COMMUNITY
Start: 2023-01-03 | End: 2023-09-07

## 2023-09-07 RX ORDER — CHROMIUM 200 MCG
1000 TABLET ORAL
Refills: 0 | Status: COMPLETED | COMMUNITY
End: 2023-09-07

## 2023-09-12 ENCOUNTER — NON-APPOINTMENT (OUTPATIENT)
Age: 68
End: 2023-09-12

## 2023-09-12 ENCOUNTER — APPOINTMENT (OUTPATIENT)
Dept: NEUROSURGERY | Facility: CLINIC | Age: 68
End: 2023-09-12
Payer: MEDICARE

## 2023-09-12 VITALS
HEART RATE: 68 BPM | DIASTOLIC BLOOD PRESSURE: 68 MMHG | WEIGHT: 164 LBS | BODY MASS INDEX: 24.29 KG/M2 | HEIGHT: 69 IN | OXYGEN SATURATION: 98 % | SYSTOLIC BLOOD PRESSURE: 113 MMHG

## 2023-09-12 PROCEDURE — 99213 OFFICE O/P EST LOW 20 MIN: CPT

## 2023-09-14 ENCOUNTER — APPOINTMENT (OUTPATIENT)
Dept: NEUROLOGY | Facility: CLINIC | Age: 68
End: 2023-09-14
Payer: MEDICARE

## 2023-09-14 VITALS
HEART RATE: 64 BPM | HEIGHT: 69 IN | DIASTOLIC BLOOD PRESSURE: 69 MMHG | BODY MASS INDEX: 24.29 KG/M2 | WEIGHT: 164 LBS | SYSTOLIC BLOOD PRESSURE: 114 MMHG

## 2023-09-14 DIAGNOSIS — H81.399 OTHER PERIPHERAL VERTIGO, UNSPECIFIED EAR: ICD-10-CM

## 2023-09-14 DIAGNOSIS — D32.9 BENIGN NEOPLASM OF MENINGES, UNSPECIFIED: ICD-10-CM

## 2023-09-14 PROCEDURE — 99215 OFFICE O/P EST HI 40 MIN: CPT

## 2023-09-19 LAB
ALBUMIN SERPL ELPH-MCNC: 4.4 G/DL
ALP BLD-CCNC: 123 U/L
ALT SERPL-CCNC: 9 U/L
ANION GAP SERPL CALC-SCNC: 13 MMOL/L
AST SERPL-CCNC: 21 U/L
BILIRUB SERPL-MCNC: 0.2 MG/DL
BUN SERPL-MCNC: 17 MG/DL
CALCIUM SERPL-MCNC: 9.3 MG/DL
CHLORIDE SERPL-SCNC: 105 MMOL/L
CO2 SERPL-SCNC: 24 MMOL/L
CREAT SERPL-MCNC: 0.57 MG/DL
EGFR: 99 ML/MIN/1.73M2
GGT SERPL-CCNC: 12 U/L
GLUCOSE SERPL-MCNC: 77 MG/DL
IGG SER QL IEP: 812 MG/DL
POTASSIUM SERPL-SCNC: 5 MMOL/L
PROT SERPL-MCNC: 6.8 G/DL
SODIUM SERPL-SCNC: 142 MMOL/L

## 2023-09-27 ENCOUNTER — APPOINTMENT (OUTPATIENT)
Dept: UROLOGY | Facility: CLINIC | Age: 68
End: 2023-09-27
Payer: MEDICARE

## 2023-09-27 ENCOUNTER — OUTPATIENT (OUTPATIENT)
Dept: OUTPATIENT SERVICES | Facility: HOSPITAL | Age: 68
LOS: 1 days | End: 2023-09-27
Payer: MEDICARE

## 2023-09-27 VITALS
TEMPERATURE: 98 F | DIASTOLIC BLOOD PRESSURE: 71 MMHG | RESPIRATION RATE: 16 BRPM | SYSTOLIC BLOOD PRESSURE: 121 MMHG | HEART RATE: 67 BPM

## 2023-09-27 VITALS — DIASTOLIC BLOOD PRESSURE: 75 MMHG | RESPIRATION RATE: 15 BRPM | SYSTOLIC BLOOD PRESSURE: 130 MMHG | HEART RATE: 59 BPM

## 2023-09-27 DIAGNOSIS — R35.0 FREQUENCY OF MICTURITION: ICD-10-CM

## 2023-09-27 PROCEDURE — 51720 TREATMENT OF BLADDER LESION: CPT

## 2023-09-27 RX ORDER — GEMCITABINE 38 MG/ML
1 INJECTION, SOLUTION INTRAVENOUS ONCE
Refills: 0 | Status: DISCONTINUED | OUTPATIENT
Start: 2023-09-27 | End: 2023-10-11

## 2023-09-27 RX ORDER — GEMCITABINE HYDROCHLORIDE 1 G/1
1 INJECTION, POWDER, LYOPHILIZED, FOR SOLUTION INTRAVENOUS
Qty: 1 | Refills: 0 | Status: COMPLETED | OUTPATIENT
Start: 2023-09-26 | End: 2023-09-27

## 2023-09-27 RX ORDER — GEMCITABINE HYDROCHLORIDE 1 G/1
1 INJECTION, POWDER, LYOPHILIZED, FOR SOLUTION INTRAVENOUS
Qty: 1 | Refills: 0 | Status: COMPLETED | OUTPATIENT
Start: 2023-09-27

## 2023-09-27 RX ADMIN — GEMCITABINE HYDROCHLORIDE 0 GM: 1 INJECTION, POWDER, LYOPHILIZED, FOR SOLUTION INTRAVENOUS at 00:00

## 2023-09-28 DIAGNOSIS — C67.9 MALIGNANT NEOPLASM OF BLADDER, UNSPECIFIED: ICD-10-CM

## 2023-10-04 ENCOUNTER — APPOINTMENT (OUTPATIENT)
Dept: UROLOGY | Facility: CLINIC | Age: 68
End: 2023-10-04
Payer: MEDICARE

## 2023-10-04 ENCOUNTER — OUTPATIENT (OUTPATIENT)
Dept: OUTPATIENT SERVICES | Facility: HOSPITAL | Age: 68
LOS: 1 days | End: 2023-10-04
Payer: MEDICARE

## 2023-10-04 VITALS
RESPIRATION RATE: 16 BRPM | SYSTOLIC BLOOD PRESSURE: 113 MMHG | TEMPERATURE: 98.2 F | HEART RATE: 83 BPM | DIASTOLIC BLOOD PRESSURE: 68 MMHG

## 2023-10-04 DIAGNOSIS — Z98.84 BARIATRIC SURGERY STATUS: Chronic | ICD-10-CM

## 2023-10-04 DIAGNOSIS — Z98.890 OTHER SPECIFIED POSTPROCEDURAL STATES: Chronic | ICD-10-CM

## 2023-10-04 DIAGNOSIS — R35.0 FREQUENCY OF MICTURITION: ICD-10-CM

## 2023-10-04 PROCEDURE — 51720 TREATMENT OF BLADDER LESION: CPT

## 2023-10-04 RX ORDER — GEMCITABINE 38 MG/ML
1 INJECTION, SOLUTION INTRAVENOUS ONCE
Refills: 0 | Status: DISCONTINUED | OUTPATIENT
Start: 2023-10-04 | End: 2023-10-18

## 2023-10-04 RX ORDER — GEMCITABINE HYDROCHLORIDE 1 G/1
1 INJECTION, POWDER, LYOPHILIZED, FOR SOLUTION INTRAVENOUS
Qty: 1 | Refills: 0 | Status: COMPLETED | OUTPATIENT
Start: 2023-10-04 | End: 2023-10-04

## 2023-10-04 RX ORDER — GEMCITABINE HYDROCHLORIDE 1 G/1
1 INJECTION, POWDER, LYOPHILIZED, FOR SOLUTION INTRAVENOUS
Qty: 1 | Refills: 0 | Status: COMPLETED | OUTPATIENT
Start: 2023-10-04

## 2023-10-04 RX ADMIN — GEMCITABINE HYDROCHLORIDE 0 GM: 1 INJECTION, POWDER, LYOPHILIZED, FOR SOLUTION INTRAVENOUS at 00:00

## 2023-10-05 DIAGNOSIS — N32.89 OTHER SPECIFIED DISORDERS OF BLADDER: ICD-10-CM

## 2023-10-08 ENCOUNTER — NON-APPOINTMENT (OUTPATIENT)
Age: 68
End: 2023-10-08

## 2023-10-11 ENCOUNTER — APPOINTMENT (OUTPATIENT)
Dept: UROLOGY | Facility: CLINIC | Age: 68
End: 2023-10-11
Payer: MEDICARE

## 2023-10-11 ENCOUNTER — OUTPATIENT (OUTPATIENT)
Dept: OUTPATIENT SERVICES | Facility: HOSPITAL | Age: 68
LOS: 1 days | End: 2023-10-11
Payer: MEDICARE

## 2023-10-11 VITALS — HEART RATE: 65 BPM | DIASTOLIC BLOOD PRESSURE: 81 MMHG | SYSTOLIC BLOOD PRESSURE: 121 MMHG

## 2023-10-11 DIAGNOSIS — Z98.890 OTHER SPECIFIED POSTPROCEDURAL STATES: Chronic | ICD-10-CM

## 2023-10-11 DIAGNOSIS — R35.0 FREQUENCY OF MICTURITION: ICD-10-CM

## 2023-10-11 PROCEDURE — 51720 TREATMENT OF BLADDER LESION: CPT

## 2023-10-11 RX ORDER — GEMCITABINE HYDROCHLORIDE 1 G/1
1 INJECTION, POWDER, LYOPHILIZED, FOR SOLUTION INTRAVENOUS
Qty: 1 | Refills: 0 | Status: COMPLETED | OUTPATIENT
Start: 2023-10-10 | End: 2023-10-11

## 2023-10-11 RX ORDER — GEMCITABINE HYDROCHLORIDE 1 G/1
1 INJECTION, POWDER, LYOPHILIZED, FOR SOLUTION INTRAVENOUS
Qty: 1 | Refills: 0 | Status: COMPLETED | OUTPATIENT
Start: 2023-10-11

## 2023-10-11 RX ORDER — GEMCITABINE 38 MG/ML
1 INJECTION, SOLUTION INTRAVENOUS ONCE
Refills: 0 | Status: DISCONTINUED | OUTPATIENT
Start: 2023-10-11 | End: 2023-10-25

## 2023-10-11 RX ADMIN — GEMCITABINE HYDROCHLORIDE 0 GM: 1 INJECTION, POWDER, LYOPHILIZED, FOR SOLUTION INTRAVENOUS at 00:00

## 2023-10-12 DIAGNOSIS — D49.4 NEOPLASM OF UNSPECIFIED BEHAVIOR OF BLADDER: ICD-10-CM

## 2023-10-12 DIAGNOSIS — C67.9 MALIGNANT NEOPLASM OF BLADDER, UNSPECIFIED: ICD-10-CM

## 2023-10-16 ENCOUNTER — OUTPATIENT (OUTPATIENT)
Dept: OUTPATIENT SERVICES | Facility: HOSPITAL | Age: 68
LOS: 1 days | End: 2023-10-16
Payer: MEDICARE

## 2023-10-16 ENCOUNTER — APPOINTMENT (OUTPATIENT)
Dept: UROLOGY | Facility: CLINIC | Age: 68
End: 2023-10-16
Payer: MEDICARE

## 2023-10-16 VITALS
HEART RATE: 59 BPM | OXYGEN SATURATION: 100 % | DIASTOLIC BLOOD PRESSURE: 73 MMHG | RESPIRATION RATE: 16 BRPM | SYSTOLIC BLOOD PRESSURE: 118 MMHG

## 2023-10-16 DIAGNOSIS — Z98.84 BARIATRIC SURGERY STATUS: Chronic | ICD-10-CM

## 2023-10-16 DIAGNOSIS — Z98.890 OTHER SPECIFIED POSTPROCEDURAL STATES: Chronic | ICD-10-CM

## 2023-10-16 DIAGNOSIS — R35.0 FREQUENCY OF MICTURITION: ICD-10-CM

## 2023-10-16 PROCEDURE — 51720 TREATMENT OF BLADDER LESION: CPT

## 2023-10-16 RX ORDER — GEMCITABINE 38 MG/ML
1 INJECTION, SOLUTION INTRAVENOUS ONCE
Refills: 0 | Status: DISCONTINUED | OUTPATIENT
Start: 2023-10-16 | End: 2023-10-30

## 2023-10-16 RX ORDER — GEMCITABINE HYDROCHLORIDE 1 G/1
1 INJECTION, POWDER, LYOPHILIZED, FOR SOLUTION INTRAVENOUS
Qty: 1 | Refills: 0 | Status: COMPLETED | OUTPATIENT
Start: 2023-10-16 | End: 2023-10-16

## 2023-10-16 RX ORDER — GEMCITABINE HYDROCHLORIDE 1 G/1
1 INJECTION, POWDER, LYOPHILIZED, FOR SOLUTION INTRAVENOUS
Qty: 1 | Refills: 0 | Status: COMPLETED | OUTPATIENT
Start: 2023-10-16

## 2023-10-16 RX ADMIN — GEMCITABINE HYDROCHLORIDE 0 GM: 1 INJECTION, POWDER, LYOPHILIZED, FOR SOLUTION INTRAVENOUS at 00:00

## 2023-10-17 DIAGNOSIS — C67.9 MALIGNANT NEOPLASM OF BLADDER, UNSPECIFIED: ICD-10-CM

## 2023-10-18 ENCOUNTER — APPOINTMENT (OUTPATIENT)
Dept: UROLOGY | Facility: CLINIC | Age: 68
End: 2023-10-18

## 2023-10-20 ENCOUNTER — OUTPATIENT (OUTPATIENT)
Dept: OUTPATIENT SERVICES | Facility: HOSPITAL | Age: 68
LOS: 1 days | End: 2023-10-20
Payer: MEDICARE

## 2023-10-20 ENCOUNTER — APPOINTMENT (OUTPATIENT)
Dept: UROLOGY | Facility: CLINIC | Age: 68
End: 2023-10-20
Payer: MEDICARE

## 2023-10-20 VITALS
RESPIRATION RATE: 16 BRPM | HEIGHT: 69 IN | HEART RATE: 70 BPM | OXYGEN SATURATION: 100 % | SYSTOLIC BLOOD PRESSURE: 120 MMHG | WEIGHT: 164 LBS | BODY MASS INDEX: 24.29 KG/M2 | DIASTOLIC BLOOD PRESSURE: 75 MMHG

## 2023-10-20 DIAGNOSIS — Z98.890 OTHER SPECIFIED POSTPROCEDURAL STATES: Chronic | ICD-10-CM

## 2023-10-20 DIAGNOSIS — R35.0 FREQUENCY OF MICTURITION: ICD-10-CM

## 2023-10-20 DIAGNOSIS — Z98.84 BARIATRIC SURGERY STATUS: Chronic | ICD-10-CM

## 2023-10-20 PROCEDURE — 51720 TREATMENT OF BLADDER LESION: CPT | Mod: 58

## 2023-10-20 PROCEDURE — 51720 TREATMENT OF BLADDER LESION: CPT

## 2023-10-20 RX ORDER — GEMCITABINE 38 MG/ML
1 INJECTION, SOLUTION INTRAVENOUS ONCE
Refills: 0 | Status: DISCONTINUED | OUTPATIENT
Start: 2023-10-20 | End: 2023-11-03

## 2023-10-23 ENCOUNTER — APPOINTMENT (OUTPATIENT)
Dept: UROLOGY | Facility: CLINIC | Age: 68
End: 2023-10-23

## 2023-10-23 DIAGNOSIS — C67.9 MALIGNANT NEOPLASM OF BLADDER, UNSPECIFIED: ICD-10-CM

## 2023-10-24 ENCOUNTER — APPOINTMENT (OUTPATIENT)
Dept: UROLOGY | Facility: CLINIC | Age: 68
End: 2023-10-24
Payer: MEDICARE

## 2023-10-24 ENCOUNTER — OUTPATIENT (OUTPATIENT)
Dept: OUTPATIENT SERVICES | Facility: HOSPITAL | Age: 68
LOS: 1 days | End: 2023-10-24
Payer: MEDICARE

## 2023-10-24 VITALS
RESPIRATION RATE: 17 BRPM | WEIGHT: 174 LBS | HEIGHT: 69 IN | DIASTOLIC BLOOD PRESSURE: 78 MMHG | HEART RATE: 58 BPM | SYSTOLIC BLOOD PRESSURE: 118 MMHG | BODY MASS INDEX: 25.77 KG/M2

## 2023-10-24 DIAGNOSIS — R35.0 FREQUENCY OF MICTURITION: ICD-10-CM

## 2023-10-24 DIAGNOSIS — Z98.84 BARIATRIC SURGERY STATUS: Chronic | ICD-10-CM

## 2023-10-24 DIAGNOSIS — Z98.890 OTHER SPECIFIED POSTPROCEDURAL STATES: Chronic | ICD-10-CM

## 2023-10-24 PROCEDURE — 51720 TREATMENT OF BLADDER LESION: CPT | Mod: 58

## 2023-10-24 PROCEDURE — 51720 TREATMENT OF BLADDER LESION: CPT

## 2023-10-24 RX ORDER — GEMCITABINE HYDROCHLORIDE 1 G/1
1 INJECTION, POWDER, LYOPHILIZED, FOR SOLUTION INTRAVENOUS
Qty: 1 | Refills: 0 | Status: COMPLETED | OUTPATIENT
Start: 2023-10-24 | End: 2023-10-24

## 2023-10-24 RX ORDER — GEMCITABINE HYDROCHLORIDE 1 G/1
1 INJECTION, POWDER, LYOPHILIZED, FOR SOLUTION INTRAVENOUS
Qty: 1 | Refills: 0 | Status: COMPLETED | OUTPATIENT
Start: 2023-10-20 | End: 2023-10-20

## 2023-10-24 RX ORDER — GEMCITABINE 38 MG/ML
1 INJECTION, SOLUTION INTRAVENOUS ONCE
Refills: 0 | Status: DISCONTINUED | OUTPATIENT
Start: 2023-10-24 | End: 2023-11-07

## 2023-10-24 RX ORDER — GEMCITABINE HYDROCHLORIDE 1 G/1
1 INJECTION, POWDER, LYOPHILIZED, FOR SOLUTION INTRAVENOUS
Qty: 1 | Refills: 0 | Status: COMPLETED | OUTPATIENT
Start: 2023-10-24

## 2023-10-24 RX ADMIN — GEMCITABINE HYDROCHLORIDE 0 GM: 1 INJECTION, POWDER, LYOPHILIZED, FOR SOLUTION INTRAVENOUS at 00:00

## 2023-10-25 ENCOUNTER — APPOINTMENT (OUTPATIENT)
Dept: UROLOGY | Facility: CLINIC | Age: 68
End: 2023-10-25

## 2023-10-25 DIAGNOSIS — C67.9 MALIGNANT NEOPLASM OF BLADDER, UNSPECIFIED: ICD-10-CM

## 2023-10-27 ENCOUNTER — APPOINTMENT (OUTPATIENT)
Dept: UROLOGY | Facility: CLINIC | Age: 68
End: 2023-10-27
Payer: MEDICARE

## 2023-10-27 ENCOUNTER — OUTPATIENT (OUTPATIENT)
Dept: OUTPATIENT SERVICES | Facility: HOSPITAL | Age: 68
LOS: 1 days | End: 2023-10-27
Payer: MEDICARE

## 2023-10-27 ENCOUNTER — MED ADMIN CHARGE (OUTPATIENT)
Age: 68
End: 2023-10-27

## 2023-10-27 VITALS — OXYGEN SATURATION: 100 % | DIASTOLIC BLOOD PRESSURE: 74 MMHG | SYSTOLIC BLOOD PRESSURE: 118 MMHG | HEART RATE: 58 BPM

## 2023-10-27 DIAGNOSIS — D49.4 NEOPLASM OF UNSPECIFIED BEHAVIOR OF BLADDER: ICD-10-CM

## 2023-10-27 DIAGNOSIS — Z98.84 BARIATRIC SURGERY STATUS: Chronic | ICD-10-CM

## 2023-10-27 DIAGNOSIS — Z98.890 OTHER SPECIFIED POSTPROCEDURAL STATES: Chronic | ICD-10-CM

## 2023-10-27 DIAGNOSIS — R35.0 FREQUENCY OF MICTURITION: ICD-10-CM

## 2023-10-27 PROCEDURE — 51720 TREATMENT OF BLADDER LESION: CPT

## 2023-10-27 PROCEDURE — 51720 TREATMENT OF BLADDER LESION: CPT | Mod: 58

## 2023-10-27 RX ORDER — GEMCITABINE HYDROCHLORIDE 1 G/1
1 INJECTION, POWDER, LYOPHILIZED, FOR SOLUTION INTRAVENOUS
Qty: 1 | Refills: 0 | Status: COMPLETED | OUTPATIENT
Start: 2023-10-27

## 2023-10-27 RX ORDER — GEMCITABINE 38 MG/ML
1 INJECTION, SOLUTION INTRAVENOUS ONCE
Refills: 0 | Status: DISCONTINUED | OUTPATIENT
Start: 2023-10-27 | End: 2023-11-10

## 2023-10-27 RX ORDER — GEMCITABINE HYDROCHLORIDE 1 G/1
1 INJECTION, POWDER, LYOPHILIZED, FOR SOLUTION INTRAVENOUS
Qty: 1 | Refills: 0 | Status: COMPLETED | OUTPATIENT
Start: 2023-10-27 | End: 2023-10-27

## 2023-10-27 RX ADMIN — GEMCITABINE HYDROCHLORIDE 0 GM: 1 INJECTION, POWDER, LYOPHILIZED, FOR SOLUTION INTRAVENOUS at 00:00

## 2023-10-30 DIAGNOSIS — D49.4 NEOPLASM OF UNSPECIFIED BEHAVIOR OF BLADDER: ICD-10-CM

## 2023-11-01 ENCOUNTER — APPOINTMENT (OUTPATIENT)
Dept: UROLOGY | Facility: CLINIC | Age: 68
End: 2023-11-01

## 2023-11-03 DIAGNOSIS — C67.9 MALIGNANT NEOPLASM OF BLADDER, UNSPECIFIED: ICD-10-CM

## 2023-11-27 ENCOUNTER — APPOINTMENT (OUTPATIENT)
Dept: HEPATOLOGY | Facility: CLINIC | Age: 68
End: 2023-11-27
Payer: MEDICARE

## 2023-11-27 DIAGNOSIS — Z01.818 ENCOUNTER FOR OTHER PREPROCEDURAL EXAMINATION: ICD-10-CM

## 2023-11-27 PROCEDURE — 99442: CPT | Mod: 95

## 2023-12-05 ENCOUNTER — APPOINTMENT (OUTPATIENT)
Dept: ULTRASOUND IMAGING | Facility: CLINIC | Age: 68
End: 2023-12-05
Payer: MEDICARE

## 2023-12-05 PROCEDURE — 93975 VASCULAR STUDY: CPT

## 2023-12-06 LAB
AFP-TM SERPL-MCNC: 2.9 NG/ML
ALBUMIN SERPL ELPH-MCNC: 4 G/DL
ALP BLD-CCNC: 122 U/L
ALT SERPL-CCNC: 8 U/L
ANION GAP SERPL CALC-SCNC: 10 MMOL/L
AST SERPL-CCNC: 17 U/L
BASOPHILS # BLD AUTO: 0.01 K/UL
BASOPHILS NFR BLD AUTO: 0.3 %
BILIRUB DIRECT SERPL-MCNC: 0.1 MG/DL
BILIRUB SERPL-MCNC: 0.2 MG/DL
BUN SERPL-MCNC: 14 MG/DL
CALCIUM SERPL-MCNC: 9.2 MG/DL
CHLORIDE SERPL-SCNC: 106 MMOL/L
CO2 SERPL-SCNC: 25 MMOL/L
CREAT SERPL-MCNC: 0.52 MG/DL
EGFR: 101 ML/MIN/1.73M2
EOSINOPHIL # BLD AUTO: 0.06 K/UL
EOSINOPHIL NFR BLD AUTO: 1.6 %
FERRITIN SERPL-MCNC: 16 NG/ML
GLUCOSE SERPL-MCNC: 92 MG/DL
HCT VFR BLD CALC: 35.5 %
HGB BLD-MCNC: 11.3 G/DL
IMM GRANULOCYTES NFR BLD AUTO: 0.3 %
IRON SATN MFR SERPL: 14 %
IRON SERPL-MCNC: 53 UG/DL
LYMPHOCYTES # BLD AUTO: 0.58 K/UL
LYMPHOCYTES NFR BLD AUTO: 15.7 %
MAN DIFF?: NORMAL
MCHC RBC-ENTMCNC: 29.2 PG
MCHC RBC-ENTMCNC: 31.8 GM/DL
MCV RBC AUTO: 91.7 FL
MONOCYTES # BLD AUTO: 0.48 K/UL
MONOCYTES NFR BLD AUTO: 13 %
NEUTROPHILS # BLD AUTO: 2.55 K/UL
NEUTROPHILS NFR BLD AUTO: 69.1 %
PLATELET # BLD AUTO: 148 K/UL
POTASSIUM SERPL-SCNC: 4 MMOL/L
PROT SERPL-MCNC: 6.4 G/DL
RBC # BLD: 3.87 M/UL
RBC # FLD: 15 %
SODIUM SERPL-SCNC: 142 MMOL/L
TIBC SERPL-MCNC: 363 UG/DL
UIBC SERPL-MCNC: 310 UG/DL
WBC # FLD AUTO: 3.69 K/UL

## 2023-12-08 ENCOUNTER — APPOINTMENT (OUTPATIENT)
Dept: UROLOGY | Facility: CLINIC | Age: 68
End: 2023-12-08
Payer: MEDICARE

## 2023-12-08 ENCOUNTER — OUTPATIENT (OUTPATIENT)
Dept: OUTPATIENT SERVICES | Facility: HOSPITAL | Age: 68
LOS: 1 days | End: 2023-12-08
Payer: MEDICARE

## 2023-12-08 DIAGNOSIS — Z98.890 OTHER SPECIFIED POSTPROCEDURAL STATES: Chronic | ICD-10-CM

## 2023-12-08 DIAGNOSIS — R35.0 FREQUENCY OF MICTURITION: ICD-10-CM

## 2023-12-08 DIAGNOSIS — Z98.84 BARIATRIC SURGERY STATUS: Chronic | ICD-10-CM

## 2023-12-08 LAB
IGG SER QL IEP: 766 MG/DL
INR PPP: 0.86 RATIO
PT BLD: 9.8 SEC

## 2023-12-08 PROCEDURE — 52000 CYSTOURETHROSCOPY: CPT

## 2023-12-11 ENCOUNTER — APPOINTMENT (OUTPATIENT)
Dept: HEPATOLOGY | Facility: CLINIC | Age: 68
End: 2023-12-11
Payer: MEDICARE

## 2023-12-11 VITALS
TEMPERATURE: 97.8 F | DIASTOLIC BLOOD PRESSURE: 77 MMHG | WEIGHT: 172 LBS | SYSTOLIC BLOOD PRESSURE: 116 MMHG | HEIGHT: 69 IN | OXYGEN SATURATION: 98 % | BODY MASS INDEX: 25.48 KG/M2 | HEART RATE: 67 BPM

## 2023-12-11 DIAGNOSIS — Z23 ENCOUNTER FOR IMMUNIZATION: ICD-10-CM

## 2023-12-11 DIAGNOSIS — C67.9 MALIGNANT NEOPLASM OF BLADDER, UNSPECIFIED: ICD-10-CM

## 2023-12-11 LAB — URINE CYTOLOGY: NORMAL

## 2023-12-11 PROCEDURE — 90636 HEP A/HEP B VACC ADULT IM: CPT | Mod: GY

## 2023-12-11 PROCEDURE — 99214 OFFICE O/P EST MOD 30 MIN: CPT | Mod: 25

## 2023-12-11 PROCEDURE — 90471 IMMUNIZATION ADMIN: CPT

## 2023-12-11 PROCEDURE — 76981 USE PARENCHYMA: CPT

## 2023-12-20 ENCOUNTER — APPOINTMENT (OUTPATIENT)
Dept: RADIOLOGY | Facility: CLINIC | Age: 68
End: 2023-12-20

## 2024-02-05 ENCOUNTER — APPOINTMENT (OUTPATIENT)
Dept: FAMILY MEDICINE | Facility: CLINIC | Age: 69
End: 2024-02-05
Payer: MEDICARE

## 2024-02-05 ENCOUNTER — LABORATORY RESULT (OUTPATIENT)
Age: 69
End: 2024-02-05

## 2024-02-05 VITALS
HEART RATE: 66 BPM | DIASTOLIC BLOOD PRESSURE: 74 MMHG | OXYGEN SATURATION: 99 % | HEIGHT: 69 IN | SYSTOLIC BLOOD PRESSURE: 111 MMHG | BODY MASS INDEX: 26.66 KG/M2 | TEMPERATURE: 97.7 F | WEIGHT: 180 LBS | RESPIRATION RATE: 16 BRPM

## 2024-02-05 DIAGNOSIS — C67.9 MALIGNANT NEOPLASM OF BLADDER, UNSPECIFIED: ICD-10-CM

## 2024-02-05 DIAGNOSIS — F32.89 OTHER SPECIFIED DEPRESSIVE EPISODES: ICD-10-CM

## 2024-02-05 DIAGNOSIS — E66.3 OVERWEIGHT: ICD-10-CM

## 2024-02-05 PROCEDURE — 99214 OFFICE O/P EST MOD 30 MIN: CPT

## 2024-02-05 NOTE — HISTORY OF PRESENT ILLNESS
[FreeTextEntry8] : Here to address depression. She wants to lose weight and is struggling with weight loss - She is physically active. She is AAOX3,NAD. She has autoimmune hepatitis, managed with Azathioprine.. She lost weight with weight watchers in past . She runs hald marathons. She runs 3-5 miles per day. She plays pickleball.

## 2024-02-05 NOTE — HEALTH RISK ASSESSMENT
[No] : In the past 12 months have you used drugs other than those required for medical reasons? No [No falls in past year] : Patient reported no falls in the past year [1] : 2) Feeling down, depressed, or hopeless for several days (1) [PHQ-2 Positive] : PHQ-2 Positive [Several Days (1)] : 7.) Trouble concentrating on things, such as reading a newspaper or watching television? Several days [Not at All (0)] : 8.) Moving or speaking so slowly that other people could have noticed, or the opposite, moving or speaking faster than usual? Not at all [Mild] : severity of depression is mild [Somewhat Difficult] : How difficult have these problems made it for you to do your work, take care of things at home, or get along with people? Somewhat difficult [PHQ-9 Positive] : PHQ-9 Positive [de-identified] : walking  [de-identified] : regular [[id number]] : 2 [[id number]] : 5 [Never] : Never

## 2024-03-11 NOTE — H&P PST ADULT - NEGATIVE GASTROINTESTINAL SYMPTOMS
Body mass index is 35.29 kg/m².  Increases insulin resistance.   Discussed DM diet and exercise.   Patient does exhibit insulin resistance and is on an SGLT 2, and metformin.  Insurance will not cover Ozempic    no nausea/no vomiting/no diarrhea/no constipation

## 2024-05-06 ENCOUNTER — RESULT REVIEW (OUTPATIENT)
Age: 69
End: 2024-05-06

## 2024-05-16 ENCOUNTER — APPOINTMENT (OUTPATIENT)
Dept: BARIATRICS | Facility: CLINIC | Age: 69
End: 2024-05-16

## 2024-05-17 ENCOUNTER — APPOINTMENT (OUTPATIENT)
Dept: OBGYN | Facility: CLINIC | Age: 69
End: 2024-05-17
Payer: MEDICARE

## 2024-05-17 ENCOUNTER — NON-APPOINTMENT (OUTPATIENT)
Age: 69
End: 2024-05-17

## 2024-05-17 VITALS
BODY MASS INDEX: 26.66 KG/M2 | TEMPERATURE: 97.2 F | SYSTOLIC BLOOD PRESSURE: 120 MMHG | HEIGHT: 69 IN | OXYGEN SATURATION: 93 % | HEART RATE: 56 BPM | RESPIRATION RATE: 18 BRPM | DIASTOLIC BLOOD PRESSURE: 70 MMHG | WEIGHT: 180 LBS

## 2024-05-17 DIAGNOSIS — Z01.419 ENCOUNTER FOR GYNECOLOGICAL EXAMINATION (GENERAL) (ROUTINE) W/OUT ABNORMAL FINDINGS: ICD-10-CM

## 2024-05-17 DIAGNOSIS — R92.30 DENSE BREASTS, UNSPECIFIED: ICD-10-CM

## 2024-05-17 PROCEDURE — G0101: CPT

## 2024-05-20 ENCOUNTER — NON-APPOINTMENT (OUTPATIENT)
Age: 69
End: 2024-05-20

## 2024-05-22 DIAGNOSIS — N89.8 OTHER SPECIFIED NONINFLAMMATORY DISORDERS OF VAGINA: ICD-10-CM

## 2024-05-22 RX ORDER — ESTRADIOL 0.1 MG/G
0.1 CREAM VAGINAL
Qty: 3 | Refills: 3 | Status: ACTIVE | COMMUNITY
Start: 2024-05-22 | End: 1900-01-01

## 2024-05-24 LAB
C TRACH RRNA SPEC QL NAA+PROBE: NOT DETECTED
CANDIDA VAG CYTO: NOT DETECTED
CYTOLOGY CVX/VAG DOC THIN PREP: ABNORMAL
G VAGINALIS+PREV SP MTYP VAG QL MICRO: NOT DETECTED
HPV HIGH+LOW RISK DNA PNL CVX: NOT DETECTED
N GONORRHOEA RRNA SPEC QL NAA+PROBE: NOT DETECTED
SOURCE AMPLIFICATION: NORMAL
T VAGINALIS VAG QL WET PREP: NOT DETECTED

## 2024-06-03 ENCOUNTER — NON-APPOINTMENT (OUTPATIENT)
Age: 69
End: 2024-06-03

## 2024-06-03 ENCOUNTER — APPOINTMENT (OUTPATIENT)
Dept: ULTRASOUND IMAGING | Facility: HOSPITAL | Age: 69
End: 2024-06-03
Payer: MEDICARE

## 2024-06-03 ENCOUNTER — APPOINTMENT (OUTPATIENT)
Dept: ORTHOPEDIC SURGERY | Facility: CLINIC | Age: 69
End: 2024-06-03
Payer: MEDICARE

## 2024-06-03 ENCOUNTER — OUTPATIENT (OUTPATIENT)
Dept: OUTPATIENT SERVICES | Facility: HOSPITAL | Age: 69
LOS: 1 days | End: 2024-06-03
Payer: MEDICARE

## 2024-06-03 ENCOUNTER — APPOINTMENT (OUTPATIENT)
Dept: CT IMAGING | Facility: HOSPITAL | Age: 69
End: 2024-06-03
Payer: MEDICARE

## 2024-06-03 ENCOUNTER — RESULT REVIEW (OUTPATIENT)
Age: 69
End: 2024-06-03

## 2024-06-03 VITALS — BODY MASS INDEX: 25.92 KG/M2 | WEIGHT: 175 LBS | HEIGHT: 69 IN

## 2024-06-03 DIAGNOSIS — M47.817 SPONDYLOSIS W/OUT MYELOPATHY OR RADICULOPATHY, LUMBOSACRAL REGION: ICD-10-CM

## 2024-06-03 DIAGNOSIS — C67.9 MALIGNANT NEOPLASM OF BLADDER, UNSPECIFIED: ICD-10-CM

## 2024-06-03 DIAGNOSIS — Z98.890 OTHER SPECIFIED POSTPROCEDURAL STATES: Chronic | ICD-10-CM

## 2024-06-03 DIAGNOSIS — M51.36 OTHER INTERVERTEBRAL DISC DEGENERATION, LUMBAR REGION: ICD-10-CM

## 2024-06-03 PROCEDURE — 76700 US EXAM ABDOM COMPLETE: CPT | Mod: 26

## 2024-06-03 PROCEDURE — 74178 CT ABD&PLV WO CNTR FLWD CNTR: CPT

## 2024-06-03 PROCEDURE — 74178 CT ABD&PLV WO CNTR FLWD CNTR: CPT | Mod: 26,MH

## 2024-06-03 PROCEDURE — 73502 X-RAY EXAM HIP UNI 2-3 VIEWS: CPT

## 2024-06-03 PROCEDURE — 76700 US EXAM ABDOM COMPLETE: CPT

## 2024-06-03 PROCEDURE — 99203 OFFICE O/P NEW LOW 30 MIN: CPT

## 2024-06-03 NOTE — PHYSICAL EXAM
[de-identified] : Constitutional:Well nourished , well developed and in no acute distress Psychiatric: Alert and oriented to time place and person.Appropriate affect  Skin:Head, neck, arms and lower extremities:no lesions or discoloration HEENT: Normocephalic, EOM intact, Nasal septum midline, Respiratory: Unlabored respirations,no audible wheezing ,no tachypnea, no cyanosis Cardiovascular: no leg swelling  no ankle edema no JVD, pulse regular Vascular: no calf or thigh tenderness,  Peripheral pulses; intact Lymphatics:No groin adenopathy,no lymphedema lower  or upper extremities Right hip satisfactory gait leg length equal passive range of motion pain-free flexion 100 internal 20 external 40 abduction 45 adduction 40 no local tenderness resisted hip abduction 5/5 [de-identified] : X-ray AP pelvis right hip right and left hip joint space satisfactory

## 2024-06-03 NOTE — HISTORY OF PRESENT ILLNESS
[de-identified] : This is a 68-year-old female  of years ago patient developed spontaneous onset of intermittent pain lateral pelvis radiating to the lower back denies groin pain pain provoked by lying on right side in bed symptoms severity increased this past week.  During the day she is relatively symptom-free when she walks but "does get some pain".

## 2024-06-03 NOTE — DISCUSSION/SUMMARY
[de-identified] : Impression right pelvic girdle symptoms likely secondary to lumbar degenerative disc disease Plan refer to physiatry for spine evaluation and management

## 2024-06-06 ENCOUNTER — APPOINTMENT (OUTPATIENT)
Dept: ULTRASOUND IMAGING | Facility: CLINIC | Age: 69
End: 2024-06-06
Payer: MEDICARE

## 2024-06-06 ENCOUNTER — RESULT REVIEW (OUTPATIENT)
Age: 69
End: 2024-06-06

## 2024-06-06 ENCOUNTER — APPOINTMENT (OUTPATIENT)
Dept: MAMMOGRAPHY | Facility: CLINIC | Age: 69
End: 2024-06-06
Payer: MEDICARE

## 2024-06-06 ENCOUNTER — OUTPATIENT (OUTPATIENT)
Dept: OUTPATIENT SERVICES | Facility: HOSPITAL | Age: 69
LOS: 1 days | End: 2024-06-06
Payer: MEDICARE

## 2024-06-06 DIAGNOSIS — Z98.84 BARIATRIC SURGERY STATUS: Chronic | ICD-10-CM

## 2024-06-06 DIAGNOSIS — R92.30 DENSE BREASTS, UNSPECIFIED: ICD-10-CM

## 2024-06-06 PROCEDURE — 76856 US EXAM PELVIC COMPLETE: CPT | Mod: 26

## 2024-06-06 PROCEDURE — 76641 ULTRASOUND BREAST COMPLETE: CPT | Mod: 26,50,GY

## 2024-06-06 PROCEDURE — 76641 ULTRASOUND BREAST COMPLETE: CPT

## 2024-06-06 PROCEDURE — 77063 BREAST TOMOSYNTHESIS BI: CPT | Mod: 26

## 2024-06-06 PROCEDURE — 77063 BREAST TOMOSYNTHESIS BI: CPT

## 2024-06-06 PROCEDURE — 77067 SCR MAMMO BI INCL CAD: CPT

## 2024-06-06 PROCEDURE — 77067 SCR MAMMO BI INCL CAD: CPT | Mod: 26

## 2024-06-06 PROCEDURE — 76856 US EXAM PELVIC COMPLETE: CPT

## 2024-06-11 LAB
ALBUMIN SERPL ELPH-MCNC: 4.3 G/DL
ALP BLD-CCNC: 133 U/L
ALT SERPL-CCNC: 10 U/L
ANION GAP SERPL CALC-SCNC: 12 MMOL/L
AST SERPL-CCNC: 22 U/L
BASOPHILS # BLD AUTO: 0.02 K/UL
BASOPHILS NFR BLD AUTO: 0.6 %
BILIRUB SERPL-MCNC: 0.2 MG/DL
BUN SERPL-MCNC: 17 MG/DL
CALCIUM SERPL-MCNC: 9.2 MG/DL
CHLORIDE SERPL-SCNC: 101 MMOL/L
CO2 SERPL-SCNC: 24 MMOL/L
CREAT SERPL-MCNC: 0.62 MG/DL
EGFR: 96 ML/MIN/1.73M2
EOSINOPHIL # BLD AUTO: 0.06 K/UL
EOSINOPHIL NFR BLD AUTO: 1.9 %
GLUCOSE SERPL-MCNC: 90 MG/DL
HCT VFR BLD CALC: 33.2 %
HGB BLD-MCNC: 10.6 G/DL
IMM GRANULOCYTES NFR BLD AUTO: 0.3 %
INR PPP: 0.91 RATIO
LYMPHOCYTES # BLD AUTO: 0.93 K/UL
LYMPHOCYTES NFR BLD AUTO: 30.2 %
MAN DIFF?: NORMAL
MCHC RBC-ENTMCNC: 28.6 PG
MCHC RBC-ENTMCNC: 31.9 GM/DL
MCV RBC AUTO: 89.5 FL
MONOCYTES # BLD AUTO: 0.43 K/UL
MONOCYTES NFR BLD AUTO: 14 %
NEUTROPHILS # BLD AUTO: 1.63 K/UL
NEUTROPHILS NFR BLD AUTO: 53 %
PLATELET # BLD AUTO: 192 K/UL
POTASSIUM SERPL-SCNC: 4.4 MMOL/L
PROT SERPL-MCNC: 6.7 G/DL
PT BLD: 10.3 SEC
RBC # BLD: 3.71 M/UL
RBC # FLD: 15.4 %
SODIUM SERPL-SCNC: 137 MMOL/L
WBC # FLD AUTO: 3.08 K/UL

## 2024-06-12 NOTE — PHYSICAL EXAM
[Chaperone Present] : A chaperone was present in the examining room during all aspects of the physical examination [42077] : A chaperone was present during the pelvic exam. [Appropriately responsive] : appropriately responsive [Alert] : alert [No Acute Distress] : no acute distress [No Lymphadenopathy] : no lymphadenopathy [Soft] : soft [Non-tender] : non-tender [Non-distended] : non-distended [No HSM] : No HSM [No Lesions] : no lesions [No Mass] : no mass [Oriented x3] : oriented x3 [Examination Of The Breasts] : a normal appearance [No Masses] : no breast masses were palpable [Labia Majora] : normal [Labia Minora] : normal [Normal] : normal [Uterine Adnexae] : normal

## 2024-06-12 NOTE — HISTORY OF PRESENT ILLNESS
[FreeTextEntry1] : 68  presents today for well woman exam.  POB: [id number]  PGYN: , denies abl pap PMH: autoimmune hepatitis, hep B, bladder ca with chemo, skin ca PSH: gastric bypass, bladder tumor resection  Med: per MAR All: nkma SH: former smoker FH: mother with cervical ca  Mammo: 2022 Colonoscopy: 2 years ago

## 2024-06-13 ENCOUNTER — APPOINTMENT (OUTPATIENT)
Dept: HEPATOLOGY | Facility: CLINIC | Age: 69
End: 2024-06-13
Payer: MEDICARE

## 2024-06-13 VITALS
DIASTOLIC BLOOD PRESSURE: 73 MMHG | OXYGEN SATURATION: 96 % | HEART RATE: 64 BPM | HEIGHT: 69 IN | BODY MASS INDEX: 25.92 KG/M2 | TEMPERATURE: 98.3 F | SYSTOLIC BLOOD PRESSURE: 107 MMHG | WEIGHT: 175 LBS | RESPIRATION RATE: 18 BRPM

## 2024-06-13 DIAGNOSIS — K75.4 AUTOIMMUNE HEPATITIS: ICD-10-CM

## 2024-06-13 DIAGNOSIS — K74.69 OTHER CIRRHOSIS OF LIVER: ICD-10-CM

## 2024-06-13 PROCEDURE — 99214 OFFICE O/P EST MOD 30 MIN: CPT

## 2024-06-13 RX ORDER — MECLIZINE HYDROCHLORIDE 25 MG/1
25 TABLET ORAL 3 TIMES DAILY
Refills: 0 | Status: DISCONTINUED | COMMUNITY
End: 2024-06-13

## 2024-06-13 RX ORDER — METOCLOPRAMIDE 5 MG/1
5 TABLET ORAL 3 TIMES DAILY
Refills: 0 | Status: DISCONTINUED | COMMUNITY
End: 2024-06-13

## 2024-06-13 NOTE — HISTORY OF PRESENT ILLNESS
[de-identified] : Ms. Torres is a very pleasant 67 yo F with a history of obesity (s/p Lopez-en-Y gastric bypass), osteoporosis, history of a low grade papillary urothelial cancer (found incidentally on surveillance MRI abdomen/pelvis on 1/8/22, s/p TURBT 2/21/22, with subsequent multifocal recurrent low grade tumors s/p TURBT on 9/5/23 and subsequent bladder infusions of gemcitabine on 9/5/23 and weekly x6 weeks starting on 9/27/23, s/p repeat cystoscopy on 12/8/23), 2 cm meningioma (undergoing annual MRI surveillance), and autoimmune hepatitis (diagnosed in 2017 after she presented with a severe acute hepatocellular injury with associated jaundice) with associated compensated cirrhosis. She was previously treated with prednisone and azathioprine and achieved biochemical remission, with subsequent drug withdrawal in 2019. She had an AIH relapse in 2020 that was unable to be controlled with budesonide alone and necessitated resumed prednisone and azathioprine, now in biochemical remission again. She was tapered off prednisone again, previously 2.5 mg po daily, then 2.5 mg po every other day (5/11/23-8/15/23), and then discontinued since mid-8/2023. She remains on azathioprine 100 mg po daily (previously increased from 50 mg po daily in 8/2022 to enable the prednisone withdrawal). She underwent MR elastography on 9/11/20 that showed cirrhosis including based on hepatic stiffness but without any focal hepatic lesion or radiologic findings to suggest portal hypertension. She underwent MRI abdomen/pelvis on 1/8/22 that led to incidental diagnosis of her papillary urothelial cancer but with otherwise stable liver findings.  FibroScan (12/11/23): Median liver stiffness 7.1 kPa (normal, consistent with F0-1 fibrosis) and CAP score 146 dB/m (normal, consistent with S0 steatosis).  She was last seen by me on 12/11/23 and is here today for routine follow-up. She remains on azathioprine 100 mg po daily (unchanged since her last visit). Shortly after our last visit, she underwent elective blepharoplasty under general anesthesia on 12/15/23. She reports feeling well currently. No history of confusion or mental fogginess, overt GI bleeding, abdominal distension with ascites, lower extremity swelling, or dyspnea. She is also seeing a therapist now which has been very helpful for improving her depression and self-esteem.   She is a former smoker. She does not drink any alcohol. No recreational drug use. 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 She has an adult son and a young granddaughter who live here in [place] and is expecting a new granddaughter in 10/2024. She also frequents [place].  She has no known family history of liver disease. Her mother had uterine cancer. Her sister has DM.

## 2024-06-13 NOTE — PHYSICAL EXAM
[Non-Tender] : non-tender [Smooth] : smooth [General Appearance - Alert] : alert [General Appearance - In No Acute Distress] : in no acute distress [General Appearance - Well Nourished] : well nourished [General Appearance - Well Developed] : well developed [General Appearance - Well-Appearing] : healthy appearing [Sclera] : the sclera and conjunctiva were normal [Hearing Threshold Finger Rub Not Terrell] : hearing was normal [Oropharynx] : the oropharynx was normal [Neck Appearance] : the appearance of the neck was normal [Neck Cervical Mass (___cm)] : no neck mass was observed [Jugular Venous Distention Increased] : there was no jugular-venous distention [Respiration, Rhythm And Depth] : normal respiratory rhythm and effort [Exaggerated Use Of Accessory Muscles For Inspiration] : no accessory muscle use [Auscultation Breath Sounds / Voice Sounds] : lungs were clear to auscultation bilaterally [Heart Rate And Rhythm] : heart rate was normal and rhythm regular [Heart Sounds] : normal S1 and S2 [Murmurs] : no murmurs [Edema] : there was no peripheral edema [Bowel Sounds] : normal bowel sounds [Abdomen Soft] : soft [Abdomen Tenderness] : non-tender [Abdomen Mass (___ Cm)] : no abdominal mass palpated [Abnormal Walk] : normal gait [Nail Clubbing] : no clubbing  or cyanosis of the fingernails [Involuntary Movements] : no involuntary movements were seen [Skin Color & Pigmentation] : normal skin color and pigmentation [Skin Turgor] : normal skin turgor [] : no rash [Oriented To Time, Place, And Person] : oriented to person, place, and time [Impaired Insight] : insight and judgment were intact [Affect] : the affect was normal [Memory Recent] : recent memory was not impaired [Memory Remote] : remote memory was not impaired [Scleral Icterus] : No Scleral Icterus [Spider Angioma] : No spider angioma(s) were observed [Abdominal  Ascites] : no ascites [Splenomegaly] : no splenomegaly [Caput Medusae] : no caput medusae observed [Asterixis] : no asterixis observed [Jaundice] : No jaundice [Palmar Erythema] : no Palmar Erythema [FreeTextEntry1] : +depressed mood, appropriate affect

## 2024-06-13 NOTE — ASSESSMENT
[FreeTextEntry1] : # Autoimmune hepatitis with associated compensated cirrhosis: - Diagnosed in 2017 with F3-4 fibrosis on biopsy at that time, with more recent MR elastography (9/11/20) with liver stiffness in the range of cirrhosis as well as cirrhotic liver morphology seen on that MRI as well as more recent MRI (1/8/22). - No history of overt hepatic decompensations apart from the initial jaundice when first diagnosed and that resolved (i.e., no history of variceal hemorrhage, ascites, or hepatic encephalopathy). - No upper GI varices on last EGD with GI Dr. Daley in 12/2018. Given platelet count >150k on last labs (6/10/24) as well as last FibroScan with median liver stiffness <20 kPa (12/11/23), she is at low risk of clinically significant portal hypertension and variceal hemorrhage and can defer EGD screening for now. We will repeat FibroScan again with her next visit and annually and she will be referred back to Dr. Daley for EGD if she no longer meets the Baveno criteria to defer endoscopic screening. - Most recent labs (6/10/24) with normal liver chemistries including after she was tapered off prednisone since mid-8/2023. Her calculated MELD 3.0 score is low. ABO O. 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 - Continue azathioprine 100 mg po daily. - Repeat labs in 6 months (12/2024), ordered today. - We have discussed the importance of lifelong screening for HCC every 6 months. No suspicious hepatic lesion seen on last MRI (1/8/22) or more recent ultrasounds (last on 6/3/24). She also had a recent single-phase CT abdomen/pelvis with IV contrast (6/3/24) done as part of her urologic surveillance and with no concerning hepatic findings. Repeat ultrasound ordered today for 12/2024. We will also continue to trend AFP with her labs every 6 months. - We discussed that given her well-compensated disease with low MELD 3.0 score and no overt complications of portal hypertension, she does not have any indication for liver transplantation evaluation at this time. We discussed the potential risk of progressing to decompensated cirrhosis (approximately 5%/year risk if she remains abstinent from alcohol) and potential risks of development of ascites, SBP, varices / variceal hemorrhage, hepatic encephalopathy, PVT, and HCC and alert signs for her to seek medical attention.  # Health maintenance: - HAV and HBV non-immune, now s/p Twinrix series completed with third dose on 12/11/23. We will re-check HAV IgG and HBsAb with next labs to confirm immunity. - DEXA (8/2021) with osteoporosis. Repeat was due 8/2023 but not yet done, discussed and re-ordered at her last visit. She continues to takes daily vitamin D and eats foods high in calcium and maintains an active lifestyle, but likely needs bisphosphonate therapy if still with osteoporosis. - She last underwent colonoscopy on 10/29/18 with no polyps and plan for repeat in 10 years (2028). - Ms. PARIKH was previously counseled to: abstain from alcohol and all illicit drugs; avoid use of herbal and dietary supplements due to potential hepatotoxicity; limit use of acetaminophen to <2 grams per day; avoid use of nonsteroidal antiinflammatory drugs (NSAIDs) as these can lead to diuretic resistance and precipitate renal dysfunction in patients with advanced liver disease; avoid eating any unpasteurized dairy products; and avoid eating raw/steamed oysters or other shellfish to avoid risk of Vibrio infection.  Next follow up: 6 months

## 2024-06-17 ENCOUNTER — APPOINTMENT (OUTPATIENT)
Dept: MRI IMAGING | Facility: HOSPITAL | Age: 69
End: 2024-06-17
Payer: MEDICARE

## 2024-06-17 ENCOUNTER — OUTPATIENT (OUTPATIENT)
Dept: OUTPATIENT SERVICES | Facility: HOSPITAL | Age: 69
LOS: 1 days | End: 2024-06-17
Payer: MEDICARE

## 2024-06-17 DIAGNOSIS — Z98.890 OTHER SPECIFIED POSTPROCEDURAL STATES: Chronic | ICD-10-CM

## 2024-06-17 DIAGNOSIS — Z98.84 BARIATRIC SURGERY STATUS: Chronic | ICD-10-CM

## 2024-06-17 DIAGNOSIS — Z00.8 ENCOUNTER FOR OTHER GENERAL EXAMINATION: ICD-10-CM

## 2024-06-17 PROCEDURE — 72148 MRI LUMBAR SPINE W/O DYE: CPT | Mod: MH

## 2024-06-17 PROCEDURE — 72148 MRI LUMBAR SPINE W/O DYE: CPT | Mod: 26,MH

## 2024-07-11 ENCOUNTER — OUTPATIENT (OUTPATIENT)
Dept: OUTPATIENT SERVICES | Facility: HOSPITAL | Age: 69
LOS: 1 days | End: 2024-07-11
Payer: MEDICARE

## 2024-07-11 ENCOUNTER — APPOINTMENT (OUTPATIENT)
Dept: UROLOGY | Facility: CLINIC | Age: 69
End: 2024-07-11
Payer: MEDICARE

## 2024-07-11 VITALS — DIASTOLIC BLOOD PRESSURE: 74 MMHG | SYSTOLIC BLOOD PRESSURE: 112 MMHG | HEART RATE: 68 BPM

## 2024-07-11 DIAGNOSIS — Z98.890 OTHER SPECIFIED POSTPROCEDURAL STATES: Chronic | ICD-10-CM

## 2024-07-11 DIAGNOSIS — R35.0 FREQUENCY OF MICTURITION: ICD-10-CM

## 2024-07-11 DIAGNOSIS — Z98.84 BARIATRIC SURGERY STATUS: Chronic | ICD-10-CM

## 2024-07-11 DIAGNOSIS — C67.9 MALIGNANT NEOPLASM OF BLADDER, UNSPECIFIED: ICD-10-CM

## 2024-07-11 PROCEDURE — 52000 CYSTOURETHROSCOPY: CPT

## 2024-07-12 DIAGNOSIS — C67.9 MALIGNANT NEOPLASM OF BLADDER, UNSPECIFIED: ICD-10-CM

## 2024-07-12 LAB — URINE CYTOLOGY: NORMAL

## 2024-09-03 ENCOUNTER — NON-APPOINTMENT (OUTPATIENT)
Age: 69
End: 2024-09-03

## 2024-09-03 ENCOUNTER — APPOINTMENT (OUTPATIENT)
Dept: FAMILY MEDICINE | Facility: CLINIC | Age: 69
End: 2024-09-03

## 2024-09-03 ENCOUNTER — LABORATORY RESULT (OUTPATIENT)
Age: 69
End: 2024-09-03

## 2024-09-03 VITALS
RESPIRATION RATE: 17 BRPM | OXYGEN SATURATION: 96 % | SYSTOLIC BLOOD PRESSURE: 115 MMHG | TEMPERATURE: 97.5 F | DIASTOLIC BLOOD PRESSURE: 70 MMHG | HEART RATE: 67 BPM | BODY MASS INDEX: 23.4 KG/M2 | WEIGHT: 158 LBS | HEIGHT: 69 IN

## 2024-09-03 DIAGNOSIS — D32.9 BENIGN NEOPLASM OF MENINGES, UNSPECIFIED: ICD-10-CM

## 2024-09-03 DIAGNOSIS — K74.69 OTHER CIRRHOSIS OF LIVER: ICD-10-CM

## 2024-09-03 DIAGNOSIS — C67.9 MALIGNANT NEOPLASM OF BLADDER, UNSPECIFIED: ICD-10-CM

## 2024-09-03 DIAGNOSIS — K75.4 AUTOIMMUNE HEPATITIS: ICD-10-CM

## 2024-09-03 PROCEDURE — 36415 COLL VENOUS BLD VENIPUNCTURE: CPT

## 2024-09-03 PROCEDURE — 93000 ELECTROCARDIOGRAM COMPLETE: CPT

## 2024-09-03 PROCEDURE — 99214 OFFICE O/P EST MOD 30 MIN: CPT

## 2024-09-03 RX ORDER — TIRZEPATIDE 7.5 MG/.5ML
7.5 INJECTION, SOLUTION SUBCUTANEOUS
Qty: 1 | Refills: 0 | Status: ACTIVE | COMMUNITY
Start: 2024-09-03

## 2024-09-10 ENCOUNTER — APPOINTMENT (OUTPATIENT)
Dept: NEUROSURGERY | Facility: CLINIC | Age: 69
End: 2024-09-10

## 2024-09-13 LAB
ALBUMIN SERPL ELPH-MCNC: 4.3 G/DL
ALP BLD-CCNC: 92 U/L
ALT SERPL-CCNC: 7 U/L
ANION GAP SERPL CALC-SCNC: 15 MMOL/L
APTT BLD: 30.1 SEC
AST SERPL-CCNC: 25 U/L
BASOPHILS # BLD AUTO: 0.01 K/UL
BASOPHILS NFR BLD AUTO: 0.4 %
BILIRUB SERPL-MCNC: 0.4 MG/DL
BUN SERPL-MCNC: 14 MG/DL
CALCIUM SERPL-MCNC: 9.1 MG/DL
CHLORIDE SERPL-SCNC: 103 MMOL/L
CO2 SERPL-SCNC: 22 MMOL/L
CREAT SERPL-MCNC: 0.56 MG/DL
EGFR: 99 ML/MIN/1.73M2
EOSINOPHIL # BLD AUTO: 0.05 K/UL
EOSINOPHIL NFR BLD AUTO: 1.8 %
GLUCOSE SERPL-MCNC: 76 MG/DL
HCT VFR BLD CALC: 34.9 %
HGB BLD-MCNC: 11.2 G/DL
IMM GRANULOCYTES NFR BLD AUTO: 0.4 %
INR PPP: 0.96 RATIO
LYMPHOCYTES # BLD AUTO: 0.76 K/UL
LYMPHOCYTES NFR BLD AUTO: 27.4 %
MAGNESIUM SERPL-MCNC: 1.9 MG/DL
MAN DIFF?: NORMAL
MCHC RBC-ENTMCNC: 28.9 PG
MCHC RBC-ENTMCNC: 32.1 GM/DL
MCV RBC AUTO: 89.9 FL
MONOCYTES # BLD AUTO: 0.27 K/UL
MONOCYTES NFR BLD AUTO: 9.7 %
NEUTROPHILS # BLD AUTO: 1.67 K/UL
NEUTROPHILS NFR BLD AUTO: 60.3 %
PLATELET # BLD AUTO: 174 K/UL
POTASSIUM SERPL-SCNC: 4.2 MMOL/L
PROT SERPL-MCNC: 6.6 G/DL
PT BLD: 10.9 SEC
RBC # BLD: 3.88 M/UL
RBC # FLD: 16.9 %
SODIUM SERPL-SCNC: 140 MMOL/L
TSH SERPL-ACNC: 1.7 UIU/ML
WBC # FLD AUTO: 2.77 K/UL

## 2024-09-13 NOTE — RESULTS/DATA
[] : results reviewed [de-identified] : Acceptable-variance in WBC likely due to treatment for autoimmune hepatitis- [de-identified] : Acceptable [de-identified] : Acceptable-EKG indicative of normal sinus rhythm-

## 2024-09-13 NOTE — PLAN
From: Gilma Travis  To: Suzan Cunningham  Sent: 6/24/2021 8:15 AM CDT  Subject: Non-Urgent Medical Question    This message is being sent by Concepcion Norman on behalf of Gilma Travis.    I received my covid 19 vaccine on 5/13/21 at [facility]. If you could please add this to my medical records. Thank you   
Updated per patient request.   
[FreeTextEntry1] : Patient was instructed by surgical team to stop Zepbound 2 weeks prior No acute medical contraindications, thus it is acceptable risk for patient to have surgery performed  Decrease in WBC likely related to treatment for autoimmune hepatitis.  Likely variant and is variable for patient.  Do not see as a contraindication to move forward with surgery.

## 2024-09-13 NOTE — RESULTS/DATA
[] : results reviewed [de-identified] : Acceptable-variance in WBC likely due to treatment for autoimmune hepatitis- [de-identified] : Acceptable [de-identified] : Acceptable-EKG indicative of normal sinus rhythm-

## 2024-09-13 NOTE — ASSESSMENT
[High Risk Surgery - Intraperitoneal, Intrathoracic or Supringuinal Vascular Procedures] : High Risk Surgery - Intraperitoneal, Intrathoracic or Supringuinal Vascular Procedures - No (0) [Ischemic Heart Disease] : Ischemic Heart Disease - No (0) [Congestive Heart Failure] : Congestive Heart Failure - No (0) [Prior Cerebrovascular Accident or TIA] : Prior Cerebrovascular Accident or TIA - No (0) [Creatinine >= 2mg/dL (1 Point)] : Creatinine >= 2mg/dL - No (0) [Insulin-dependent Diabetic (1 Point)] : Insulin-dependent Diabetic - No (0) [0] : 0 , RCRI Class: I, Risk of Post-Op Cardiac Complications: 3.9%, 95% CI for Risk Estimate: 2.8% - 5.4% [Patient Optimized for Surgery] : Patient optimized for surgery [Continue medications as is] : Continue current medications

## 2024-09-13 NOTE — HISTORY OF PRESENT ILLNESS
[No Pertinent Cardiac History] : no history of aortic stenosis, atrial fibrillation, coronary artery disease, recent myocardial infarction, or implantable device/pacemaker [No Pertinent Pulmonary History] : no history of asthma, COPD, sleep apnea, or smoking [No Adverse Anesthesia Reaction] : no adverse anesthesia reaction in self or family member [(Patient denies any chest pain, claudication, dyspnea on exertion, orthopnea, palpitations or syncope)] : Patient denies any chest pain, claudication, dyspnea on exertion, orthopnea, palpitations or syncope [Chronic Anticoagulation] : no chronic anticoagulation [Chronic Kidney Disease] : no chronic kidney disease [Diabetes] : no diabetes [FreeTextEntry1] : Facelift, necklift, upper lids blepharoplast, fat transfer to face  [FreeTextEntry2] : 09/18/24 [FreeTextEntry3] : Dr wiley  [FreeTextEntry4] : 68-year-old female with past medical history of autoimmune hepatitis, and bladder CA presents for plastic surgery clearance. Overall feels well with no acute concerns Denies any pertinent cardiac or pulmonary history No adverse reactions with anesthesia

## 2024-09-13 NOTE — RESULTS/DATA
[] : results reviewed [de-identified] : Acceptable-variance in WBC likely due to treatment for autoimmune hepatitis- [de-identified] : Acceptable [de-identified] : Acceptable-EKG indicative of normal sinus rhythm-

## 2024-10-31 NOTE — H&P PST ADULT - TEMPERATURE IN CELSIUS (DEGREES C)
Date/time of call received from lab: 10/31/24 at 11:54 AM.    Lab test:  Bordetella Pertussis    Lab value:  Positive/    Ordering provider name: Adriana King    Ordering provider department: [facility]    Primary Care Provider: Adriana Montanez     Result managed by: Clinic Hours: Primary Care clinic RN staff. Was test ordered in Primary Care?: Yes, ordered in Primary Care Primary Care: route telephone encounter high priority to ordering provider and ordering provider's clinic Nurse pool.    German BONILLA RN  [facility]       36.4

## 2024-11-29 ENCOUNTER — LABORATORY RESULT (OUTPATIENT)
Age: 69
End: 2024-11-29

## 2024-12-09 ENCOUNTER — APPOINTMENT (OUTPATIENT)
Dept: ULTRASOUND IMAGING | Facility: HOSPITAL | Age: 69
End: 2024-12-09
Payer: MEDICARE

## 2024-12-09 ENCOUNTER — OUTPATIENT (OUTPATIENT)
Dept: OUTPATIENT SERVICES | Facility: HOSPITAL | Age: 69
LOS: 1 days | End: 2024-12-09
Payer: MEDICARE

## 2024-12-09 ENCOUNTER — APPOINTMENT (OUTPATIENT)
Dept: HEPATOLOGY | Facility: CLINIC | Age: 69
End: 2024-12-09

## 2024-12-09 DIAGNOSIS — K75.4 AUTOIMMUNE HEPATITIS: ICD-10-CM

## 2024-12-09 DIAGNOSIS — Z98.890 OTHER SPECIFIED POSTPROCEDURAL STATES: Chronic | ICD-10-CM

## 2024-12-09 DIAGNOSIS — K74.69 OTHER CIRRHOSIS OF LIVER: ICD-10-CM

## 2024-12-09 DIAGNOSIS — Z98.84 BARIATRIC SURGERY STATUS: Chronic | ICD-10-CM

## 2024-12-09 PROCEDURE — 76700 US EXAM ABDOM COMPLETE: CPT

## 2024-12-09 PROCEDURE — 76700 US EXAM ABDOM COMPLETE: CPT | Mod: 26

## 2024-12-17 ENCOUNTER — OUTPATIENT (OUTPATIENT)
Dept: OUTPATIENT SERVICES | Facility: HOSPITAL | Age: 69
LOS: 1 days | End: 2024-12-17
Payer: MEDICARE

## 2024-12-17 ENCOUNTER — APPOINTMENT (OUTPATIENT)
Dept: UROLOGY | Facility: CLINIC | Age: 69
End: 2024-12-17
Payer: MEDICARE

## 2024-12-17 VITALS — HEART RATE: 71 BPM | OXYGEN SATURATION: 98 % | SYSTOLIC BLOOD PRESSURE: 117 MMHG | DIASTOLIC BLOOD PRESSURE: 77 MMHG

## 2024-12-17 DIAGNOSIS — Z98.84 BARIATRIC SURGERY STATUS: Chronic | ICD-10-CM

## 2024-12-17 DIAGNOSIS — R35.0 FREQUENCY OF MICTURITION: ICD-10-CM

## 2024-12-17 DIAGNOSIS — Z98.890 OTHER SPECIFIED POSTPROCEDURAL STATES: Chronic | ICD-10-CM

## 2024-12-17 DIAGNOSIS — C67.9 MALIGNANT NEOPLASM OF BLADDER, UNSPECIFIED: ICD-10-CM

## 2024-12-17 PROCEDURE — 52000 CYSTOURETHROSCOPY: CPT

## 2024-12-18 ENCOUNTER — APPOINTMENT (OUTPATIENT)
Dept: MRI IMAGING | Facility: HOSPITAL | Age: 69
End: 2024-12-18
Payer: MEDICARE

## 2024-12-18 ENCOUNTER — OUTPATIENT (OUTPATIENT)
Dept: OUTPATIENT SERVICES | Facility: HOSPITAL | Age: 69
LOS: 1 days | End: 2024-12-18
Payer: MEDICARE

## 2024-12-18 DIAGNOSIS — Z98.84 BARIATRIC SURGERY STATUS: Chronic | ICD-10-CM

## 2024-12-18 DIAGNOSIS — Z98.890 OTHER SPECIFIED POSTPROCEDURAL STATES: Chronic | ICD-10-CM

## 2024-12-18 DIAGNOSIS — D32.9 BENIGN NEOPLASM OF MENINGES, UNSPECIFIED: ICD-10-CM

## 2024-12-18 DIAGNOSIS — C67.9 MALIGNANT NEOPLASM OF BLADDER, UNSPECIFIED: ICD-10-CM

## 2024-12-18 PROCEDURE — A9579: CPT

## 2024-12-18 PROCEDURE — 70553 MRI BRAIN STEM W/O & W/DYE: CPT | Mod: 26,MH

## 2024-12-18 PROCEDURE — 70553 MRI BRAIN STEM W/O & W/DYE: CPT

## 2024-12-19 LAB — URINE CYTOLOGY: NORMAL

## 2024-12-23 ENCOUNTER — APPOINTMENT (OUTPATIENT)
Dept: HEPATOLOGY | Facility: CLINIC | Age: 69
End: 2024-12-23
Payer: MEDICARE

## 2024-12-23 VITALS
OXYGEN SATURATION: 98 % | HEART RATE: 82 BPM | DIASTOLIC BLOOD PRESSURE: 74 MMHG | TEMPERATURE: 96.3 F | SYSTOLIC BLOOD PRESSURE: 118 MMHG | RESPIRATION RATE: 16 BRPM | WEIGHT: 153 LBS | HEIGHT: 69 IN | BODY MASS INDEX: 22.66 KG/M2

## 2024-12-23 DIAGNOSIS — E66.3 OVERWEIGHT: ICD-10-CM

## 2024-12-23 DIAGNOSIS — Z86.2 PERSONAL HISTORY OF DISEASES OF THE BLOOD AND BLOOD-FORMING ORGANS AND CERTAIN DISORDERS INVOLVING THE IMMUNE MECHANISM: ICD-10-CM

## 2024-12-23 DIAGNOSIS — K74.69 OTHER CIRRHOSIS OF LIVER: ICD-10-CM

## 2024-12-23 PROCEDURE — 76981 USE PARENCHYMA: CPT

## 2024-12-23 PROCEDURE — 99214 OFFICE O/P EST MOD 30 MIN: CPT

## 2024-12-27 ENCOUNTER — APPOINTMENT (OUTPATIENT)
Dept: FAMILY MEDICINE | Facility: CLINIC | Age: 69
End: 2024-12-27
Payer: MEDICARE

## 2024-12-27 VITALS
HEART RATE: 77 BPM | RESPIRATION RATE: 16 BRPM | OXYGEN SATURATION: 97 % | HEIGHT: 69 IN | WEIGHT: 154 LBS | DIASTOLIC BLOOD PRESSURE: 68 MMHG | TEMPERATURE: 97.9 F | BODY MASS INDEX: 22.81 KG/M2 | SYSTOLIC BLOOD PRESSURE: 110 MMHG

## 2024-12-27 DIAGNOSIS — R53.83 OTHER FATIGUE: ICD-10-CM

## 2024-12-27 DIAGNOSIS — J20.9 ACUTE BRONCHITIS, UNSPECIFIED: ICD-10-CM

## 2024-12-27 DIAGNOSIS — K75.4 AUTOIMMUNE HEPATITIS: ICD-10-CM

## 2024-12-27 DIAGNOSIS — R09.81 NASAL CONGESTION: ICD-10-CM

## 2024-12-27 PROCEDURE — 99214 OFFICE O/P EST MOD 30 MIN: CPT

## 2024-12-27 RX ORDER — AMOXICILLIN AND CLAVULANATE POTASSIUM 875; 125 MG/1; MG/1
875-125 TABLET, COATED ORAL
Qty: 14 | Refills: 0 | Status: ACTIVE | COMMUNITY
Start: 2024-12-27 | End: 1900-01-01

## 2025-01-02 ENCOUNTER — NON-APPOINTMENT (OUTPATIENT)
Age: 70
End: 2025-01-02

## 2025-01-02 ENCOUNTER — APPOINTMENT (OUTPATIENT)
Dept: NEUROSURGERY | Facility: CLINIC | Age: 70
End: 2025-01-02
Payer: MEDICARE

## 2025-01-02 PROCEDURE — 99448 NTRPROF PH1/NTRNET/EHR 21-30: CPT

## 2025-01-02 PROCEDURE — ZZZZZ: CPT

## 2025-05-02 ENCOUNTER — LABORATORY RESULT (OUTPATIENT)
Age: 70
End: 2025-05-02

## 2025-05-02 ENCOUNTER — APPOINTMENT (OUTPATIENT)
Dept: ULTRASOUND IMAGING | Facility: HOSPITAL | Age: 70
End: 2025-05-02
Payer: MEDICARE

## 2025-05-02 ENCOUNTER — OUTPATIENT (OUTPATIENT)
Dept: OUTPATIENT SERVICES | Facility: HOSPITAL | Age: 70
LOS: 1 days | End: 2025-05-02
Payer: MEDICARE

## 2025-05-02 DIAGNOSIS — Z98.890 OTHER SPECIFIED POSTPROCEDURAL STATES: Chronic | ICD-10-CM

## 2025-05-02 DIAGNOSIS — K75.4 AUTOIMMUNE HEPATITIS: ICD-10-CM

## 2025-05-02 DIAGNOSIS — K74.69 OTHER CIRRHOSIS OF LIVER: ICD-10-CM

## 2025-05-02 DIAGNOSIS — Z98.84 BARIATRIC SURGERY STATUS: Chronic | ICD-10-CM

## 2025-05-02 PROCEDURE — 76700 US EXAM ABDOM COMPLETE: CPT

## 2025-05-02 PROCEDURE — 76700 US EXAM ABDOM COMPLETE: CPT | Mod: 26

## 2025-05-06 ENCOUNTER — NON-APPOINTMENT (OUTPATIENT)
Age: 70
End: 2025-05-06

## 2025-06-16 ENCOUNTER — APPOINTMENT (OUTPATIENT)
Dept: HEPATOLOGY | Facility: CLINIC | Age: 70
End: 2025-06-16
Payer: MEDICARE

## 2025-06-16 VITALS
SYSTOLIC BLOOD PRESSURE: 118 MMHG | BODY MASS INDEX: 20.14 KG/M2 | HEIGHT: 69 IN | TEMPERATURE: 97.6 F | OXYGEN SATURATION: 100 % | HEART RATE: 62 BPM | DIASTOLIC BLOOD PRESSURE: 75 MMHG | WEIGHT: 136 LBS

## 2025-06-16 PROBLEM — D64.9 ANEMIA, UNSPECIFIED TYPE: Status: ACTIVE | Noted: 2025-06-16

## 2025-06-16 PROCEDURE — 99214 OFFICE O/P EST MOD 30 MIN: CPT

## 2025-06-16 RX ORDER — MULTIVITAMIN
TABLET ORAL
Refills: 0 | Status: ACTIVE | COMMUNITY

## 2025-06-16 RX ORDER — PNV NO.95/FERROUS FUM/FOLIC AC 28MG-0.8MG
TABLET ORAL
Refills: 0 | Status: ACTIVE | COMMUNITY

## 2025-06-17 ENCOUNTER — APPOINTMENT (OUTPATIENT)
Dept: UROLOGY | Facility: CLINIC | Age: 70
End: 2025-06-17
Payer: MEDICARE

## 2025-06-17 ENCOUNTER — OUTPATIENT (OUTPATIENT)
Dept: OUTPATIENT SERVICES | Facility: HOSPITAL | Age: 70
LOS: 1 days | End: 2025-06-17
Payer: MEDICARE

## 2025-06-17 VITALS
DIASTOLIC BLOOD PRESSURE: 70 MMHG | SYSTOLIC BLOOD PRESSURE: 104 MMHG | RESPIRATION RATE: 16 BRPM | OXYGEN SATURATION: 100 % | HEART RATE: 66 BPM

## 2025-06-17 DIAGNOSIS — Z98.84 BARIATRIC SURGERY STATUS: Chronic | ICD-10-CM

## 2025-06-17 DIAGNOSIS — Z98.890 OTHER SPECIFIED POSTPROCEDURAL STATES: Chronic | ICD-10-CM

## 2025-06-17 DIAGNOSIS — R35.0 FREQUENCY OF MICTURITION: ICD-10-CM

## 2025-06-17 LAB
BASOPHILS # BLD AUTO: 0.03 K/UL
BASOPHILS NFR BLD AUTO: 0.8 %
EOSINOPHIL # BLD AUTO: 0.12 K/UL
EOSINOPHIL NFR BLD AUTO: 3.3 %
FERRITIN SERPL-MCNC: 9 NG/ML
FOLATE SERPL-MCNC: >20 NG/ML
HCT VFR BLD CALC: 36.1 %
HGB BLD-MCNC: 11.5 G/DL
IMM GRANULOCYTES NFR BLD AUTO: 0 %
IRON SATN MFR SERPL: 6 %
IRON SERPL-MCNC: 27 UG/DL
LYMPHOCYTES # BLD AUTO: 1.16 K/UL
LYMPHOCYTES NFR BLD AUTO: 32 %
MAN DIFF?: NORMAL
MCHC RBC-ENTMCNC: 30.5 PG
MCHC RBC-ENTMCNC: 31.9 G/DL
MCV RBC AUTO: 95.8 FL
MONOCYTES # BLD AUTO: 0.42 K/UL
MONOCYTES NFR BLD AUTO: 11.6 %
NEUTROPHILS # BLD AUTO: 1.9 K/UL
NEUTROPHILS NFR BLD AUTO: 52.3 %
PLATELET # BLD AUTO: 184 K/UL
RBC # BLD: 3.77 M/UL
RBC # FLD: 14.9 %
TIBC SERPL-MCNC: 432 UG/DL
UIBC SERPL-MCNC: 405 UG/DL
VIT B12 SERPL-MCNC: 1241 PG/ML
WBC # FLD AUTO: 3.63 K/UL

## 2025-06-17 PROCEDURE — 52000 CYSTOURETHROSCOPY: CPT

## 2025-06-18 DIAGNOSIS — C67.9 MALIGNANT NEOPLASM OF BLADDER, UNSPECIFIED: ICD-10-CM

## 2025-06-19 LAB — URINE CYTOLOGY: NORMAL

## 2025-09-17 ENCOUNTER — APPOINTMENT (OUTPATIENT)
Dept: ORTHOPEDIC SURGERY | Facility: CLINIC | Age: 70
End: 2025-09-17

## (undated) DEVICE — VENODYNE/SCD SLEEVE CALF MEDIUM

## (undated) DEVICE — DRAPE LEGGINGS

## (undated) DEVICE — POSITIONER PATIENT SAFETY STRAP 3X60"

## (undated) DEVICE — TUBING TUR 2 PRONG

## (undated) DEVICE — ELCTR GROUNDING PAD ADULT COVIDIEN

## (undated) DEVICE — POSITIONER FOAM EGG CRATE ULNAR 2PCS (PINK)

## (undated) DEVICE — POSITIONER STRAP ARMBOARD VELCRO TS-30

## (undated) DEVICE — SOL IRR POUR H2O 1500ML

## (undated) DEVICE — BAG URINE W METER 2L

## (undated) DEVICE — ELCTR CUTTING LOOP 24FR RIGHT ANGLE

## (undated) DEVICE — PACK CYSTO

## (undated) DEVICE — ELCTR CUTTING LOOP FRONT LOAD SUPER

## (undated) DEVICE — CABLE DAC ACTIVE CORD

## (undated) DEVICE — CANISTER DISPOSABLE THIN WALL 3000CC

## (undated) DEVICE — SOL IRR BAG H2O 3000ML

## (undated) DEVICE — LABELS BLANK W PEN

## (undated) DEVICE — GOWN XL W TOWEL

## (undated) DEVICE — BASIN SET DOUBLE

## (undated) DEVICE — SOL IRR POUR H2O 500ML

## (undated) DEVICE — SOL IRR BAG NS 0.9% 3000ML

## (undated) DEVICE — TUBING LEVEL ONE NORMOFLO SET

## (undated) DEVICE — GLV 7 PROTEXIS (WHITE)

## (undated) DEVICE — SYR CATH TIP 2 OZ

## (undated) DEVICE — FOLEY CATH 2-WAY 18FR 5CC LATEX HYDROGEL

## (undated) DEVICE — WARMING BLANKET UPPER ADULT

## (undated) DEVICE — ELCTR PLASMA LOOP MEDIUM 24FR 12-30 DEG